# Patient Record
Sex: FEMALE | Race: WHITE | NOT HISPANIC OR LATINO | Employment: FULL TIME | URBAN - METROPOLITAN AREA
[De-identification: names, ages, dates, MRNs, and addresses within clinical notes are randomized per-mention and may not be internally consistent; named-entity substitution may affect disease eponyms.]

---

## 2017-03-08 ENCOUNTER — ALLSCRIPTS OFFICE VISIT (OUTPATIENT)
Dept: OTHER | Facility: OTHER | Age: 57
End: 2017-03-08

## 2017-07-18 ENCOUNTER — APPOINTMENT (OUTPATIENT)
Dept: LAB | Age: 57
End: 2017-07-18
Payer: COMMERCIAL

## 2017-07-18 ENCOUNTER — TRANSCRIBE ORDERS (OUTPATIENT)
Dept: ADMINISTRATIVE | Age: 57
End: 2017-07-18

## 2017-07-18 DIAGNOSIS — E03.9 UNSPECIFIED HYPOTHYROIDISM: ICD-10-CM

## 2017-07-18 DIAGNOSIS — Z13.220 SCREENING FOR LIPOID DISORDERS: ICD-10-CM

## 2017-07-18 DIAGNOSIS — E03.9 UNSPECIFIED HYPOTHYROIDISM: Primary | ICD-10-CM

## 2017-07-18 LAB
ALBUMIN SERPL BCP-MCNC: 3.6 G/DL (ref 3.5–5)
ALP SERPL-CCNC: 41 U/L (ref 46–116)
ALT SERPL W P-5'-P-CCNC: 19 U/L (ref 12–78)
ANION GAP SERPL CALCULATED.3IONS-SCNC: 4 MMOL/L (ref 4–13)
AST SERPL W P-5'-P-CCNC: 13 U/L (ref 5–45)
BASOPHILS # BLD AUTO: 0.03 THOUSANDS/ΜL (ref 0–0.1)
BASOPHILS NFR BLD AUTO: 1 % (ref 0–1)
BILIRUB SERPL-MCNC: 0.47 MG/DL (ref 0.2–1)
BUN SERPL-MCNC: 17 MG/DL (ref 5–25)
CALCIUM SERPL-MCNC: 8.6 MG/DL (ref 8.3–10.1)
CHLORIDE SERPL-SCNC: 106 MMOL/L (ref 100–108)
CHOLEST SERPL-MCNC: 205 MG/DL (ref 50–200)
CO2 SERPL-SCNC: 28 MMOL/L (ref 21–32)
CREAT SERPL-MCNC: 0.74 MG/DL (ref 0.6–1.3)
EOSINOPHIL # BLD AUTO: 0.07 THOUSAND/ΜL (ref 0–0.61)
EOSINOPHIL NFR BLD AUTO: 2 % (ref 0–6)
ERYTHROCYTE [DISTWIDTH] IN BLOOD BY AUTOMATED COUNT: 12.8 % (ref 11.6–15.1)
GFR SERPL CREATININE-BSD FRML MDRD: >60 ML/MIN/1.73SQ M
GLUCOSE P FAST SERPL-MCNC: 97 MG/DL (ref 65–99)
HCT VFR BLD AUTO: 35.3 % (ref 34.8–46.1)
HDLC SERPL-MCNC: 62 MG/DL (ref 40–60)
HGB BLD-MCNC: 11.9 G/DL (ref 11.5–15.4)
LDLC SERPL CALC-MCNC: 120 MG/DL (ref 0–100)
LYMPHOCYTES # BLD AUTO: 2.03 THOUSANDS/ΜL (ref 0.6–4.47)
LYMPHOCYTES NFR BLD AUTO: 43 % (ref 14–44)
MCH RBC QN AUTO: 31.3 PG (ref 26.8–34.3)
MCHC RBC AUTO-ENTMCNC: 33.7 G/DL (ref 31.4–37.4)
MCV RBC AUTO: 93 FL (ref 82–98)
MONOCYTES # BLD AUTO: 0.39 THOUSAND/ΜL (ref 0.17–1.22)
MONOCYTES NFR BLD AUTO: 8 % (ref 4–12)
NEUTROPHILS # BLD AUTO: 2.18 THOUSANDS/ΜL (ref 1.85–7.62)
NEUTS SEG NFR BLD AUTO: 46 % (ref 43–75)
NRBC BLD AUTO-RTO: 0 /100 WBCS
PLATELET # BLD AUTO: 218 THOUSANDS/UL (ref 149–390)
PMV BLD AUTO: 10.2 FL (ref 8.9–12.7)
POTASSIUM SERPL-SCNC: 4 MMOL/L (ref 3.5–5.3)
PROT SERPL-MCNC: 6.6 G/DL (ref 6.4–8.2)
RBC # BLD AUTO: 3.8 MILLION/UL (ref 3.81–5.12)
SODIUM SERPL-SCNC: 138 MMOL/L (ref 136–145)
TRIGL SERPL-MCNC: 116 MG/DL
TSH SERPL DL<=0.05 MIU/L-ACNC: 2.58 UIU/ML (ref 0.36–3.74)
WBC # BLD AUTO: 4.7 THOUSAND/UL (ref 4.31–10.16)

## 2017-07-18 PROCEDURE — 84443 ASSAY THYROID STIM HORMONE: CPT

## 2017-07-18 PROCEDURE — 80061 LIPID PANEL: CPT

## 2017-07-18 PROCEDURE — 85025 COMPLETE CBC W/AUTO DIFF WBC: CPT

## 2017-07-18 PROCEDURE — 36415 COLL VENOUS BLD VENIPUNCTURE: CPT

## 2017-07-18 PROCEDURE — 80053 COMPREHEN METABOLIC PANEL: CPT

## 2017-07-19 ENCOUNTER — GENERIC CONVERSION - ENCOUNTER (OUTPATIENT)
Dept: OTHER | Facility: OTHER | Age: 57
End: 2017-07-19

## 2017-08-07 ENCOUNTER — ALLSCRIPTS OFFICE VISIT (OUTPATIENT)
Dept: OTHER | Facility: OTHER | Age: 57
End: 2017-08-07

## 2017-10-05 ENCOUNTER — ALLSCRIPTS OFFICE VISIT (OUTPATIENT)
Dept: OTHER | Facility: OTHER | Age: 57
End: 2017-10-05

## 2017-10-06 NOTE — PROGRESS NOTES
Assessment  1  Acute upper respiratory infection (465 9) (J06 9)    Plan  Acute upper respiratory infection    · Azithromycin 250 MG Oral Tablet; TAKE 2 TABLETS ON DAY 1 THEN TAKE 1  TABLET A DAY FOR 4 DAYS  Insomnia    · Zolpidem Tartrate ER 6 25 MG Oral Tablet Extended Release; TAKE 1 TABLET AT  BEDTIME    Discussion/Summary    Inc oral fluidsto raise dose of Ambiencurrent meds  The patient was counseled regarding impressions  Possible side effects of new medications were reviewed with the patient/guardian today  The treatment plan was reviewed with the patient/guardian  The patient/guardian understands and agrees with the treatment plan      Chief Complaint  Patient presents to office today with a sinus headache for about a week now  History of Present Illness  HPI: 1 week of dry cough, sinus congestion, left worse than rightfever, chillsIbuprofen and Qnasl without much relief      Review of Systems    Constitutional: no fever  ENT: as noted in HPI,-no earache-and-no sore throat  Respiratory: cough, but-as noted in HPI  Gastrointestinal: no vomiting-and-no diarrhea  Other Symptoms: insomnia, takes Ambien ER as needed which is slightly better than the IR form  Active Problems  1  Allergic rhinitis (477 9) (J30 9)   2  Hypothyroidism (244 9) (E03 9)   3  Influenza vaccine refused (V64 06) (Z28 21)   4  Insomnia (780 52) (G47 00)   5  Need for diphtheria-tetanus-pertussis (Tdap) vaccine (V06 1) (Z23)   6  Premature ovarian failure (256 8) (E28 8)   7  Screening for lipid disorders (V77 91) (Z13 220)   8  Tubular adenoma of colon (211 3) (D12 6)    Past Medical History  Active Problems And Past Medical History Reviewed: The active problems and past medical history were reviewed and updated today  Surgical History  Surgical History Reviewed: The surgical history was reviewed and updated today         Social History   · Denied: History of Drug Use   · Exercises daily (V49 89) (Z78 9)   · Never a smoker   · Regular alcohol consumption (V69 8) (Z78 9)   · One beverage daily - July 2015  · Two children  The social history was reviewed and updated today  Family History  Family History Reviewed: The family history was reviewed and updated today  Current Meds   1  Estradiol 0 5 MG Oral Tablet; Taking 1 tablet 2 times weekly  August 2016; Therapy: (Recorded:05Wfi9734) to Recorded   2  Fiber Complete Oral Tablet; Takes 2 tablets daily; Therapy: (Recorded:24Pqf4894) to Recorded   3  Norethindrone Acetate 5 MG Oral Tablet; Taking 1 tablet 2 times weekly  August 2016; Therapy: 48BOM2169 to (Evaluate:08Itf1922) Recorded   4  Qnasl 80 MCG/ACT Nasal Aerosol Solution; Use 1 to 2 sprays to bilateral nostrils daily   during allergy season  Avoid nasal septum, as instructed; Therapy: 15XLG6681 to (Last Rx:78Ngu3123)  Requested for: 24Onq5912 Ordered   5  Synthroid 137 MCG Oral Tablet; TAKE 1 TABLET BY MOUTH EVERY DAY ON EMPTY   STOMACH; Therapy: 89CIB9233 to (Evaluate:67Rlk8356)  Requested for: 23LKX8205; Last   Rx:74Okc2446 Ordered   6  Zolpidem Tartrate ER 6 25 MG Oral Tablet Extended Release; TAKE 1 TABLET AT   BEDTIME; Therapy: 89Qzk8853 to (Evaluate:75Rtu9112); Last Rx:91Sxh8176 Ordered    The medication list was reviewed and updated today  Allergies  1  No Known Drug Allergies  2  Seasonal    Vitals   Recorded: 29ZHW5507 12:54PM   Temperature 98 2 F, Oral   Heart Rate 80   Systolic 851   Diastolic 62   Height 5 ft 6 in   Weight 152 lb 2 oz   BMI Calculated 24 55   BSA Calculated 1 78   O2 Saturation 95     Physical Exam    Constitutional   General appearance: No acute distress, well appearing and well nourished  Eyes   Conjunctiva and lids: No swelling, erythema or discharge  Ears, Nose, Mouth, and Throat   Otoscopic examination: Tympanic membranes translucent with normal light reflex  Canals patent without erythema      Oropharynx: Normal with no erythema, edema, exudate or lesions  Pulmonary   Respiratory effort: No increased work of breathing or signs of respiratory distress  Auscultation of lungs: Clear to auscultation  Cardiovascular   Auscultation of heart: Normal rate and rhythm, normal S1 and S2, without murmurs  Lymphatic   Palpation of lymph nodes in neck: No lymphadenopathy  Musculoskeletal   Gait and station: Normal          Future Appointments    Date/Time Provider Specialty Site   08/09/2018 08:00 AM ELAINE Arita   [facility]     Signatures   Electronically signed by : ELAINE Fischer ; Oct  5 2017  1:09PM EST                       (Author)

## 2018-01-12 VITALS
OXYGEN SATURATION: 95 % | HEIGHT: 66 IN | DIASTOLIC BLOOD PRESSURE: 62 MMHG | SYSTOLIC BLOOD PRESSURE: 106 MMHG | BODY MASS INDEX: 24.45 KG/M2 | HEART RATE: 80 BPM | TEMPERATURE: 98.2 F | WEIGHT: 152.13 LBS

## 2018-01-12 NOTE — PROGRESS NOTES
Assessment    1  Encounter for preventive health examination (V70 0) (Z00 00)   2  Hypothyroidism (244 9) (E03 9)   3  Pain in both feet (729 5) (M79 671,M79 672)   4  Pain of finger of left hand (729 5) (M79 645)   5  Allergic rhinitis (477 9) (J30 9)   6  Never a smoker   7  Regular alcohol consumption (V69 8) (Z78 9)   · One beverage daily - July 2015    8  Exercises daily (V49 89) (Z78 9)   9  Two children   10  Family history of colon cancer (V16 0) (Z80 0) : Father   6  Family history of Alzheimer's disease (V17 2) (Z82 0) : Mother   15  Family history of hyperlipidemia (V18 19) (Z83 49) : Mother   15  History of Arthroplasty With Prosthesis Trapezium   · Left thumb with LRTI - 2008 - Dr Elvie Robles  14  History of Tonsillectomy   15  Laboratory examination ordered as part of a complete physical examination (V72 62)    (Z00 00)   16  Screening for lipid disorders (V77 91) (Z13 220)   17  Need for diphtheria-tetanus-pertussis (Tdap) vaccine (V06 1) (Z23)   18  Influenza vaccine refused (V64 06) (Z28 21)    Plan   Allergic rhinitis    · Qnasl 80 MCG/ACT Nasal Aerosol Solution; Use 1 to 2 sprays to bilateral nostrils  daily during allergy season  Avoid nasal septum, as instructed  Health Maintenance    · Always use a seat belt and shoulder strap when riding or driving a motor vehicle ;  Status:Complete;   Done: 50FQY7946   · Brush your teeth freq1 and floss at least once a day ; Status:Complete;   Done:  71Aom4174   · Use a sun block product with an SPF of 15 or more ; Status:Complete;   Done:  05QHJ3317   · We recommend routine visits to a dentist ; Status:Complete;   Done: 40LKD5890   · We recommend that you follow these steps to lower your risk of osteoporosis  ;  Status:Complete;   Done: 82FLJ0212   · Call [phone] if: You find a new or different kind of lump in your breast ;  Status:Complete;   Done: 55UJH1200   · Call [phone] if:  You have any bleeding from the vagina ; Status:Complete; Done: 36SDN8159   · Call [phone] if: You have any warning signs of skin cancer ; Status:Complete;    Done: 82NOA7989   · Call [phone] if: You experience a new kind of chest pain (angina) or pressure ;  Status:Complete;   Done: 08XHK3526  Hypothyroidism, Laboratory examination ordered as part of a complete physical  examination, Screening for lipid disorders    · (1) CBC/PLT/DIFF; Status:Active; Requested YUT:38OEU9917;    · (1) COMPREHENSIVE METABOLIC PANEL; Status:Active; Requested DPV:43AWA1499;    · (1) LIPID PANEL FASTING W DIRECT LDL REFLEX; Status:Active; Requested  MSI:89XHZ1512;    · (1) TSH WITH FT4 REFLEX; Status:Active; Requested OXO:23KGA9539; Influenza vaccine refused    · Stop: Fluzone Quadrivalent 0 5 ML Intramuscular Suspension  Need for diphtheria-tetanus-pertussis (Tdap) vaccine    · Stop: Adacel 5-2-15 5 LF-MCG/0 5 Intramuscular Suspension  Pain of finger of left hand    · 1 Rahel Szymanski MD, Chrissie Moore  (Orthopedic Surgery) Physician Referral  Consult  Status: Active   Requested for: 14Vpt9591  Care Summary provided  : Yes    2 - Elfreda Heading  (Podiatry) Physician Referral  Consult  Status: Hold For - Scheduling  Requested for: 53Ned9349  Ordered; For: Pain in both feet;  Ordered By: Terese Daniels  Performed:   Due: 25JQB3594  Follow-up visit in 1 month Evaluation and Treatment  Follow-up  Status: Hold For - Scheduling  Requested for: 33Lqp0828  Ordered; For: Health Maintenance;  Ordered By: Terese Daniels  Performed:   Due: 28IBL9715     Discussion/Summary  health maintenance visit healthy adult female Currently, she eats a healthy diet and has an adequate exercise regimen  cervical cancer screening is current Breast cancer screening: mammogram is current  Colorectal cancer screening: colorectal cancer screening is current  The patient declines immunizations  Advice and education were given regarding sunscreen use and seat belt use  Patient discussion: discussed with the patient  Chief Complaint  HWN-The patient presents for a wellness visit  BK       History of Present Illness  HM, Adult Female: The patient is being seen for a health maintenance evaluation  The last health maintenance visit was 1 year(s) ago  Social History: Household members include spouse  She is   Work status: working full time and occupation: teacher  The patient has never smoked cigarettes  She reports drinking 1 drinks per day  The patient has no concerns about alcohol abuse  She has never used illicit drugs  General Health: The patient's health since the last visit is described as good  She has regular dental visits  The patient brushes 2 time(s) a day and flosses 1 time(s) a day  Dental problems: no tooth pain and no caries  She denies vision problems  Vision care includes wearing glasses  She denies hearing loss  Immunizations status: not up to date  Lifestyle:  She consumes a diverse and healthy diet  She does not have any weight concerns  She exercises regularly  She exercises for 60 minutes per session  Exercise includes walking and swimming  She consumes alcohol  She reports drinking 2 drinks per week  Alcohol concern: The patient has no concerns about alcohol abuse  She denies drug use  Reproductive health: the patient is postmenopausal   she is sexually active  Screening: Cervical cancer screening includes a pap smear performed 2015  Breast cancer screening includes a mammogram performed last year  Colorectal cancer screening includes a colonoscopy performed June 2016  Safety elements used: seat belt, bicycle helmet, safe driving habits, sunscreen, smoke detector and carbon monoxide detector  HPI: HWN-She is a pleasant 55-year-old lady who presents for a wellness visit  When asked, she reports feeling well  She had a colonoscopy performed in June 2016 and a five-year follow-up was recommended  She reports that she was bitten by her puppy on her left pinky finger   The wound healed, but there is a painful nodule that remains  Occasionally, she reports pain on the bottom of both feet in the middle of the ball of her feet  She reports that this symptom is very sporadic and rarely occurs  Review of Systems    Cardiovascular: No complaints of slow heart rate, no fast heart rate, no chest pain, no palpitations, no leg claudication, no lower extremity edema  Respiratory: No complaints of shortness of breath, no wheezing, no cough, no SOB on exertion, no orthopnea, no PND  Gastrointestinal: No complaints of abdominal pain, no constipation, no nausea or vomiting, no diarrhea, no bloody stools  Genitourinary: No complaints of dysuria, no incontinence, no pelvic pain, no dysmenorrhea, no vaginal discharge or bleeding  ROS reviewed  Active Problems    1  Allergic rhinitis (477 9) (J30 9)   2  Hypothyroidism (244 9) (E03 9)   3  Insomnia (780 52) (G47 00)   4  Laboratory examination ordered as part of a complete physical examination (V72 62)   (Z00 00)   5  Laboratory examination ordered as part of a routine general medical examination   (V72 62) (Z00 00)   6  Premature ovarian failure (256 8) (E28 8)   7  Screening for lipid disorders (V77 91) (Z13 220)   8   Tubular adenoma of colon (211 3) (D12 6)    Past Medical History    · History of Dyslipidemia (272 4) (E78 5)    Surgical History    · History of Arthroplasty With Prosthesis Trapezium   · History of Tonsillectomy    Family History  Mother    · Family history of Alzheimer's disease (V17 2) (Z82 0)   · Family history of hyperlipidemia (V18 19) (Z83 49)   · Family history of Mother  At Age 80  Father    · Family history of colon cancer (V16 0) (Z80 0)   · Family history of Father  At Age 64  Brother    · Family history of Brother's Year Of Birth   · Family history of Brother's Year Of Birth  Family History    · Family history of Family Health Status ___ Children Living    Social History    · Denied: History of Drug Use   · Exercises daily (V49 89) (Z78 9)   · Never a smoker   · Regular alcohol consumption (V69 8) (Z78 9)   · One beverage daily - July 2015  · Two children    Current Meds   1  Calcium + D TABS; Advised to get between 1200 and 2000 mg daily, combining diet and   supplements  UTD com/pts - Osteo P&T - July 2014; Therapy: (Recorded:75Oiy9811) to Recorded   2  Estradiol 0 5 MG Oral Tablet; Taking 1 tablet 2 times weekly  August 2016; Therapy: (Recorded:54Ctg1618) to Recorded   3  Fiber Complete Oral Tablet; Takes 2 tablets daily; Therapy: (Recorded:27Lqm3977) to Recorded   4  Norethindrone Acetate 5 MG Oral Tablet; Taking 1 tablet 2 times weekly  August 2016; Therapy: 88EST3675 to (Evaluate:74Bqf1608) Recorded   5  Qnasl 80 MCG/ACT Nasal Aerosol Solution; Use 1 to 2 sprays to bilateral nostrils daily   during allergy season  Avoid nasal septum, as instructed; Therapy: 10SHA6991 to (Last Rx:59Cwo7870)  Requested for: 01Apl6287 Ordered   6  Synthroid 137 MCG Oral Tablet; TAKE 1 TABLET BY MOUTH EVERY DAY ON EMPTY   STOMACH; Therapy: 23WCY9720 to (Evaluate:20Udu0845)  Requested for: 51Dyc5717; Last   Rx:80Tyl1031 Ordered   7  Zolpidem Tartrate 5 MG Oral Tablet; Take 1 tablet at bedtime as needed for insomnia  Must have 8 hours to sleep; Therapy: 71SST3474 to (Evaluate:60Kij3273)  Requested for: 03Apr2016; Last   Rx:09Pra8419; Status: ACTIVE - Renewal Denied Ordered    Allergies    1  No Known Drug Allergies    2  Seasonal    Vitals   Recorded: 91CFU0943 94:75XT   Systolic 259, RUE, Sitting   Diastolic 76, RUE, Sitting   Heart Rate 86   Respiration 16   Temperature 97 6 F, Oral   O2 Saturation 97   Height 5 ft 6 in   Weight 151 lb 0 4 oz   BMI Calculated 24 38   BSA Calculated 1 78     Physical Exam    Constitutional   General appearance: No acute distress, well appearing and well nourished    well developed, appears healthy, comfortable, well nourished, clothing appropriate, rested, not exhausted, well hydrated and appearance reflects stated age  Eyes   Conjunctiva and lids: No swelling, erythema or discharge  Ears, Nose, Mouth, and Throat   External inspection of ears and nose: Normal     Otoscopic examination: Abnormal   The right tympanic membrane was obscured  The left tympanic membrane was normal  The left external canal was normal  There is excessive cerumen but not obstructive in the right external ear canal  Exam of the left middle ear showed no middle ear effusion  Hearing: Normal     Neck   Neck: Supple, symmetric, trachea midline, no masses  The neck was normal and was supple  The neck was not swollen and was non-tender  the trachea was midline  Thyroid: Normal, no thyromegaly  The thyroid was normal  There were no thyroid nodules  Pulmonary   Respiratory effort: No increased work of breathing or signs of respiratory distress  Respiratory rate: normal  Assessment of respiratory effort revealed normal rhythm and effort  Auscultation of lungs: Clear to auscultation  no rales or crackles were heard bilaterally  no rhonchi  no friction rub  no wheezing  no diminished breath sounds  no bronchial breath sounds  Cardiovascular   Auscultation of heart: Normal rate and rhythm, normal S1 and S2, no murmurs  The heart rate was normal  The rhythm was regular  no murmurs were heard  Examination of extremities for edema and/or varicosities: Normal   no ankle edema and no pretibial edema  Abdomen   Abdomen: Non-tender, no masses  The abdomen was flat  The abdomen was soft and nontender  no masses palpated  Liver and spleen: No hepatomegaly or splenomegaly  No hepatosplenomegaly  Musculoskeletal   Gait and station: Normal     Digits and nails: Abnormal   There is a small raised nodule on her distal left pinky finger  Joints, bones, and muscles: Normal   Examination of the bottom of her feet is normal by visualization and palpation  The area of concern is the area of potential neuromas  Psychiatric   Judgment and insight: Normal     Mood and affect: Normal        Results/Data  PHQ-2 Adult Depression Screening 84Vyk0072 08:17AM User, Ahs     Test Name Result Flag Reference   PHQ-2 Adult Depression Score 0     Over the last two weeks, how often have you been bothered by any of the following problems? Little interest or pleasure in doing things: Not at all - 0  Feeling down, depressed, or hopeless: Not at all - 0   PHQ-2 Adult Depression Screening Negative         Provider Comments  #1  Her recent laboratory test results were reviewed and discussed with her during her office visit  #2  Health maintenance-a history and a physical exam were performed for her during her office visit  Her allergy history, medication history, social history, family history, and past surgical history were reviewed with her and updated, as needed  A TLC care guide from the computer program was given to her to take to review  I advised her to continue with annual wellness visits  #3  GYN/breast care-she is following with her gynecologist for preventative care  #4  Tubular adenomatous colon polyp/colon cancer screening-she is following with her GI specialist for preventative care and her next colonoscopy is due in June 2020  #5  Vaccine care-she gives informed refusal to receive the seasonal influenza vaccine and Adacel vaccine  I advised her to call for the Adacel vaccine, if she should suffer a laceration  PUI  #6  Hypothyroidism-she is chemically and clinically euthyroid  She will continue with her current medication and clinical/laboratory surveillance  #7  Allergic rhinitis-she requested a refill of her INS  She will continue with INS and clinical surveillance  #8  Painful nodule on her left pinky finger after animal bite-I advised her to see a hand specialist in consultation for further evaluation and management    #9  Complaint of pain on the bottom of both feet in the area of possible neuromas-I advised her to see a podiatrist in consultation for further evaluation and management  #10  Dermatologic care-she is following with her dermatologist a annual basis by report  #11  I advised her to return in one year or sooner, if needed  Future Appointments    Date/Time Provider Specialty Site   08/07/2017 08:00 AM ELAINE Zazueta  [facility]     Signatures   Electronically signed by :  Wilian Tobin MD; Aug  6 2016 10:33PM EST                       (Author)

## 2018-01-13 NOTE — RESULT NOTES
Message   #1  Please call the patient with the results of her thyroid function blood test       #2  Her thyroid function test is NORMAL  #3  I recommend that she continue with her current medication, until her next office visit  #4  You may leave a message, if her communication consent allows for it  Verified Results  (1) TSH WITH FT4 REFLEX [id number] 10:04AM Jessica Ulrich     Test Name Result Flag Reference   TSH 1 600 uIU/mL  0 358-3 740   Patients undergoing fluorescein dye angiography may retain small amounts of fluorescein in the body for 48-72 hours post procedure  Samples containing fluorescein can produce falsely depressed TSH values  If the patient had this procedure,a specimen should be resubmitted post fluorescein clearance            The recommended reference ranges for TSH during pregnancy are as follows:  First trimester 0 1 to 2 5 uIU/mL  Second trimester  0 2 to 3 0 uIU/mL  Third trimester 0 3 to 3 0 uIU/m       Plan  Hypothyroidism    · Synthroid 137 MCG Oral Tablet (Levothyroxine Sodium); TAKE 1 TABLET BY  MOUTH EVERY DAY ON EMPTY STOMACH

## 2018-01-15 NOTE — PROGRESS NOTES
Assessment    1  Encounter for preventive health examination (V70 0) (Z00 00)    Plan  Allergic rhinitis    · Qnasl 80 MCG/ACT Nasal Aerosol Solution; Use 1 to 2 sprays to bilateral nostrils  daily during allergy season  Avoid nasal septum, as instructed  Hypothyroidism, Insomnia, Screening for lipid disorders    · (1) CBC/PLT/DIFF; Status:Active; Requested for:01Aug2018;    · (1) COMPREHENSIVE METABOLIC PANEL; Status:Active; Requested for:01Aug2018;    · (1) LIPID PANEL FASTING W DIRECT LDL REFLEX; Status:Active; Requested  for:01Aug2018;    · (1) TSH WITH FT4 REFLEX; Status:Active; Requested OPF:[id number]; Insomnia    · Zolpidem Tartrate 5 MG Oral Tablet   · Zolpidem Tartrate ER 6 25 MG Oral Tablet Extended Release; TAKE 1 TABLET AT  BEDTIME    Discussion/Summary  health maintenance visit Currently, she eats an adequate diet and has an adequate exercise regimen  cervical cancer screening is current Breast cancer screening: mammogram is current  Colorectal cancer screening: colorectal cancer screening is current, colonoscopy is needed every five years and the next colonoscopy is due 2021  Osteoporosis screening: bone mineral density testing is not indicated  The immunizations will be given as outlined in the orders  Hepatitis C Screening:  The patient declines Hepatitis C screening  The patient was counseled regarding impressions  Possible side effects of new medications were reviewed with the patient/guardian today  The treatment plan was reviewed with the patient/guardian  The patient/guardian understands and agrees with the treatment plan      Chief Complaint  Wellness  History of Present Illness  HM, Adult Female: The patient is being seen for a health maintenance evaluation  The last health maintenance visit was 1 year(s) ago  Social History: Work status: working full time and occupation: teacher  The patient has never smoked cigarettes  She reports occasional alcohol use   She has never used illicit drugs  General Health: The patient's health since the last visit is described as good  She has regular dental visits  She complains of vision problems  Vision care includes wearing glasses and having regular eye examinations  She denies hearing loss  Hearing is normal  Immunizations status: not up to date The patient needs the following immunization(s): tetanus vaccine  Lifestyle:  She consumes a diverse and healthy diet  She is on a diet and is generally adherent  She does not have any weight concerns  She exercises regularly  She exercises 3 or more times per week for 30 or more minutes per session  Exercise includes walking and swimming  She does not use tobacco  The patient has never smoked cigarettes  She consumes alcohol  She reports occasional alcohol use  Alcohol concern: The patient has no concerns about alcohol abuse  She denies drug use  She has never used illicit drugs  Reproductive health:  she reports normal menses  she uses no contraception  she is sexually active  Screening: Cervical cancer screening includes uncertain timing of her last pap smear  Breast cancer screening includes a mammogram performed July 2017  Colorectal cancer screening includes a colonoscopy performed June 2016  Metabolic screening includes lipid profile performed July 2017, glucose screening performed July 2017 and thyroid function test performed July 2017  Cardiovascular risk factors: no hypertension, no diabetes, no high LDL cholesterol, no low HDL cholesterol, no stress, no obesity, no tobacco use, no illicit drug use, no sedentary lifestyle and no family history of cardiovascular disease  Review of Systems    Constitutional: feeling tired, but no fever, not feeling poorly, no recent weight gain, no chills and no recent weight loss  ENT: no sore throat and no nasal discharge  Cardiovascular: no chest pain and no palpitations  Respiratory: no shortness of breath and no cough  Gastrointestinal: no abdominal pain, no constipation and no diarrhea  Genitourinary: no dysuria and no incontinence  Psychiatric: sleep disturbances and insomnia for a few years, no relief from Ambien anymore which she takes a few times a week  Endocrine: hot flashes  Active Problems    1  Allergic rhinitis (477 9) (J30 9)   2  Hypothyroidism (244 9) (E03 9)   3  Influenza vaccine refused (V64 06) (Z28 21)   4  Insomnia (780 52) (G47 00)   5  Need for diphtheria-tetanus-pertussis (Tdap) vaccine (V06 1) (Z23)   6  Premature ovarian failure (256 8) (E28 8)   7  Screening for lipid disorders (V77 91) (Z13 220)   8  Tubular adenoma of colon (211 3) (D12 6)    Past Medical History    · History of Acute respiratory infection (519 8) (J22)   · History of Dyslipidemia (272 4) (E78 5)   · History of Laboratory examination ordered as part of a complete physical examination  (V72 62) (Z00 00)   · History of Laboratory examination ordered as part of a routine general medical  examination (V72 62) (Z00 00)   · History of Pain in both feet (729 5) (M79 671,M79 672)   · History of Pain of finger of left hand (729 5) (M79 645)    Surgical History    · History of Arthroplasty With Prosthesis Trapezium   · History of Tonsillectomy    Family History  Mother    · Family history of Alzheimer's disease (V17 2) (Z82 0)   · Family history of hyperlipidemia (V18 19) (Z83 49)   · Family history of Mother  At Age 80  Father    · Family history of colon cancer (V16 0) (Z80 0)   · Family history of Father  At Age 64  Brother    · Family history of Brother's Year Of Birth   · Family history of Brother's Year Of Birth  Family History    · Family history of Family Health Status ___ Children Living    Social History    · Denied: History of Drug Use   · Exercises daily (V49 89) (Z78 9)   · Never a smoker   · Regular alcohol consumption (V69 8) (Z78 9)   · One beverage daily - 2015     · Two children    Current Meds 1  Estradiol 0 5 MG Oral Tablet; Taking 1 tablet 2 times weekly  August 2016; Therapy: (Recorded:53Nxa0606) to Recorded   2  Fiber Complete Oral Tablet; Takes 2 tablets daily; Therapy: (Recorded:01Gtv7992) to Recorded   3  Norethindrone Acetate 5 MG Oral Tablet; Taking 1 tablet 2 times weekly  August 2016; Therapy: 76WRT3434 to (Evaluate:87Fyy6440) Recorded   4  Qnasl 80 MCG/ACT Nasal Aerosol Solution; Use 1 to 2 sprays to bilateral nostrils daily   during allergy season  Avoid nasal septum, as instructed; Therapy: 78JUA6136 to (Last Rx:35Kla7742)  Requested for: 16Fdj6974 Ordered   5  Synthroid 137 MCG Oral Tablet; TAKE 1 TABLET BY MOUTH EVERY DAY ON EMPTY   STOMACH; Therapy: 45LLD6628 to (Evaluate:12Zwu1534)  Requested for: 43QJY0314; Last   Rx:[id number] Ordered   6  Zolpidem Tartrate 5 MG Oral Tablet; TAKE 1 TABLET BY MOUTH AT BEDTIME AS   NEEDED FOR INSOMNIA  MUST HAVE 8HOURS TO SLEEP; Therapy: 27BKX6906 to (Evaluate:02Dlh5731)  Requested for: 01WJB4170; Last   Rx:[id number] Ordered    Allergies    1  No Known Drug Allergies    2  Seasonal    Vitals   Recorded: 07Aug2017 08:09AM   Heart Rate 76   Systolic 290   Diastolic 64   Height 5 ft 6 in   Weight 152 lb    BMI Calculated 24 53   BSA Calculated 1 78   O2 Saturation 98     Physical Exam    Constitutional   General appearance: No acute distress, well appearing and well nourished  Head and Face   Head and face: Normal     Eyes   Conjunctiva and lids: No swelling, erythema or discharge  Ears, Nose, Mouth, and Throat   Otoscopic examination: Tympanic membranes translucent with normal light reflex  Canals patent without erythema  Oropharynx: Normal with no erythema, edema, exudate or lesions  Neck   Neck: Supple, symmetric, trachea midline, no masses  Thyroid: Normal, no thyromegaly  Pulmonary   Respiratory effort: No increased work of breathing or signs of respiratory distress  Auscultation of lungs: Clear to auscultation  Cardiovascular   Auscultation of heart: Normal rate and rhythm, normal S1 and S2, no murmurs  Abdomen   Abdomen: Non-tender, no masses  Liver and spleen: No hepatomegaly or splenomegaly  Lymphatic   Palpation of lymph nodes in neck: No lymphadenopathy      Musculoskeletal   Gait and station: Normal     Psychiatric   Orientation to person, place, and time: Normal     Mood and affect: Normal        Signatures   Electronically signed by : ELAINE Prado ; Aug  7 2017  8:35AM EST                       (Author)

## 2018-01-15 NOTE — PROGRESS NOTES
Assessment    1  Acute respiratory infection (519 8) (J22)    Plan  Acute respiratory infection    · Azithromycin 250 MG Oral Tablet; TAKE 2 TABLETS ON DAY 1 THEN TAKE 1  TABLET A DAY FOR 4 DAYS   · Benzonatate 100 MG Oral Capsule; TAKE 1 CAPSULE 3 TIMES DAILY AS NEEDED    Discussion/Summary    TAKE RX AS DIRECTED  STREP IS NEG  CALL IF WORSENING  INCREASE FLUIDS AND REST  Chief Complaint  Patient here with a cold for the past week  History of Present Illness  HPI: 1 WEEK COLD SYMPTOMS,  RUNNY NOSE, COUGH THAT IS DRY, SORE THROAT,   NO FEVER BUT HAS CHILLS  OTC MEDICATION NOT EFFECTIVE       Review of Systems    Constitutional: no fever and no chills  Cardiovascular: no chest pain and no palpitations  Respiratory: cough, but no shortness of breath, no wheezing and no shortness of breath during exertion  Neurological: no headache, no numbness and no dizziness  ROS reviewed  Active Problems    1  Allergic rhinitis (477 9) (J30 9)   2  Hypothyroidism (244 9) (E03 9)   3  Influenza vaccine refused (V64 06) (Z28 21)   4  Insomnia (780 52) (G47 00)   5  Laboratory examination ordered as part of a complete physical examination (V72 62)   (Z00 00)   6  Laboratory examination ordered as part of a routine general medical examination   (V72 62) (Z00 00)   7  Need for diphtheria-tetanus-pertussis (Tdap) vaccine (V06 1) (Z23)   8  Pain in both feet (729 5) (M79 671,M79 672)   9  Pain of finger of left hand (729 5) (M79 645)   10  Premature ovarian failure (256 8) (E28 8)   11  Screening for lipid disorders (V77 91) (Z13 220)   12  Tubular adenoma of colon (211 3) (D12 6)    Past Medical History  Active Problems And Past Medical History Reviewed: The active problems and past medical history were reviewed and updated today  Surgical History  Surgical History Reviewed: The surgical history was reviewed and updated today         Social History    · Denied: History of Drug Use   · Exercises daily (V49 89) (Z78 9)   · Never a smoker   · Regular alcohol consumption (V69 8) (Z78 9)   · One beverage daily - July 2015  · Two children  The social history was reviewed and updated today  The social history was reviewed and is unchanged  Family History  Family History Reviewed: The family history was reviewed and updated today  Current Meds   1  Calcium + D TABS; Advised to get between 1200 and 2000 mg daily, combining diet and   supplements  UTD com/pts - Osteo P&T - July 2014; Therapy: (Recorded:49Ufh5175) to Recorded   2  Estradiol 0 5 MG Oral Tablet; Taking 1 tablet 2 times weekly  August 2016; Therapy: (Recorded:02Ddr4484) to Recorded   3  Fiber Complete Oral Tablet; Takes 2 tablets daily; Therapy: (Recorded:69Nao8175) to Recorded   4  Norethindrone Acetate 5 MG Oral Tablet; Taking 1 tablet 2 times weekly  August 2016; Therapy: 44ISL9592 to (Evaluate:04Tdf7063) Recorded   5  Qnasl 80 MCG/ACT Nasal Aerosol Solution; Use 1 to 2 sprays to bilateral nostrils daily   during allergy season  Avoid nasal septum, as instructed; Therapy: 50XCK4178 to (Last Rx:58Snb3587)  Requested for: 35Lxo7438 Ordered   6  Synthroid 137 MCG Oral Tablet; TAKE 1 TABLET BY MOUTH EVERY DAY ON EMPTY   STOMACH; Therapy: 01QQZ0794 to (Evaluate:80Rdh0214)  Requested for: 56Shz3789; Last   Rx:61Opt1100 Ordered   7  Zolpidem Tartrate 5 MG Oral Tablet; Take 1 tablet at bedtime as needed for insomnia  Must have 8 hours to sleep; Therapy: 82PBL5989 to [phone])  Requested for: 96Mux6444; Last   Rx:75Hxp2624; Status: ACTIVE - Renewal Denied Ordered    The medication list was reviewed and updated today  Allergies    1  No Known Drug Allergies    2   Seasonal    Vitals   Recorded: 16KYD2722 04:35PM   Temperature 97 7 F, Oral   Heart Rate 74   Systolic 982   Diastolic 74   Height 5 ft 6 in   Weight 150 lb 8 oz   BMI Calculated 24 29   BSA Calculated 1 77   O2 Saturation 96     Physical Exam    Constitutional General appearance: No acute distress, well appearing and well nourished  Eyes   Conjunctiva and lids: No swelling, erythema or discharge  Pupils and irises: Equal, round and reactive to light  Ears, Nose, Mouth, and Throat   External inspection of ears and nose: Normal     Otoscopic examination: Tympanic membranes translucent with normal light reflex  Canals patent without erythema  Nasal mucosa, septum, and turbinates: Normal without edema or erythema  Oropharynx: Normal with no erythema, edema, exudate or lesions  Pulmonary   Respiratory effort: No increased work of breathing or signs of respiratory distress  Auscultation of lungs: Clear to auscultation  Lymphatic   Palpation of lymph nodes in neck: No lymphadenopathy  Musculoskeletal   Gait and station: Normal     Psychiatric   Orientation to person, place, and time: Normal     Mood and affect: Normal          Future Appointments    Date/Time Provider Specialty Site   08/07/2017 08:00 AM ELAINE Cortez   [facility]     Signatures   Electronically signed by : Emily Hawley; Mar  9 2017  3:24PM EST                       (Author)    Electronically signed by : Clemente Shaikh DO; Mar 10 2017  1:21PM EST                       (Author)

## 2018-01-18 NOTE — RESULT NOTES
Verified Results  (1) CBC/PLT/DIFF [id number] 07:04AM [facility]     Test Name Result Flag Reference   WBC COUNT 4 70 Thousand/uL  4 31-10 16   RBC COUNT 3 80 Million/uL L 3 81-5 12   HEMOGLOBIN 11 9 g/dL  11 5-15 4   HEMATOCRIT 35 3 %  34 8-46  1   MCV 93 fL  82-98   MCH 31 3 pg  26 8-34 3   MCHC 33 7 g/dL  31 4-37 4   RDW 12 8 %  11 6-15 1   MPV 10 2 fL  8 9-12 7   PLATELET COUNT 166 Thousands/uL  149-390   nRBC AUTOMATED 0 /100 WBCs     NEUTROPHILS RELATIVE PERCENT 46 %  43-75   LYMPHOCYTES RELATIVE PERCENT 43 %  14-44   MONOCYTES RELATIVE PERCENT 8 %  4-12   EOSINOPHILS RELATIVE PERCENT 2 %  0-6   BASOPHILS RELATIVE PERCENT 1 %  0-1   NEUTROPHILS ABSOLUTE COUNT 2 18 Thousands/? ??L  1 85-7 62   LYMPHOCYTES ABSOLUTE COUNT 2 03 Thousands/? ??L  0 60-4 47   MONOCYTES ABSOLUTE COUNT 0 39 Thousand/? ??L  0 17-1 22   EOSINOPHILS ABSOLUTE COUNT 0 07 Thousand/? ??L  0 00-0 61   BASOPHILS ABSOLUTE COUNT 0 03 Thousands/? ??L  0 00-0 10   This bloodwork is non-fasting  Please drink two glasses of water morning of  bloodwork  (1) TSH WITH FT4 REFLEX [id number] 07:04AM [facility]     Test Name Result Flag Reference   TSH 2 580 uIU/mL  0 358-3 740   Patients undergoing fluorescein dye angiography may retain small amounts of fluorescein in the body for 48-72 hours post procedure  Samples containing fluorescein can produce falsely depressed TSH values  If the patient had this procedure,a specimen should be resubmitted post fluorescein clearance            The recommended reference ranges for TSH during pregnancy are as follows:  First trimester 0 1 to 2 5 uIU/mL  Second trimester  0 2 to 3 0 uIU/mL  Third trimester 0 3 to 3 0 uIU/m     (1) COMPREHENSIVE METABOLIC PANEL [id number] 21:02FI [facility]     Test Name Result Flag Reference   SODIUM 138 mmol/L  136-145   POTASSIUM 4 0 mmol/L  3 5-5 3   CHLORIDE 106 mmol/L  100-108   CARBON DIOXIDE 28 mmol/L  21-32   ANION GAP (CALC) 4 mmol/L  4-13   BLOOD UREA NITROGEN 17 mg/dL  5-25 CREATININE 0 74 mg/dL  0 60-1 30   Standardized to IDMS reference method   CALCIUM 8 6 mg/dL  8 3-10 1   BILI, TOTAL 0 47 mg/dL  0 20-1 00   ALK PHOSPHATAS 41 U/L L    ALT (SGPT) 19 U/L  12-78   AST(SGOT) 13 U/L  5-45   ALBUMIN 3 6 g/dL  3 5-5 0   TOTAL PROTEIN 6 6 g/dL  6 4-8 2   eGFR Non-African American      >60 0 ml/min/1 73sq m   [facility] Disease Education Program recommendations are as follows:  GFR calculation is accurate only with a steady state creatinine  Chronic Kidney disease less than 60 ml/min/1 73 sq  meters  Kidney failure less than 15 ml/min/1 73 sq  meters  GLUCOSE FASTING 97 mg/dL  65-99     (1) LIPID PANEL FASTING W DIRECT LDL REFLEX [id number] 07:04AM [facility]     Test Name Result Flag Reference   CHOLESTEROL 205 mg/dL H    LDL CHOLESTEROL CALCULATED 120 mg/dL H 0-100   This is a fasting blood test  Water,black tea or black  coffee only after 9:00pm the night before test  Drink 2 glasses of water the morning of test         Triglyceride:         Normal              <150 mg/dl       Borderline High    150-199 mg/dl       High               200-499 mg/dl       Very High          >499 mg/dl  Cholesterol:         Desirable        <200 mg/dl      Borderline High  200-239 mg/dl      High             >239 mg/dl  HDL Cholesterol:        High    >59 mg/dL      Low     <41 mg/dL  LDL Cholesterol:        Optimal          <100 mg/dl        Near Optimal     100-129 mg/dl        Above Optimal          Borderline High   130-159 mg/dl          High              160-189 mg/dl          Very High        >189 mg/dl  LDL CALCULATED:    This screening LDL is a calculated result  It does not have the accuracy of the Direct Measured LDL in the monitoring of patients with hyperlipidemia and/or statin therapy  Direct Measure LDL (TKC548) must be ordered separately in these patients     TRIGLYCERIDES 116 mg/dL  <=150   Specimen collection should occur prior to N-Acetylcysteine or Metamizole administration due to the potential for falsely depressed results  HDL,DIRECT 62 mg/dL H 40-60   Specimen collection should occur prior to Metamizole administration due to the potential for falsely depressed results  Discussion/Summary   Your blood tests look fine        Signatures   Electronically signed by : ELAINE Gardner ; Jul 19 2017  5:04PM EST                       (Author)

## 2018-01-22 VITALS
HEIGHT: 66 IN | HEART RATE: 74 BPM | TEMPERATURE: 97.7 F | SYSTOLIC BLOOD PRESSURE: 116 MMHG | DIASTOLIC BLOOD PRESSURE: 74 MMHG | OXYGEN SATURATION: 96 % | WEIGHT: 150.5 LBS | BODY MASS INDEX: 24.19 KG/M2

## 2018-01-22 VITALS
HEIGHT: 66 IN | OXYGEN SATURATION: 98 % | HEART RATE: 76 BPM | SYSTOLIC BLOOD PRESSURE: 104 MMHG | WEIGHT: 152 LBS | BODY MASS INDEX: 24.43 KG/M2 | DIASTOLIC BLOOD PRESSURE: 64 MMHG

## 2018-06-06 DIAGNOSIS — J30.9 ALLERGIC RHINITIS, UNSPECIFIED SEASONALITY, UNSPECIFIED TRIGGER: Primary | ICD-10-CM

## 2018-06-06 DIAGNOSIS — G47.00 INSOMNIA, UNSPECIFIED TYPE: ICD-10-CM

## 2018-06-06 RX ORDER — ZOLPIDEM TARTRATE 6.25 MG/1
1 TABLET, FILM COATED, EXTENDED RELEASE ORAL
COMMUNITY
Start: 2017-08-07 | End: 2018-06-06 | Stop reason: SDUPTHER

## 2018-06-07 RX ORDER — ZOLPIDEM TARTRATE 6.25 MG/1
6.25 TABLET, FILM COATED, EXTENDED RELEASE ORAL
Qty: 30 TABLET | Refills: 2 | Status: SHIPPED | OUTPATIENT
Start: 2018-06-07 | End: 2018-08-09 | Stop reason: SDUPTHER

## 2018-06-13 ENCOUNTER — TELEPHONE (OUTPATIENT)
Dept: INTERNAL MEDICINE CLINIC | Facility: CLINIC | Age: 58
End: 2018-06-13

## 2018-08-01 DIAGNOSIS — Z13.220 ENCOUNTER FOR SCREENING FOR LIPOID DISORDERS: ICD-10-CM

## 2018-08-01 DIAGNOSIS — G47.00 INSOMNIA: ICD-10-CM

## 2018-08-01 DIAGNOSIS — E03.9 HYPOTHYROIDISM: ICD-10-CM

## 2018-08-02 ENCOUNTER — APPOINTMENT (OUTPATIENT)
Dept: LAB | Age: 58
End: 2018-08-02
Payer: COMMERCIAL

## 2018-08-02 DIAGNOSIS — G47.00 INSOMNIA: ICD-10-CM

## 2018-08-02 DIAGNOSIS — E03.9 HYPOTHYROIDISM: ICD-10-CM

## 2018-08-02 DIAGNOSIS — Z13.220 ENCOUNTER FOR SCREENING FOR LIPOID DISORDERS: ICD-10-CM

## 2018-08-02 LAB
ALBUMIN SERPL BCP-MCNC: 4.1 G/DL (ref 3.5–5)
ALP SERPL-CCNC: 44 U/L (ref 46–116)
ALT SERPL W P-5'-P-CCNC: 23 U/L (ref 12–78)
ANION GAP SERPL CALCULATED.3IONS-SCNC: 11 MMOL/L (ref 4–13)
AST SERPL W P-5'-P-CCNC: 17 U/L (ref 5–45)
BASOPHILS # BLD AUTO: 0.03 THOUSANDS/ΜL (ref 0–0.1)
BASOPHILS NFR BLD AUTO: 1 % (ref 0–1)
BILIRUB SERPL-MCNC: 0.74 MG/DL (ref 0.2–1)
BUN SERPL-MCNC: 15 MG/DL (ref 5–25)
CALCIUM SERPL-MCNC: 8.9 MG/DL (ref 8.3–10.1)
CHLORIDE SERPL-SCNC: 103 MMOL/L (ref 100–108)
CHOLEST SERPL-MCNC: 235 MG/DL (ref 50–200)
CO2 SERPL-SCNC: 24 MMOL/L (ref 21–32)
CREAT SERPL-MCNC: 0.7 MG/DL (ref 0.6–1.3)
EOSINOPHIL # BLD AUTO: 0.09 THOUSAND/ΜL (ref 0–0.61)
EOSINOPHIL NFR BLD AUTO: 2 % (ref 0–6)
ERYTHROCYTE [DISTWIDTH] IN BLOOD BY AUTOMATED COUNT: 12.3 % (ref 11.6–15.1)
GFR SERPL CREATININE-BSD FRML MDRD: 96 ML/MIN/1.73SQ M
GLUCOSE P FAST SERPL-MCNC: 94 MG/DL (ref 65–99)
HCT VFR BLD AUTO: 39.5 % (ref 34.8–46.1)
HDLC SERPL-MCNC: 60 MG/DL (ref 40–60)
HGB BLD-MCNC: 12.6 G/DL (ref 11.5–15.4)
IMM GRANULOCYTES # BLD AUTO: 0.01 THOUSAND/UL (ref 0–0.2)
IMM GRANULOCYTES NFR BLD AUTO: 0 % (ref 0–2)
LDLC SERPL CALC-MCNC: 148 MG/DL (ref 0–100)
LYMPHOCYTES # BLD AUTO: 1.93 THOUSANDS/ΜL (ref 0.6–4.47)
LYMPHOCYTES NFR BLD AUTO: 43 % (ref 14–44)
MCH RBC QN AUTO: 30.6 PG (ref 26.8–34.3)
MCHC RBC AUTO-ENTMCNC: 31.9 G/DL (ref 31.4–37.4)
MCV RBC AUTO: 96 FL (ref 82–98)
MONOCYTES # BLD AUTO: 0.43 THOUSAND/ΜL (ref 0.17–1.22)
MONOCYTES NFR BLD AUTO: 10 % (ref 4–12)
NEUTROPHILS # BLD AUTO: 1.98 THOUSANDS/ΜL (ref 1.85–7.62)
NEUTS SEG NFR BLD AUTO: 44 % (ref 43–75)
NRBC BLD AUTO-RTO: 0 /100 WBCS
PLATELET # BLD AUTO: 228 THOUSANDS/UL (ref 149–390)
PMV BLD AUTO: 10.1 FL (ref 8.9–12.7)
POTASSIUM SERPL-SCNC: 4 MMOL/L (ref 3.5–5.3)
PROT SERPL-MCNC: 7.2 G/DL (ref 6.4–8.2)
RBC # BLD AUTO: 4.12 MILLION/UL (ref 3.81–5.12)
SODIUM SERPL-SCNC: 138 MMOL/L (ref 136–145)
TRIGL SERPL-MCNC: 137 MG/DL
TSH SERPL DL<=0.05 MIU/L-ACNC: 1.56 UIU/ML (ref 0.36–3.74)
WBC # BLD AUTO: 4.47 THOUSAND/UL (ref 4.31–10.16)

## 2018-08-02 PROCEDURE — 80061 LIPID PANEL: CPT

## 2018-08-02 PROCEDURE — 80053 COMPREHEN METABOLIC PANEL: CPT

## 2018-08-02 PROCEDURE — 84443 ASSAY THYROID STIM HORMONE: CPT

## 2018-08-02 PROCEDURE — 85025 COMPLETE CBC W/AUTO DIFF WBC: CPT

## 2018-08-02 PROCEDURE — 36415 COLL VENOUS BLD VENIPUNCTURE: CPT

## 2018-08-09 ENCOUNTER — OFFICE VISIT (OUTPATIENT)
Dept: INTERNAL MEDICINE CLINIC | Facility: CLINIC | Age: 58
End: 2018-08-09
Payer: COMMERCIAL

## 2018-08-09 VITALS
RESPIRATION RATE: 16 BRPM | DIASTOLIC BLOOD PRESSURE: 78 MMHG | WEIGHT: 151.2 LBS | OXYGEN SATURATION: 97 % | BODY MASS INDEX: 24.3 KG/M2 | SYSTOLIC BLOOD PRESSURE: 118 MMHG | HEART RATE: 72 BPM | HEIGHT: 66 IN

## 2018-08-09 DIAGNOSIS — G47.00 INSOMNIA, UNSPECIFIED TYPE: ICD-10-CM

## 2018-08-09 DIAGNOSIS — E78.5 HYPERLIPIDEMIA, UNSPECIFIED HYPERLIPIDEMIA TYPE: ICD-10-CM

## 2018-08-09 DIAGNOSIS — N95.1 SYMPTOMATIC MENOPAUSAL OR FEMALE CLIMACTERIC STATES: ICD-10-CM

## 2018-08-09 DIAGNOSIS — J30.9 ALLERGIC RHINITIS, UNSPECIFIED SEASONALITY, UNSPECIFIED TRIGGER: ICD-10-CM

## 2018-08-09 DIAGNOSIS — Z00.00 WELLNESS EXAMINATION: Primary | ICD-10-CM

## 2018-08-09 DIAGNOSIS — E89.0 POSTOPERATIVE HYPOTHYROIDISM: ICD-10-CM

## 2018-08-09 PROCEDURE — 99396 PREV VISIT EST AGE 40-64: CPT | Performed by: INTERNAL MEDICINE

## 2018-08-09 RX ORDER — LEVOTHYROXINE SODIUM 137 UG/1
TABLET ORAL
COMMUNITY
Start: 2013-12-04 | End: 2018-09-22 | Stop reason: SDUPTHER

## 2018-08-09 RX ORDER — ESTRADIOL 0.5 MG/1
TABLET ORAL
COMMUNITY
End: 2021-12-22

## 2018-08-09 RX ORDER — ZOLPIDEM TARTRATE 6.25 MG/1
6.25 TABLET, FILM COATED, EXTENDED RELEASE ORAL
Qty: 30 TABLET | Refills: 0 | Status: SHIPPED | OUTPATIENT
Start: 2018-08-09 | End: 2019-02-22 | Stop reason: SDUPTHER

## 2018-08-09 NOTE — PROGRESS NOTES
Assessment/Plan:  Consider shingles vaccine  Call if she would like to switch to Ambien 10mg from the CR     Problem List Items Addressed This Visit     Hypothyroidism    Relevant Orders    CBC and differential    TSH, 3rd generation with Free T4 reflex    Insomnia    Relevant Medications    zolpidem (AMBIEN CR) 6 25 MG CR tablet    Symptomatic menopausal or female climacteric states      Other Visit Diagnoses     Wellness examination    -  Primary    Allergic rhinitis, unspecified seasonality, unspecified trigger        Relevant Medications    Beclomethasone Dipropionate (QNASL) 80 MCG/ACT AERS    Hyperlipidemia, unspecified hyperlipidemia type        Relevant Orders    Lipid panel    Comprehensive metabolic panel    Lipid panel            Subjective:      Patient ID: Avtar Bazzi is a 63 y o  female  HPI  HM, Adult Female: The patient is being seen for a health maintenance evaluation  The last health maintenance visit was 1 year(s) ago  Social History: Work status: working full time and occupation: teacher  The patient has never smoked cigarettes  She reports occasional alcohol use  She has never used illicit drugs  General Health: The patient's health since the last visit is described as good  She has regular dental visits  She complains of vision problems  Vision care includes wearing glasses and having regular eye examinations  She denies hearing loss  Hearing is normal  Immunizations status: not up to date The patient needs the following immunization(s): shingles   Lifestyle:  She consumes a diverse and healthy diet  She is on a diet and is generally adherent  She does not have any weight concerns  She exercises regularly  She exercises 3 or more times per week for 30 or more minutes per session  Exercise includes walking and swimming  She does not use tobacco  The patient has never smoked cigarettes  She consumes alcohol  She reports occasional alcohol use  Alcohol concern:  The patient has no concerns about alcohol abuse  She denies drug use  She has never used illicit drugs  Screening: Cervical cancer screening includes  pap smear 2016  Breast cancer screening includes a mammogram performed July 2018  Colorectal cancer screening includes a colonoscopy performed June 2016  Metabolic screening includes lipid profile performed July 2018, glucose screening performed July 2018 and thyroid function test performed July 2018       Cardiovascular risk factors: no hypertension, no diabetes, no high LDL cholesterol, no low HDL cholesterol, no stress, no obesity, no tobacco use, no illicit drug use, no sedentary lifestyle and no family history of cardiovascular disease  The following portions of the patient's history were reviewed and updated as appropriate: allergies, current medications, past family history, past social history, past surgical history and problem list     Review of Systems   Constitutional: Negative for fatigue, fever and unexpected weight change  HENT: Negative for ear pain, hearing loss, sinus pain, sinus pressure and sore throat  Respiratory: Negative for cough, shortness of breath and wheezing  Cardiovascular: Negative for chest pain, palpitations and leg swelling  Gastrointestinal: Negative for abdominal pain, constipation, diarrhea, nausea and vomiting  Endocrine:        Hot flashes-stress related   Musculoskeletal: Positive for arthralgias (knees with relief from cortisone inj recently)  Negative for myalgias  Neurological: Negative for dizziness and headaches  Psychiatric/Behavioral: Positive for sleep disturbance (Not much relief from Ambien CR; she was on the IR before at 5mg but at some point, it was not effective either)           Objective:      /78 (BP Location: Left arm, Patient Position: Sitting, Cuff Size: Standard)   Pulse 72   Resp 16   Ht 5' 6" (1 676 m)   Wt 68 6 kg (151 lb 3 2 oz)   SpO2 97%   BMI 24 40 kg/m²          Physical Exam Constitutional: She is oriented to person, place, and time  She appears well-developed and well-nourished  HENT:   Head: Normocephalic and atraumatic  Right Ear: External ear normal    Left Ear: External ear normal    Mouth/Throat: Oropharynx is clear and moist    Eyes: Conjunctivae are normal    Neck: Neck supple  Cardiovascular: Normal rate, regular rhythm and normal heart sounds  No murmur heard  Pulmonary/Chest: Effort normal and breath sounds normal  No respiratory distress  She has no wheezes  She has no rales  Abdominal: Soft  Bowel sounds are normal  She exhibits no distension and no mass  There is no tenderness  There is no rebound and no guarding  Musculoskeletal: Normal range of motion  Neurological: She is alert and oriented to person, place, and time  Skin: Skin is warm and dry  Psychiatric: She has a normal mood and affect   Her behavior is normal  Judgment and thought content normal

## 2018-09-22 DIAGNOSIS — E03.9 HYPOTHYROIDISM, UNSPECIFIED TYPE: Primary | ICD-10-CM

## 2018-09-23 RX ORDER — LEVOTHYROXINE SODIUM 137 MCG
TABLET ORAL
Qty: 30 TABLET | Refills: 11 | Status: SHIPPED | OUTPATIENT
Start: 2018-09-23 | End: 2019-09-23 | Stop reason: SDUPTHER

## 2018-12-10 ENCOUNTER — APPOINTMENT (OUTPATIENT)
Dept: LAB | Age: 58
End: 2018-12-10
Payer: COMMERCIAL

## 2018-12-10 DIAGNOSIS — E78.5 HYPERLIPIDEMIA, UNSPECIFIED HYPERLIPIDEMIA TYPE: ICD-10-CM

## 2018-12-10 DIAGNOSIS — E89.0 POSTOPERATIVE HYPOTHYROIDISM: ICD-10-CM

## 2018-12-10 LAB
CHOLEST SERPL-MCNC: 181 MG/DL (ref 50–200)
HDLC SERPL-MCNC: 66 MG/DL (ref 40–60)
LDLC SERPL CALC-MCNC: 98 MG/DL (ref 0–100)
NONHDLC SERPL-MCNC: 115 MG/DL
TRIGL SERPL-MCNC: 86 MG/DL
TSH SERPL DL<=0.05 MIU/L-ACNC: 0.53 UIU/ML (ref 0.36–3.74)

## 2018-12-10 PROCEDURE — 84443 ASSAY THYROID STIM HORMONE: CPT

## 2018-12-10 PROCEDURE — 80061 LIPID PANEL: CPT

## 2018-12-10 PROCEDURE — 36415 COLL VENOUS BLD VENIPUNCTURE: CPT

## 2019-02-22 DIAGNOSIS — G47.00 INSOMNIA, UNSPECIFIED TYPE: ICD-10-CM

## 2019-02-22 RX ORDER — ZOLPIDEM TARTRATE 6.25 MG/1
6.25 TABLET, FILM COATED, EXTENDED RELEASE ORAL
Qty: 30 TABLET | Refills: 0 | Status: SHIPPED | OUTPATIENT
Start: 2019-02-22 | End: 2019-06-10 | Stop reason: SDUPTHER

## 2019-06-10 DIAGNOSIS — G47.00 INSOMNIA, UNSPECIFIED TYPE: ICD-10-CM

## 2019-06-10 DIAGNOSIS — J30.9 ALLERGIC RHINITIS, UNSPECIFIED SEASONALITY, UNSPECIFIED TRIGGER: ICD-10-CM

## 2019-06-10 RX ORDER — ZOLPIDEM TARTRATE 6.25 MG/1
6.25 TABLET, FILM COATED, EXTENDED RELEASE ORAL
Qty: 30 TABLET | Refills: 0 | Status: SHIPPED | OUTPATIENT
Start: 2019-06-10 | End: 2019-08-12 | Stop reason: HOSPADM

## 2019-08-05 ENCOUNTER — APPOINTMENT (OUTPATIENT)
Dept: LAB | Age: 59
End: 2019-08-05
Payer: COMMERCIAL

## 2019-08-05 ENCOUNTER — TRANSCRIBE ORDERS (OUTPATIENT)
Dept: ADMINISTRATIVE | Age: 59
End: 2019-08-05

## 2019-08-05 DIAGNOSIS — E89.0 POSTSURGICAL HYPOTHYROIDISM: ICD-10-CM

## 2019-08-05 DIAGNOSIS — E78.5 HYPERLIPIDEMIA, UNSPECIFIED HYPERLIPIDEMIA TYPE: ICD-10-CM

## 2019-08-05 DIAGNOSIS — E89.0 POSTSURGICAL HYPOTHYROIDISM: Primary | ICD-10-CM

## 2019-08-05 DIAGNOSIS — E89.0 POSTOPERATIVE HYPOTHYROIDISM: ICD-10-CM

## 2019-08-05 LAB
ALBUMIN SERPL BCP-MCNC: 3.9 G/DL (ref 3.5–5)
ALP SERPL-CCNC: 46 U/L (ref 46–116)
ALT SERPL W P-5'-P-CCNC: 20 U/L (ref 12–78)
ANION GAP SERPL CALCULATED.3IONS-SCNC: 7 MMOL/L (ref 4–13)
AST SERPL W P-5'-P-CCNC: 18 U/L (ref 5–45)
BASOPHILS # BLD AUTO: 0.04 THOUSANDS/ΜL (ref 0–0.1)
BASOPHILS NFR BLD AUTO: 1 % (ref 0–1)
BILIRUB SERPL-MCNC: 0.62 MG/DL (ref 0.2–1)
BUN SERPL-MCNC: 18 MG/DL (ref 5–25)
CALCIUM SERPL-MCNC: 8.5 MG/DL (ref 8.3–10.1)
CHLORIDE SERPL-SCNC: 108 MMOL/L (ref 100–108)
CHOLEST SERPL-MCNC: 201 MG/DL (ref 50–200)
CO2 SERPL-SCNC: 25 MMOL/L (ref 21–32)
CREAT SERPL-MCNC: 0.66 MG/DL (ref 0.6–1.3)
EOSINOPHIL # BLD AUTO: 0.14 THOUSAND/ΜL (ref 0–0.61)
EOSINOPHIL NFR BLD AUTO: 3 % (ref 0–6)
ERYTHROCYTE [DISTWIDTH] IN BLOOD BY AUTOMATED COUNT: 12.5 % (ref 11.6–15.1)
GFR SERPL CREATININE-BSD FRML MDRD: 98 ML/MIN/1.73SQ M
GLUCOSE P FAST SERPL-MCNC: 90 MG/DL (ref 65–99)
HCT VFR BLD AUTO: 36.8 % (ref 34.8–46.1)
HDLC SERPL-MCNC: 64 MG/DL (ref 40–60)
HGB BLD-MCNC: 12 G/DL (ref 11.5–15.4)
IMM GRANULOCYTES # BLD AUTO: 0.01 THOUSAND/UL (ref 0–0.2)
IMM GRANULOCYTES NFR BLD AUTO: 0 % (ref 0–2)
LDLC SERPL CALC-MCNC: 111 MG/DL (ref 0–100)
LYMPHOCYTES # BLD AUTO: 2.33 THOUSANDS/ΜL (ref 0.6–4.47)
LYMPHOCYTES NFR BLD AUTO: 55 % (ref 14–44)
MCH RBC QN AUTO: 31.5 PG (ref 26.8–34.3)
MCHC RBC AUTO-ENTMCNC: 32.6 G/DL (ref 31.4–37.4)
MCV RBC AUTO: 97 FL (ref 82–98)
MONOCYTES # BLD AUTO: 0.38 THOUSAND/ΜL (ref 0.17–1.22)
MONOCYTES NFR BLD AUTO: 9 % (ref 4–12)
NEUTROPHILS # BLD AUTO: 1.39 THOUSANDS/ΜL (ref 1.85–7.62)
NEUTS SEG NFR BLD AUTO: 32 % (ref 43–75)
NONHDLC SERPL-MCNC: 137 MG/DL
NRBC BLD AUTO-RTO: 0 /100 WBCS
PLATELET # BLD AUTO: 212 THOUSANDS/UL (ref 149–390)
PMV BLD AUTO: 10.5 FL (ref 8.9–12.7)
POTASSIUM SERPL-SCNC: 4.1 MMOL/L (ref 3.5–5.3)
PROT SERPL-MCNC: 6.8 G/DL (ref 6.4–8.2)
RBC # BLD AUTO: 3.81 MILLION/UL (ref 3.81–5.12)
SODIUM SERPL-SCNC: 140 MMOL/L (ref 136–145)
TRIGL SERPL-MCNC: 130 MG/DL
TSH SERPL DL<=0.05 MIU/L-ACNC: 1.08 UIU/ML (ref 0.36–3.74)
WBC # BLD AUTO: 4.29 THOUSAND/UL (ref 4.31–10.16)

## 2019-08-05 PROCEDURE — 36415 COLL VENOUS BLD VENIPUNCTURE: CPT

## 2019-08-05 PROCEDURE — 80061 LIPID PANEL: CPT

## 2019-08-05 PROCEDURE — 80053 COMPREHEN METABOLIC PANEL: CPT

## 2019-08-05 PROCEDURE — 85025 COMPLETE CBC W/AUTO DIFF WBC: CPT

## 2019-08-05 PROCEDURE — 84443 ASSAY THYROID STIM HORMONE: CPT

## 2019-08-09 DIAGNOSIS — Z12.31 ENCOUNTER FOR SCREENING MAMMOGRAM FOR MALIGNANT NEOPLASM OF BREAST: Primary | ICD-10-CM

## 2019-08-12 ENCOUNTER — OFFICE VISIT (OUTPATIENT)
Dept: INTERNAL MEDICINE CLINIC | Facility: CLINIC | Age: 59
End: 2019-08-12
Payer: COMMERCIAL

## 2019-08-12 VITALS
RESPIRATION RATE: 14 BRPM | DIASTOLIC BLOOD PRESSURE: 72 MMHG | SYSTOLIC BLOOD PRESSURE: 106 MMHG | BODY MASS INDEX: 23.82 KG/M2 | OXYGEN SATURATION: 98 % | TEMPERATURE: 98.2 F | WEIGHT: 148.2 LBS | HEART RATE: 81 BPM | HEIGHT: 66 IN

## 2019-08-12 DIAGNOSIS — Z00.00 WELLNESS EXAMINATION: Primary | ICD-10-CM

## 2019-08-12 DIAGNOSIS — E89.0 POSTOPERATIVE HYPOTHYROIDISM: ICD-10-CM

## 2019-08-12 DIAGNOSIS — J30.9 ALLERGIC RHINITIS, UNSPECIFIED SEASONALITY, UNSPECIFIED TRIGGER: ICD-10-CM

## 2019-08-12 DIAGNOSIS — F51.01 PRIMARY INSOMNIA: ICD-10-CM

## 2019-08-12 DIAGNOSIS — Z12.31 BREAST CANCER SCREENING BY MAMMOGRAM: ICD-10-CM

## 2019-08-12 DIAGNOSIS — M41.34 THORACOGENIC SCOLIOSIS OF THORACIC REGION: ICD-10-CM

## 2019-08-12 PROCEDURE — 99396 PREV VISIT EST AGE 40-64: CPT | Performed by: INTERNAL MEDICINE

## 2019-08-12 RX ORDER — ALPRAZOLAM 0.25 MG/1
0.25 TABLET ORAL
Qty: 30 TABLET | Refills: 0 | Status: SHIPPED | OUTPATIENT
Start: 2019-08-12 | End: 2019-10-21 | Stop reason: SDUPTHER

## 2019-08-12 NOTE — PROGRESS NOTES
Assessment/Plan:     Problem List Items Addressed This Visit        Endocrine    Hypothyroidism    Relevant Orders    CBC and differential    Comprehensive metabolic panel    Lipid panel    TSH, 3rd generation with Free T4 reflex       Other    Insomnia    Relevant Medications    ALPRAZolam (XANAX) 0 25 mg tablet      Other Visit Diagnoses     Wellness examination    -  Primary    Breast cancer screening by mammogram        Relevant Orders    Mammo screening bilateral w 3d & cad    Allergic rhinitis, unspecified seasonality, unspecified trigger        Relevant Medications    budesonide (RINOCORT AQUA) 32 MCG/ACT nasal spray    Thoracogenic scoliosis of thoracic region        Relevant Orders    XR spine thoracolumbar 2 vw          Add to [facility] list    Subjective:      Patient ID: Stefania Stone is a 63 y o  female  HPI  Here for wellness  Up to date with metabolic screening-reviewed today  Due for mammo and her gyn visit is coming up soon  Colonoscopy in 2016, 5 y repeat  Up to date with dental and eye exams    The following portions of the patient's history were reviewed and updated as appropriate: current medications, past family history, past medical history, past social history, past surgical history and problem list     Review of Systems   Constitutional: Negative for fatigue, fever and unexpected weight change  HENT: Negative for ear pain, hearing loss, sinus pressure, sinus pain and sore throat  Needs substitute to Qnasl   Respiratory: Negative for cough, shortness of breath and wheezing  Cardiovascular: Negative for chest pain, palpitations and leg swelling  Gastrointestinal: Negative for abdominal pain, constipation, diarrhea, nausea and vomiting  Endocrine:        Occasional hot flashes   Musculoskeletal: Positive for back pain (upper and lower back pain; lower thoracic spine appears curved in which she noticed recently)  Negative for arthralgias and myalgias     Neurological: Negative for dizziness and headaches  Psychiatric/Behavioral: Positive for sleep disturbance (no improvement on Ambien)  Objective:      /72   Pulse 81   Temp 98 2 °F (36 8 °C) (Oral)   Resp 14   Ht 5' 5 98" (1 676 m)   Wt 67 2 kg (148 lb 3 2 oz)   SpO2 98%   BMI 23 93 kg/m²          Physical Exam   Constitutional: She is oriented to person, place, and time  She appears well-developed and well-nourished  HENT:   Head: Normocephalic and atraumatic  Right Ear: External ear normal    Left Ear: External ear normal    Mouth/Throat: Oropharynx is clear and moist    Eyes: Conjunctivae are normal    Neck: Neck supple  Cardiovascular: Normal rate, regular rhythm and normal heart sounds  No murmur heard  Pulmonary/Chest: Effort normal and breath sounds normal  No respiratory distress  She has no wheezes  She has no rales  Abdominal: Soft  Bowel sounds are normal  She exhibits no distension and no mass  There is no tenderness  There is no rebound and no guarding  Musculoskeletal: Normal range of motion  She exhibits deformity  She exhibits no tenderness (thoracolumbar spine; thoracic scoliosis to the left)  Neurological: She is alert and oriented to person, place, and time  Skin: Skin is warm and dry  Psychiatric: She has a normal mood and affect   Her behavior is normal  Judgment and thought content normal

## 2019-08-30 ENCOUNTER — TELEPHONE (OUTPATIENT)
Dept: INTERNAL MEDICINE CLINIC | Facility: CLINIC | Age: 59
End: 2019-08-30

## 2019-08-30 DIAGNOSIS — L23.7 POISON IVY DERMATITIS: Primary | ICD-10-CM

## 2019-08-30 RX ORDER — PREDNISONE 10 MG/1
TABLET ORAL
Qty: 27 TABLET | Refills: 0 | Status: SHIPPED | OUTPATIENT
Start: 2019-08-30 | End: 2019-12-20

## 2019-08-30 NOTE — TELEPHONE ENCOUNTER
The patient has poison ivy on her face, hands and arms  She thought she could treat it with over the counter medications  They did not help  The poison got worse, she is asking if you could call in a prescript for her  She is visiting her daughter in [place] so the script will need to go to    [facility]  [address] [facility], [address] [place] [address]  Please notify the patient once the script has been called

## 2019-09-23 DIAGNOSIS — E03.9 HYPOTHYROIDISM, UNSPECIFIED TYPE: ICD-10-CM

## 2019-09-23 RX ORDER — LEVOTHYROXINE SODIUM 137 MCG
TABLET ORAL
Qty: 30 TABLET | Refills: 11 | Status: SHIPPED | OUTPATIENT
Start: 2019-09-23 | End: 2020-09-23 | Stop reason: SDUPTHER

## 2019-09-28 ENCOUNTER — OFFICE VISIT (OUTPATIENT)
Dept: URGENT CARE | Age: 59
End: 2019-09-28
Payer: COMMERCIAL

## 2019-09-28 VITALS
SYSTOLIC BLOOD PRESSURE: 122 MMHG | BODY MASS INDEX: 24.27 KG/M2 | WEIGHT: 151 LBS | RESPIRATION RATE: 16 BRPM | TEMPERATURE: 99.1 F | OXYGEN SATURATION: 98 % | DIASTOLIC BLOOD PRESSURE: 76 MMHG | HEIGHT: 66 IN | HEART RATE: 76 BPM

## 2019-09-28 DIAGNOSIS — J20.8 ACUTE BRONCHITIS DUE TO OTHER SPECIFIED ORGANISMS: Primary | ICD-10-CM

## 2019-09-28 DIAGNOSIS — J02.9 SORE THROAT: ICD-10-CM

## 2019-09-28 DIAGNOSIS — J01.00 ACUTE MAXILLARY SINUSITIS, RECURRENCE NOT SPECIFIED: ICD-10-CM

## 2019-09-28 LAB — S PYO AG THROAT QL: NEGATIVE

## 2019-09-28 PROCEDURE — 87070 CULTURE OTHR SPECIMN AEROBIC: CPT | Performed by: PHYSICIAN ASSISTANT

## 2019-09-28 PROCEDURE — 99213 OFFICE O/P EST LOW 20 MIN: CPT | Performed by: PHYSICIAN ASSISTANT

## 2019-09-28 PROCEDURE — 87880 STREP A ASSAY W/OPTIC: CPT | Performed by: PHYSICIAN ASSISTANT

## 2019-09-28 RX ORDER — CEFDINIR 300 MG/1
300 CAPSULE ORAL EVERY 12 HOURS SCHEDULED
Qty: 14 CAPSULE | Refills: 0 | Status: SHIPPED | OUTPATIENT
Start: 2019-09-28 | End: 2019-10-05

## 2019-09-28 NOTE — PATIENT INSTRUCTIONS
Take antibiotics as prescribed  Continue nasal saline spray for symptomatic treatment  Stay hydrated with lots of water/fluids  Follow-up with PCP in the next few days for reexamination and to ensure resolution of symptoms  Go to the ED if any fevers, unable to stay hydrated, abdominal pain, chest pain, shortness of breath, new or worsening symptoms or other concerning symptoms

## 2019-09-28 NOTE — PROGRESS NOTES
[facility]        NAME: Otis Coto is a 63 y o  female  : 1960    MRN: [id number]  DATE: 2019  TIME: 7:00 PM    Assessment and Plan   Acute bronchitis due to other specified organisms [J20 8]  1  Acute bronchitis due to other specified organisms  cefdinir (OMNICEF) 300 mg capsule   2  Sore throat  POCT rapid strepA    Throat culture   3  Acute maxillary sinusitis, recurrence not specified  cefdinir (OMNICEF) 300 mg capsule     Rapid strep negative, will treat acute sinusitis and bronchitis at this time    Patient Instructions     Take antibiotics as prescribed  Continue nasal saline spray for symptomatic treatment  Stay hydrated with lots of water/fluids  Follow-up with PCP in the next few days for reexamination and to ensure resolution of symptoms  Go to the ED if any fevers, unable to stay hydrated, abdominal pain, chest pain, shortness of breath, new or worsening symptoms or other concerning symptoms  Chief Complaint     Chief Complaint   Patient presents with    Cold Like Symptoms     Pt states has been coughing x 8 days with sore throat,pt has been feeling chills  History of Present Illness       28-year-old female presents with nasal congestion, sinus pressure, sore throat, intermittent mild dry cough x1 week  Patient states she has been taking her normal nasal spray with little relief  She has also been taking DayQuil and NyQuil as well as ibuprofen as needed for the sore throat with little relief  She denies any fevers but states she has been having some chills  She denies any chest pain, chest tightness, shortness of breath, GI/ symptoms or other complaints  Review of Systems   Review of Systems   Constitutional: Positive for chills  Negative for activity change, appetite change, fatigue and fever  HENT: Positive for congestion, rhinorrhea, sinus pressure and sore throat   Negative for ear pain, facial swelling, trouble swallowing and voice change  Eyes: Negative for discharge, itching and visual disturbance  Respiratory: Positive for cough  Negative for chest tightness, shortness of breath and wheezing  Cardiovascular: Negative for chest pain  Gastrointestinal: Negative for abdominal pain, diarrhea, nausea and vomiting  Musculoskeletal: Negative for back pain and neck pain  Skin: Negative for rash  Neurological: Negative for dizziness, syncope, weakness, numbness and headaches  All other systems reviewed and are negative          Current Medications       Current Outpatient Medications:     ALPRAZolam (XANAX) 0 25 mg tablet, Take 1 tablet (0 25 mg total) by mouth daily at bedtime as needed for anxiety, Disp: 30 tablet, Rfl: 0    budesonide (RINOCORT AQUA) 32 MCG/ACT nasal spray, 1 spray into each nostril daily, Disp: 8 6 g, Rfl: 1    estradiol (ESTRACE) 0 5 MG tablet, Take by mouth, Disp: , Rfl:     norethindrone (AYGESTIN) 5 mg tablet, Take by mouth, Disp: , Rfl:     SYNTHROID 137 MCG tablet, TAKE 1 TABLET BY MOUTH EVERY DAY ON EMPTY STOMACH, Disp: 30 tablet, Rfl: 11    cefdinir (OMNICEF) 300 mg capsule, Take 1 capsule (300 mg total) by mouth every 12 (twelve) hours for 7 days, Disp: 14 capsule, Rfl: 0    predniSONE 10 mg tablet, 40mg daily for 3 days then 30mg daily for 3 days then 20mg daily for 2 days then 10mg daily for 2 days (Patient not taking: Reported on 9/28/2019), Disp: 27 tablet, Rfl: 0    Current Allergies     Allergies as of 09/28/2019 - Reviewed 09/28/2019   Allergen Reaction Noted    Pollen extract  07/16/2013            The following portions of the patient's history were reviewed and updated as appropriate: allergies, current medications, past family history, past medical history, past social history, past surgical history and problem list      Past Medical History:   Diagnosis Date    Dyslipidemia     Last assessed: 7/30/15       Past Surgical History:   Procedure Laterality Date    ARTHROPLASTY Left 2008    with Prosthesis Trapezium  Thumb w/LRTI  Dr Jeremy Macias  Last assessed: 8/1/16    TONSILLECTOMY      Last assessed: 8/1/16       Family History   Problem Relation Age of Onset    Alzheimer's disease Mother     Hyperlipidemia Mother     Colon cancer Father          Medications have been verified  Objective   /76   Pulse 76   Temp 99 1 °F (37 3 °C) (Temporal)   Resp 16   Ht 5' 6" (1 676 m)   Wt 68 5 kg (151 lb)   SpO2 98%   BMI 24 37 kg/m²        Physical Exam     Physical Exam   Constitutional: She is oriented to person, place, and time  She appears well-developed and well-nourished  No distress  Smiling, nontoxic-appearing   HENT:   Head: Normocephalic and atraumatic  Right Ear: Tympanic membrane normal    Left Ear: Tympanic membrane normal    Mouth/Throat: Uvula is midline and mucous membranes are normal  No uvula swelling  Mild PND present with mildly erythematous posterior pharynx  Mild bilateral maxillary sinus pressure/discomfort to palpation  Airway patent, handling secretions  Eyes: Pupils are equal, round, and reactive to light  Neck: Normal range of motion  Neck supple  Cardiovascular: Normal rate, regular rhythm and normal heart sounds  Pulmonary/Chest: Effort normal and breath sounds normal  No respiratory distress  She has no wheezes  Neurological: She is alert and oriented to person, place, and time  Psychiatric: She has a normal mood and affect  Her behavior is normal    Nursing note and vitals reviewed

## 2019-09-30 LAB — BACTERIA THROAT CULT: NORMAL

## 2019-10-21 ENCOUNTER — TELEPHONE (OUTPATIENT)
Dept: INTERNAL MEDICINE CLINIC | Facility: CLINIC | Age: 59
End: 2019-10-21

## 2019-10-21 DIAGNOSIS — F51.01 PRIMARY INSOMNIA: ICD-10-CM

## 2019-10-21 RX ORDER — ALPRAZOLAM 0.5 MG/1
TABLET ORAL
Qty: 30 TABLET | Refills: 0 | Status: SHIPPED | OUTPATIENT
Start: 2019-10-21 | End: 2019-12-23 | Stop reason: SDUPTHER

## 2019-10-21 RX ORDER — ALPRAZOLAM 0.25 MG/1
TABLET ORAL
Qty: 30 TABLET | Refills: 0 | Status: SHIPPED | OUTPATIENT
Start: 2019-10-21 | End: 2019-10-21 | Stop reason: SDUPTHER

## 2019-10-21 NOTE — TELEPHONE ENCOUNTER
Patient is calling in regards to her script for the alprazolam   She was taking 2 tablets and said it only had a minimal effect  Patient is asking if you can increase the dose      Please advise

## 2019-11-04 DIAGNOSIS — Z12.31 BREAST CANCER SCREENING BY MAMMOGRAM: ICD-10-CM

## 2019-12-04 ENCOUNTER — TELEPHONE (OUTPATIENT)
Dept: INTERNAL MEDICINE CLINIC | Facility: CLINIC | Age: 59
End: 2019-12-04

## 2019-12-04 ENCOUNTER — APPOINTMENT (OUTPATIENT)
Dept: RADIOLOGY | Age: 59
End: 2019-12-04
Payer: COMMERCIAL

## 2019-12-04 DIAGNOSIS — M25.561 ACUTE PAIN OF RIGHT KNEE: Primary | ICD-10-CM

## 2019-12-04 DIAGNOSIS — M25.561 ACUTE PAIN OF RIGHT KNEE: ICD-10-CM

## 2019-12-04 PROCEDURE — 73564 X-RAY EXAM KNEE 4 OR MORE: CPT

## 2019-12-04 NOTE — TELEPHONE ENCOUNTER
Patient is not sure if she needs to see you or an ortho  She said she injured her right knee about 4 weeks ago she has been trying to nurse it back to health but as soon as she thinks it is better she try to do low impact exercises and this aggravates her knee causing it to be painful       Please advise

## 2019-12-05 DIAGNOSIS — M25.561 ACUTE PAIN OF RIGHT KNEE: Primary | ICD-10-CM

## 2019-12-05 NOTE — TELEPHONE ENCOUNTER
Patient called in she got the xray, but she asked if it was necessary for her to come in for a visit? Her appointment is today at 4:30  She has had the pain for over a month and feels that she needs to see a specialist, she did not want to come in if all that was going to be done is her getting a referral   She also stated that she know the xray is not going to show anything because her knee is not fracture or broke, she understands that she wouldn't be able to get and MRI or further test without the xray but she did not want to pay a co-pay if the visit is just to get referred       Not sure how to address, please advise

## 2019-12-05 NOTE — TELEPHONE ENCOUNTER
LMOM with details, and gave her Ortho information also canceled appointment for this afternoon  Done

## 2019-12-13 ENCOUNTER — OFFICE VISIT (OUTPATIENT)
Dept: OBGYN CLINIC | Facility: CLINIC | Age: 59
End: 2019-12-13
Payer: COMMERCIAL

## 2019-12-13 VITALS
DIASTOLIC BLOOD PRESSURE: 71 MMHG | HEIGHT: 66 IN | BODY MASS INDEX: 24.11 KG/M2 | WEIGHT: 150 LBS | HEART RATE: 82 BPM | SYSTOLIC BLOOD PRESSURE: 122 MMHG

## 2019-12-13 DIAGNOSIS — M25.561 ACUTE PAIN OF RIGHT KNEE: Primary | ICD-10-CM

## 2019-12-13 PROCEDURE — 99243 OFF/OP CNSLTJ NEW/EST LOW 30: CPT | Performed by: ORTHOPAEDIC SURGERY

## 2019-12-13 NOTE — PROGRESS NOTES
Patient Name:  Nick Shell  MRN:  [id number]    Assessment & Plan     Right knee pain that began while running  Possible lateral meniscus tear  1  MRI right knee for further evaluation  2  Continue rest, activity modification, and anti-inflammatories as needed  3  Due to significant pain with motion patient is not likely to tolerate physical therapy  4  Follow-up after MRI  Chief Complaint     Right knee pain    History of the Present Illness     Nick Shell is a 63 y o  female seen in consultation by orthopedics requested by Dr Brittani James for evaluation of patient's right knee  Patient has been experiencing right knee pain for approximately five weeks  The pain began after running on a treadmill  She denies any specific injury or trauma  She states the pain is localized primarily to the lateral aspect of the knee  Pain is worse with walking up and down steps as well as pivoting  She notes weakness secondary to pain  She denies instability  She also notes painful weight-bearing  She states the pain is currently limiting her ability to perform daily activities as well as exercise  She denies any significant swelling or stiffness  She has taken ibuprofen without relief  No numbness or tingling  No fevers or chills  Physical Exam     /71   Pulse 82   Ht 5' 6" (1 676 m)   Wt 68 kg (150 lb)   BMI 24 21 kg/m²     Right knee:  No gross deformity  Skin intact  No erythema ecchymosis or swelling  No effusion  No joint line tenderness  Range of motion includes full extension and flexion to 120°  Discomfort noted with flexion  Stable to varus and valgus stress  Stable Lachman test   Significant discomfort and clicking noted with Tracy's test   Negative patellar apprehension test   Sensation intact right lower extremity  Skin warm and well perfused  Eyes: No scleral icterus  Neck: Supple  Lungs: Normal respiratory effort    Cardiovascular: Capillary refill is less than 2 seconds  Skin: Intact without erythema  Neurologic: Sensation intact to light touch  Psychiatric: Mood and affect are appropriate  Data Review     I have personally reviewed pertinent films in PACS, and my interpretation follows:    X-rays right knee 12/4/19 reveals no acute osseous abnormalities  No fractures noted  No significant degenerative changes noted  Past Medical History:   Diagnosis Date    Dyslipidemia     Last assessed: 7/30/15       Past Surgical History:   Procedure Laterality Date    ARTHROPLASTY Left 2008    with Prosthesis Trapezium  Thumb w/LRTI  Dr Som Cai  Last assessed: 8/1/16    TONSILLECTOMY      Last assessed: 8/1/16       Allergies   Allergen Reactions    Pollen Extract        Current Outpatient Medications on File Prior to Visit   Medication Sig Dispense Refill    ALPRAZolam (XANAX) 0 5 mg tablet 1-2 tabs at night prn insomnia 30 tablet 0    budesonide (RINOCORT AQUA) 32 MCG/ACT nasal spray 1 spray into each nostril daily 8 6 g 1    estradiol (ESTRACE) 0 5 MG tablet Take by mouth      norethindrone (AYGESTIN) 5 mg tablet Take by mouth      predniSONE 10 mg tablet 40mg daily for 3 days then 30mg daily for 3 days then 20mg daily for 2 days then 10mg daily for 2 days (Patient not taking: Reported on 9/28/2019) 27 tablet 0    SYNTHROID 137 MCG tablet TAKE 1 TABLET BY MOUTH EVERY DAY ON EMPTY STOMACH 30 tablet 11     No current facility-administered medications on file prior to visit  Social History     Tobacco Use    Smoking status: Never Smoker    Smokeless tobacco: Never Used   Substance Use Topics    Alcohol use: Yes     Comment: One beverage daily 7/2015    Drug use: No       Family History   Problem Relation Age of Onset    Alzheimer's disease Mother     Hyperlipidemia Mother     Colon cancer Father        Review of Systems     As stated in the HPI  All other systems were reviewed and are negative        Scribe Attestation    I,:   Jailyn Pantoja PA-C am acting as a scribe while in the presence of the attending physician :        I,:   Jay Gastelum MD personally performed the services described in this documentation    as scribed in my presence :

## 2019-12-17 ENCOUNTER — HOSPITAL ENCOUNTER (OUTPATIENT)
Dept: MRI IMAGING | Facility: HOSPITAL | Age: 59
Discharge: HOME | End: 2019-12-17
Payer: COMMERCIAL

## 2019-12-17 DIAGNOSIS — M25.561 ACUTE PAIN OF RIGHT KNEE: ICD-10-CM

## 2019-12-17 PROCEDURE — 73721 MRI JNT OF LWR EXTRE W/O DYE: CPT

## 2019-12-20 ENCOUNTER — TELEPHONE (OUTPATIENT)
Dept: INTERNAL MEDICINE CLINIC | Facility: CLINIC | Age: 59
End: 2019-12-20

## 2019-12-20 ENCOUNTER — OFFICE VISIT (OUTPATIENT)
Dept: OBGYN CLINIC | Facility: CLINIC | Age: 59
End: 2019-12-20
Payer: COMMERCIAL

## 2019-12-20 VITALS
HEART RATE: 84 BPM | WEIGHT: 150 LBS | DIASTOLIC BLOOD PRESSURE: 73 MMHG | BODY MASS INDEX: 24.11 KG/M2 | SYSTOLIC BLOOD PRESSURE: 116 MMHG | HEIGHT: 66 IN

## 2019-12-20 DIAGNOSIS — M25.561 RIGHT KNEE PAIN, UNSPECIFIED CHRONICITY: Primary | ICD-10-CM

## 2019-12-20 DIAGNOSIS — M25.561 ACUTE PAIN OF RIGHT KNEE: Primary | ICD-10-CM

## 2019-12-20 PROCEDURE — 99212 OFFICE O/P EST SF 10 MIN: CPT | Performed by: ORTHOPAEDIC SURGERY

## 2019-12-20 NOTE — TELEPHONE ENCOUNTER
Patient is asking if you can put an order in for Physical Therapy for her knee pain  She wanted to know if you could review her recent MRI and base it off of that  Patient saw ortho today and wasn't pleased with the physician she saw      Please advise

## 2019-12-20 NOTE — TELEPHONE ENCOUNTER
The MRI shows swelling in the bone marrow where the medial meniscus is attached  This could be a sign that it could tear but is not torn at this time  I ordered the PT so she can schedule anytime

## 2019-12-20 NOTE — PROGRESS NOTES
Patient Name:  Garry Bejarano  MRN:  [id number]    Assessment & Plan     Right knee marrow edema posterior tibia  1  No surgical indications noted on MRI  2  Offered referral to physical therapy or prescription for NSAIDs but patient declined  3  Avoid high-impact or painful activities until symptoms resolve  Gradual resolution is expected based on objective findings  4  Follow-up as needed  Subjective     60-year-old female returns to the office today for follow-up regarding her right knee  She recently underwent an MRI and is here to review the results  She still notes persistent discomfort in the knee worse with activities  She has been resting the knee  General ROS:  Negative for fever or chills  Neurological ROS:  Negative for numbness or tingling  Objective     /73   Pulse 84   Ht 5' 6" (1 676 m)   Wt 68 kg (150 lb)   BMI 24 21 kg/m²       Counseling     The patient was counseled regarding diagnostic results, impressions, patient/family education, instructions for management, risks and benefits of treatment options, and prognosis  The total time of the encounter was 10 minutes, and more than 50% of that time was spent in counseling and coordination of care  Data Review     I have personally reviewed pertinent films in PACS, and my interpretation follows  MRI right knee 12/17/19 reveals evidence of bone marrow edema at the medial meniscus posterior root attachment  No tears of the meniscus are noted  No degenerative changes      Social History     Tobacco Use    Smoking status: Never Smoker    Smokeless tobacco: Never Used   Substance Use Topics    Alcohol use: Yes     Comment: One beverage daily 7/2015    Drug use: No       Scribe Attestation    I,:   Armani Shen PA-C am acting as a scribe while in the presence of the attending physician :        I,:   Romero Barkley MD personally performed the services described in this documentation    as scribed in my presence :

## 2019-12-23 DIAGNOSIS — F51.01 PRIMARY INSOMNIA: ICD-10-CM

## 2019-12-23 RX ORDER — ALPRAZOLAM 0.5 MG/1
TABLET ORAL
Qty: 30 TABLET | Refills: 0 | Status: SHIPPED | OUTPATIENT
Start: 2019-12-23 | End: 2020-02-12 | Stop reason: SDUPTHER

## 2019-12-23 NOTE — TELEPHONE ENCOUNTER
NEXT OV: 8/13  LAST OV: 8/12    PDMP CHECKED  LAST FILL: 10/24  QUANT: 30    Prescriptions   Filled  ID  Written  Drug  QTY  Days  Prescriber  Rx #  Pharmacy *  Refills  Daily Dose  Pymt Type      10/24/2019  1  10/21/2019  ALPRAZOLAM 0 5 MG TABLET  30 0  15  [facility]  [id number]  [facility] (0181)  0   [place]  [place]

## 2019-12-26 ENCOUNTER — EVALUATION (OUTPATIENT)
Dept: PHYSICAL THERAPY | Age: 59
End: 2019-12-26
Payer: COMMERCIAL

## 2019-12-26 DIAGNOSIS — M25.561 ACUTE PAIN OF RIGHT KNEE: Primary | ICD-10-CM

## 2019-12-26 PROCEDURE — 97035 APP MDLTY 1+ULTRASOUND EA 15: CPT

## 2019-12-26 PROCEDURE — G8990 OTHER PT/OT CURRENT STATUS: HCPCS

## 2019-12-26 PROCEDURE — 97162 PT EVAL MOD COMPLEX 30 MIN: CPT

## 2019-12-26 PROCEDURE — G8991 OTHER PT/OT GOAL STATUS: HCPCS

## 2019-12-27 NOTE — PROGRESS NOTES
PT Evaluation     Today's date: 2019  Patient name: Jitendra Canales  : 1960  MRN: [id number]  Referring provider: Alaina Barber MD  Dx:   Encounter Diagnosis     ICD-10-CM    1  Acute pain of right knee M25 561 Ambulatory referral to Physical Therapy                  Assessment  Assessment details: The pt is a 63year old female referred to outpt PT with a diagnosis of right knee pain with onset noted in early  upon change in fitness from swimming to running on a treadmill per pt report  PT is warranted to address the above stated deficits in efforts to reduce pain and return to all prior activity including a non antalgic gait  Impairments: abnormal gait, abnormal or restricted ROM, abnormal movement, activity intolerance, impaired physical strength, lacks appropriate home exercise program and pain with function  Other impairment: decreased donning of sneakers and socks , pt can only wear sneakers due to pain with shoes  Functional limitations: decreased right knee bending and weight bearing tolerance, + antalgic gait, decreased sit to stand and stand to sit  Symptom irritability: highUnderstanding of Dx/Px/POC: good   Prognosis: good    Plan  Patient would benefit from: PT eval and skilled physical therapy  Referral necessary: No  Planned modality interventions: cryotherapy and ultrasound  Other planned modality interventions: modalities as needed  Planned therapy interventions: manual therapy, neuromuscular re-education, strengthening, stretching, therapeutic activities, therapeutic exercise and home exercise program  Other planned therapy interventions: continue aquatic exer at [facility],  kinesiotaping, james taping prn  Frequency: 2x week  Duration in weeks: 8  Treatment plan discussed with: patient        Subjective Evaluation    History of Present Illness  Onset date: early NOv of 2019    Mechanism of injury: The pt is a 63year old female referred to outpt PT with a dx of right knee pain and recent mri revealing right knee bone marrow edema  At the medial meniscus posterior root attachment  She reports onset in early 2019 when the pt stopped swimming in the [facility] wellness pool believing the pool to be too cold for her and subsequently beginning to run appropx 5 days a week and then utilizing the eliptical machine when the treadmill became painful  She is observed with an antalgic gait with decreased right knee flexion observed  Pain is located in the right lateral knee joint  with right flexion and certain twisting movements and weight bearing with knee flexed on mat table with knee extension movement   She reports a sharp pain with sit to stand and stand to sit from the commode, bed  And with donning shoes and socks which is very difficult to perform currently          Not a recurrent problem   Quality of life: good    Pain  Current pain ratin  At best pain ratin  At worst pain ratin  Location: right lateral knee pain   Quality: sharp, radiating, tight, pulling and pressure  Relieving factors: change in position, ice, rest and heat  Aggravating factors: stair climbing, walking, standing, sitting and running  Progression: no change    Exercise history: avid exerciser 5 days a week  pt has access to therapy pool and wellness center fitness equipment    Treatments  Current treatment: physical therapy  Patient Goals  Patient goals for therapy: decreased pain, increased motion, increased strength, independence with ADLs/IADLs and return to sport/leisure activities  Patient goal: reduction of pain by 50 percent in 4 weeks         Objective     Active Range of Motion   Left Hip   Flexion: WFL  Extension: WFL  Abduction: WFL  Adduction: WFL    Right Hip   Flexion: WFL  Extension: WFL  Abduction: WFL  Adduction: WFL  Left Knee   Extension: WFL    Right Knee   Flexion: 95 degrees with pain  Extension: -5 degrees   Left Ankle/Foot   Dorsiflexion (ke): WFL  Plantar flexion: WFL    Right Ankle/Foot   Dorsiflexion (ke): 15 degrees with pain  Plantar flexion: [facility]    Additional Active Range of Motion Details  slr 0-90 degrees  Right lateral knee joint pain and slight post pain  Pain in right lateral knee with dorsiflexion  Goal 20 degrees right ankle df    Strength/Myotome Testing     Left Hip   Planes of Motion   Flexion: 5  Extension: 5  Abduction: 5  Adduction: 5    Right Hip   Planes of Motion   Flexion: 4  Extension: 4  Abduction: 4+  Adduction: 4    Left Knee   Flexion: 5  Extension: 5    Right Knee   Flexion: 3+  Extension: 3+    Left Ankle/Foot   Dorsiflexion: 5  Plantar flexion: 5    Right Ankle/Foot   Dorsiflexion: 3+  Plantar flexion: 2+  Inversion: 3+  Eversion: 3+    Additional Strength Details  Pain with ankle right df/pf and inversion     Ambulation     Ambulation: Level Surfaces   Ambulation without assistive device: independent    Additional Level Surfaces Ambulation Details  100 ft independent + antalgic gait with decreased right knee flexion and decreased weight bearing right le       Flowsheet Rows      Most Recent Value   PT/OT G-Codes   Current Score  34   Projected Score  60   FOTO information reviewed  Yes   Assessment Type  Evaluation   G code set  Other PT/OT Primary   Other PT Primary Current Status ()  CL   Other PT Primary Goal Status ()  CK             Precautions: allergies :Pollen extract, PMH: hypothyroidism, insomnia, left thumb arthroplasty 2008, tonsillectomy,, hx of right sciatic pain in the past ,  right knee pain since early Nov of 2019, pt belongs to [facility],MRI  Reveals bone marrow edema at the medial meniscus posterior root attachment, can be seen with impending root tear        Manual  12/26/2019             MAJO james taping for relief of patellar fat pad perf  man                                                       Exercise Diary  12/26/            Right le slr hip flexion add toe out slr 3 x 10            Quad set right le  30 reps 5 sec hold             Hip abd, add, prone hip ext              laq             Long sit hamstring stretch to tolerance only             heelcord stretch to tolerance only bilateral              Right le heelslides                                                                                                                                                                                           Modalities  12/26             right post lateral knee 1 5 w/cm2  cont 10 min

## 2019-12-30 ENCOUNTER — OFFICE VISIT (OUTPATIENT)
Dept: PHYSICAL THERAPY | Age: 59
End: 2019-12-30
Payer: COMMERCIAL

## 2019-12-30 DIAGNOSIS — M25.561 ACUTE PAIN OF RIGHT KNEE: Primary | ICD-10-CM

## 2019-12-30 PROCEDURE — 97110 THERAPEUTIC EXERCISES: CPT

## 2019-12-30 NOTE — PROGRESS NOTES
Daily Note     Today's date: 2019  Patient name: Rajni Still  : 1960  MRN: [id number]  Referring provider: Layne Barakat MD  Dx:   Encounter Diagnosis     ICD-10-CM    1  Acute pain of right knee M25 561                   Subjective: Pt  Did not like James taping stating she thought it caused increased night pain  Trial Kinesio taping today  Objective: See treatment diary below      Assessment: Tolerated treatment well  Patient would benefit from continued PT      Plan: Continue per plan of care  Precautions: allergies :Pollen extract, PMH: hypothyroidism, insomnia, left thumb arthroplasty , tonsillectomy,, hx of right sciatic pain in the past ,  right knee pain since early , pt belongs to [facility],MRI  Reveals bone marrow edema at the medial meniscus posterior root attachment, can be seen with impending root tear        Manual  2019            MAJO james taping for relief of patellar fat pad perf  man kinesio                                                      Exercise Diary             Right le slr hip flexion add toe out slr 3 x 10 30x           Quad set right le  30 reps 5 sec hold  5sec x 30           Hip abd, add, prone hip ext   X 30           laq  X 30           Long sit hamstring stretch to tolerance only  20SEC X 5           heelcord stretch to tolerance only bilateral   20SEC X 5           Right le heelslides   20x                                                                                                                                                                                        Modalities              Us right post lateral knee 1 5 w/cm2  cont 10 min

## 2019-12-31 ENCOUNTER — OFFICE VISIT (OUTPATIENT)
Dept: PHYSICAL THERAPY | Age: 59
End: 2019-12-31
Payer: COMMERCIAL

## 2019-12-31 DIAGNOSIS — M25.561 ACUTE PAIN OF RIGHT KNEE: Primary | ICD-10-CM

## 2019-12-31 PROCEDURE — 97110 THERAPEUTIC EXERCISES: CPT

## 2019-12-31 PROCEDURE — 97035 APP MDLTY 1+ULTRASOUND EA 15: CPT

## 2019-12-31 NOTE — PROGRESS NOTES
Daily Note     Today's date: 2019  Patient name: Yamileth Sultana  : 1960  MRN: [id number]  Referring provider: Denis Lazo MD  Dx:   Encounter Diagnosis     ICD-10-CM    1  Acute pain of right knee M25 561                   Subjective: "my right knee pain is about a 2 it is less "      Objective: See treatment diary below      Assessment: Tolerated treatment well  Patient would benefit from continued PT      Plan: Continue per plan of care  Precautions: allergies :Pollen extract, PMH: hypothyroidism, insomnia, left thumb arthroplasty , tonsillectomy,, hx of right sciatic pain in the past ,  right knee pain since early , pt belongs to [facility],MRI  Reveals bone marrow edema at the medial meniscus posterior root attachment, can be seen with impending root tear        Manual  2019           I ward james taping for relief of patellar fat pad perf  man kinesio No taping today                                                     Exercise Diary            Right le slr hip flexion add toe out slr 3 x 10 30x           Quad set right le  30 reps 5 sec hold  5sec x 30           Hip abd, add, prone hip ext   X 30           laq  X 30           Long sit hamstring stretch to tolerance only  20SEC X 5 5 reps 10 sec hold at least          heelcord stretch to tolerance only bilateral   20SEC X 5 runners stretch in standing 20 sec hold x 1          Right le heelslides   20x           cybex wts             Hamstring curls   35lb 2 x 10          Hp abd    40 lb 2 x 10          Leg ext    15 lb 1 set of 10  , 10 lb 1 set of 10          Step ups 2 rails right le   1 set of 10                                                                                                                      Modalities            Us right post lateral knee 1 5 w/cm2  cont 10 min   10 min

## 2020-01-07 ENCOUNTER — APPOINTMENT (OUTPATIENT)
Dept: PHYSICAL THERAPY | Age: 60
End: 2020-01-07
Payer: COMMERCIAL

## 2020-01-09 ENCOUNTER — OFFICE VISIT (OUTPATIENT)
Dept: PHYSICAL THERAPY | Age: 60
End: 2020-01-09
Payer: COMMERCIAL

## 2020-01-09 DIAGNOSIS — M25.561 ACUTE PAIN OF RIGHT KNEE: Primary | ICD-10-CM

## 2020-01-09 PROCEDURE — 97750 PHYSICAL PERFORMANCE TEST: CPT

## 2020-01-09 PROCEDURE — 97035 APP MDLTY 1+ULTRASOUND EA 15: CPT

## 2020-01-10 NOTE — PROGRESS NOTES
PT Re-Evaluation     Today's date: 2020  Patient name: Spencer Peraza  : 1960  MRN: [id number]  Referring provider: Jazmyn Benavidez MD  Dx:   Encounter Diagnosis     ICD-10-CM    1  Acute pain of right knee M25 561                   Assessment  Assessment details: The pt has made substantial progress with right knee pain reduction now at a level 1 to 0  She has returned to aquatic exer without pain  The pt is refraining for treadmill running at this time for further healing  She was able to tolerate cybex weight training without increased edema or pain of the right knee  PT to review today her home exer programming  Request one final PT session in addition to today as pt now to increase activity level in her fitness center at this time  Impairments: activity intolerance  Other impairment: decreased tolerance to running and plyometric exer out of the water     Functional limitations: decreased tolerance to jumping, impact loading quick repetitive bending right kneeUnderstanding of Dx/Px/POC: good   Prognosis: good    Goals  1 independent with home exer program in 2 weeks met  2achieve non antalgic gait in two weeks  Now met    ltg right le 5/5 mmt in 4 weeks met  Right knee flex /ext wnl's   Without increased pain in 4 weeks now met today  Reduction of pain by 80 percent in 4 weeks met    Plan  Patient would benefit from: skilled physical therapy and PT eval  Referral necessary: No  Planned therapy interventions: therapeutic activities, therapeutic exercise, stretching, strengthening, neuromuscular re-education, home exercise program and graded activity  Other planned therapy interventions: cybex weight training , closed kinetic chain exer  , plyometric exer  Frequency: 2x week  Duration in weeks: 2  Treatment plan discussed with: patient        Subjective Evaluation    History of Present Illness  Mechanism of injury: The pt reports a substantial improvement with right knee pain reduction today in addition to improved knee rom and mmt strength  Recommend review of current home exer program this session today  And one additional session of PT then discharge to home exer program   The pt has returned to aquatic exer for one session with no increase in pain  Not a recurrent problem   Quality of life: good    Pain  Current pain ratin  At best pain ratin  At worst pain ratin  Location: right knee  Quality: dull ache and pulling  Relieving factors: change in position, ice, heat and rest  Aggravating factors: running  Progression: improved    Treatments  Current treatment: physical therapy  Patient Goals  Patient goals for therapy: decreased pain, increased strength, increased motion, independence with ADLs/IADLs and return to sport/leisure activities  Patient goal: pt with right knee 5/5 mmt   and full right knee flex/ext achieved          Objective     Active Range of Motion   Left Hip   Flexion: WFL  Extension: WFL  Abduction: WFL  Adduction: WFL    Right Hip   Flexion: WFL  Extension: WFL  Abduction: WFL  Adduction: WFL    Right Knee   Flexion: WFL  Extension: WFL    Right Ankle/Foot   Normal active range of motion    Strength/Myotome Testing     Left Hip   Normal muscle strength    Right Hip   Normal muscle strength    Right Knee   Flexion: 5  Extension: 5    Right Ankle/Foot   Dorsiflexion: 5  Plantar flexion: 5    Ambulation     Ambulation: Level Surfaces   Ambulation without assistive device: independent    Additional Level Surfaces Ambulation Details  +1000ft independent non antalgic gait achieved             Precautions: pollen extract allergy      Manual  20             reassessment of status                                                                    Exercise Diary  2020            Prom knee flexion and extension  Prone and supine  90-90 position Modalities  1/9            US 1 5 w/cm2 post right knee joint 15 min continuous

## 2020-02-12 DIAGNOSIS — F51.01 PRIMARY INSOMNIA: ICD-10-CM

## 2020-02-12 RX ORDER — ALPRAZOLAM 0.5 MG/1
TABLET ORAL
Qty: 30 TABLET | Refills: 0 | Status: SHIPPED | OUTPATIENT
Start: 2020-02-12 | End: 2020-03-23 | Stop reason: SDUPTHER

## 2020-03-23 DIAGNOSIS — F51.01 PRIMARY INSOMNIA: ICD-10-CM

## 2020-03-25 RX ORDER — ALPRAZOLAM 0.5 MG/1
TABLET ORAL
Qty: 30 TABLET | Refills: 0 | Status: SHIPPED | OUTPATIENT
Start: 2020-03-25 | End: 2020-05-26 | Stop reason: SDUPTHER

## 2020-04-20 ENCOUNTER — TELEPHONE (OUTPATIENT)
Dept: INTERNAL MEDICINE CLINIC | Facility: CLINIC | Age: 60
End: 2020-04-20

## 2020-04-20 DIAGNOSIS — J30.89 SEASONAL ALLERGIC RHINITIS DUE TO OTHER ALLERGIC TRIGGER: Primary | ICD-10-CM

## 2020-04-23 ENCOUNTER — TELEPHONE (OUTPATIENT)
Dept: INTERNAL MEDICINE CLINIC | Facility: CLINIC | Age: 60
End: 2020-04-23

## 2020-05-04 ENCOUNTER — TELEPHONE (OUTPATIENT)
Dept: INTERNAL MEDICINE CLINIC | Facility: CLINIC | Age: 60
End: 2020-05-04

## 2020-05-26 DIAGNOSIS — F51.01 PRIMARY INSOMNIA: ICD-10-CM

## 2020-05-27 RX ORDER — ALPRAZOLAM 0.5 MG/1
TABLET ORAL
Qty: 90 TABLET | Refills: 0 | Status: SHIPPED | OUTPATIENT
Start: 2020-05-27 | End: 2020-08-12

## 2020-06-05 ENCOUNTER — TELEMEDICINE (OUTPATIENT)
Dept: INTERNAL MEDICINE CLINIC | Facility: CLINIC | Age: 60
End: 2020-06-05
Payer: COMMERCIAL

## 2020-06-05 VITALS — BODY MASS INDEX: 23.78 KG/M2 | HEIGHT: 66 IN | WEIGHT: 148 LBS

## 2020-06-05 DIAGNOSIS — J02.9 SORE THROAT: ICD-10-CM

## 2020-06-05 DIAGNOSIS — Z20.828 EXPOSURE TO SARS-ASSOCIATED CORONAVIRUS: ICD-10-CM

## 2020-06-05 DIAGNOSIS — Z20.828 EXPOSURE TO SARS-ASSOCIATED CORONAVIRUS: Primary | ICD-10-CM

## 2020-06-05 PROCEDURE — 99213 OFFICE O/P EST LOW 20 MIN: CPT | Performed by: NURSE PRACTITIONER

## 2020-06-05 PROCEDURE — U0003 INFECTIOUS AGENT DETECTION BY NUCLEIC ACID (DNA OR RNA); SEVERE ACUTE RESPIRATORY SYNDROME CORONAVIRUS 2 (SARS-COV-2) (CORONAVIRUS DISEASE [COVID-19]), AMPLIFIED PROBE TECHNIQUE, MAKING USE OF HIGH THROUGHPUT TECHNOLOGIES AS DESCRIBED BY CMS-2020-01-R: HCPCS | Performed by: NURSE PRACTITIONER

## 2020-06-05 RX ORDER — CLOTRIMAZOLE AND BETAMETHASONE DIPROPIONATE 10; .64 MG/G; MG/G
CREAM TOPICAL 2 TIMES DAILY
COMMUNITY
Start: 2020-05-04 | End: 2021-08-16 | Stop reason: SDUPTHER

## 2020-06-07 LAB — SARS-COV-2 RNA SPEC QL NAA+PROBE: NOT DETECTED

## 2020-06-08 ENCOUNTER — TELEPHONE (OUTPATIENT)
Dept: INTERNAL MEDICINE CLINIC | Facility: CLINIC | Age: 60
End: 2020-06-08

## 2020-06-22 ENCOUNTER — EVALUATION (OUTPATIENT)
Dept: PHYSICAL THERAPY | Age: 60
End: 2020-06-22
Payer: COMMERCIAL

## 2020-06-22 DIAGNOSIS — M25.561 ACUTE BILATERAL KNEE PAIN: Primary | ICD-10-CM

## 2020-06-22 DIAGNOSIS — M25.562 ACUTE BILATERAL KNEE PAIN: Primary | ICD-10-CM

## 2020-06-22 PROCEDURE — 97140 MANUAL THERAPY 1/> REGIONS: CPT

## 2020-06-22 PROCEDURE — 97162 PT EVAL MOD COMPLEX 30 MIN: CPT

## 2020-06-24 ENCOUNTER — OFFICE VISIT (OUTPATIENT)
Dept: PHYSICAL THERAPY | Age: 60
End: 2020-06-24
Payer: COMMERCIAL

## 2020-06-24 DIAGNOSIS — M25.561 ACUTE PAIN OF BOTH KNEES: Primary | ICD-10-CM

## 2020-06-24 DIAGNOSIS — M25.562 ACUTE PAIN OF BOTH KNEES: Primary | ICD-10-CM

## 2020-06-24 PROCEDURE — 97110 THERAPEUTIC EXERCISES: CPT

## 2020-06-24 PROCEDURE — 97035 APP MDLTY 1+ULTRASOUND EA 15: CPT

## 2020-06-24 PROCEDURE — 97140 MANUAL THERAPY 1/> REGIONS: CPT

## 2020-06-30 ENCOUNTER — OFFICE VISIT (OUTPATIENT)
Dept: PHYSICAL THERAPY | Age: 60
End: 2020-06-30
Payer: COMMERCIAL

## 2020-06-30 DIAGNOSIS — M25.562 ACUTE PAIN OF BOTH KNEES: Primary | ICD-10-CM

## 2020-06-30 DIAGNOSIS — M25.561 ACUTE PAIN OF BOTH KNEES: Primary | ICD-10-CM

## 2020-06-30 PROCEDURE — 97110 THERAPEUTIC EXERCISES: CPT

## 2020-06-30 PROCEDURE — 97035 APP MDLTY 1+ULTRASOUND EA 15: CPT

## 2020-07-03 ENCOUNTER — OFFICE VISIT (OUTPATIENT)
Dept: PHYSICAL THERAPY | Age: 60
End: 2020-07-03
Payer: COMMERCIAL

## 2020-07-03 DIAGNOSIS — M25.562 CHRONIC PAIN OF BOTH KNEES: Primary | ICD-10-CM

## 2020-07-03 DIAGNOSIS — M25.561 CHRONIC PAIN OF BOTH KNEES: Primary | ICD-10-CM

## 2020-07-03 DIAGNOSIS — G89.29 CHRONIC PAIN OF BOTH KNEES: Primary | ICD-10-CM

## 2020-07-03 PROCEDURE — 97035 APP MDLTY 1+ULTRASOUND EA 15: CPT

## 2020-07-03 PROCEDURE — 97110 THERAPEUTIC EXERCISES: CPT

## 2020-07-03 PROCEDURE — 97140 MANUAL THERAPY 1/> REGIONS: CPT

## 2020-07-03 NOTE — PROGRESS NOTES
Daily Note     Today's date: 7/3/2020  Patient name: Anabell Haas  : 1960  MRN: [id number]  Referring provider: Misa Ha MD  Dx:   Encounter Diagnosis     ICD-10-CM    1  Chronic pain of both knees M25 561     M25 562     G89 29                   Subjective: "i'm going to try things on my own after today I think "  PT will hold charge open for 2 weeks potential discharge      Objective: See treatment diary below      Assessment: Tolerated treatment well  Patient would benefit from continued PT      Plan: Pt to hold for 2 weeks with potential d/c  Precautions: allergies to pollen extract, PMH: hypothyroidism, prior hx right knee pain,  Insomnia,       Manuals 6/22/20 6/24 6/30 7/3         graston to bilateral knee joints I wilsonal   lolita Mckeon taping for right knee lateral glide and ap moment man man man                                    Neuro Re-Ed             slr , and toe out slr bilateral  2 sets of 15 2 sets 15           Hip abd, add, prone hip ext with and without knee flexion  2 sets of 15 2 sets of 15          ITB stretch in sidelying and in standing bilateral  30 sec x 1            Step ups  instructed heelarch point  2 x 10         90-90 hamstring stretch, heelcord stretch with strap  5 hold 10 sec 5 hold 10 sec          Backwards walking              squats   10 2 x 10         Ther Ex             Walking with cable resistance 20 lbs fwd, bwd, sidestepping   5 reps each 10 feet            Quad, glut adductor sets   30 reps            cybex weights             Leg press   75# 3 x 10          Leg ext    20#1 set 10 full range, 1 set of 10 nonpainful range          Hip abd   40# 2 sets 10                                    Ther Activity                                       Gait Training                                       Modalities             Us infrapatellar border prn 1 5 w/cm2 cont  10 min right medialknee joint    15 min total medial and lateral knee joint 15 in bilateral knee joints

## 2020-07-07 ENCOUNTER — APPOINTMENT (OUTPATIENT)
Dept: PHYSICAL THERAPY | Age: 60
End: 2020-07-07
Payer: COMMERCIAL

## 2020-07-10 ENCOUNTER — APPOINTMENT (OUTPATIENT)
Dept: PHYSICAL THERAPY | Age: 60
End: 2020-07-10
Payer: COMMERCIAL

## 2020-08-04 ENCOUNTER — APPOINTMENT (OUTPATIENT)
Dept: LAB | Age: 60
End: 2020-08-04
Payer: COMMERCIAL

## 2020-08-04 DIAGNOSIS — E89.0 POSTOPERATIVE HYPOTHYROIDISM: ICD-10-CM

## 2020-08-04 LAB
ALBUMIN SERPL BCP-MCNC: 3.8 G/DL (ref 3.5–5)
ALP SERPL-CCNC: 58 U/L (ref 46–116)
ALT SERPL W P-5'-P-CCNC: 22 U/L (ref 12–78)
ANION GAP SERPL CALCULATED.3IONS-SCNC: 4 MMOL/L (ref 4–13)
AST SERPL W P-5'-P-CCNC: 13 U/L (ref 5–45)
BASOPHILS # BLD AUTO: 0.02 THOUSANDS/ΜL (ref 0–0.1)
BASOPHILS NFR BLD AUTO: 1 % (ref 0–1)
BILIRUB SERPL-MCNC: 0.46 MG/DL (ref 0.2–1)
BUN SERPL-MCNC: 19 MG/DL (ref 5–25)
CALCIUM SERPL-MCNC: 9.1 MG/DL (ref 8.3–10.1)
CHLORIDE SERPL-SCNC: 106 MMOL/L (ref 100–108)
CHOLEST SERPL-MCNC: 233 MG/DL (ref 50–200)
CO2 SERPL-SCNC: 27 MMOL/L (ref 21–32)
CREAT SERPL-MCNC: 0.7 MG/DL (ref 0.6–1.3)
EOSINOPHIL # BLD AUTO: 0.12 THOUSAND/ΜL (ref 0–0.61)
EOSINOPHIL NFR BLD AUTO: 3 % (ref 0–6)
ERYTHROCYTE [DISTWIDTH] IN BLOOD BY AUTOMATED COUNT: 12.2 % (ref 11.6–15.1)
GFR SERPL CREATININE-BSD FRML MDRD: 95 ML/MIN/1.73SQ M
GLUCOSE P FAST SERPL-MCNC: 95 MG/DL (ref 65–99)
HCT VFR BLD AUTO: 39.7 % (ref 34.8–46.1)
HDLC SERPL-MCNC: 64 MG/DL
HGB BLD-MCNC: 12.8 G/DL (ref 11.5–15.4)
IMM GRANULOCYTES # BLD AUTO: 0 THOUSAND/UL (ref 0–0.2)
IMM GRANULOCYTES NFR BLD AUTO: 0 % (ref 0–2)
LDLC SERPL CALC-MCNC: 145 MG/DL (ref 0–100)
LYMPHOCYTES # BLD AUTO: 2.24 THOUSANDS/ΜL (ref 0.6–4.47)
LYMPHOCYTES NFR BLD AUTO: 53 % (ref 14–44)
MCH RBC QN AUTO: 31.2 PG (ref 26.8–34.3)
MCHC RBC AUTO-ENTMCNC: 32.2 G/DL (ref 31.4–37.4)
MCV RBC AUTO: 97 FL (ref 82–98)
MONOCYTES # BLD AUTO: 0.31 THOUSAND/ΜL (ref 0.17–1.22)
MONOCYTES NFR BLD AUTO: 7 % (ref 4–12)
NEUTROPHILS # BLD AUTO: 1.52 THOUSANDS/ΜL (ref 1.85–7.62)
NEUTS SEG NFR BLD AUTO: 36 % (ref 43–75)
NONHDLC SERPL-MCNC: 169 MG/DL
NRBC BLD AUTO-RTO: 0 /100 WBCS
PLATELET # BLD AUTO: 202 THOUSANDS/UL (ref 149–390)
PMV BLD AUTO: 10.1 FL (ref 8.9–12.7)
POTASSIUM SERPL-SCNC: 4.6 MMOL/L (ref 3.5–5.3)
PROT SERPL-MCNC: 6.8 G/DL (ref 6.4–8.2)
RBC # BLD AUTO: 4.1 MILLION/UL (ref 3.81–5.12)
SODIUM SERPL-SCNC: 137 MMOL/L (ref 136–145)
TRIGL SERPL-MCNC: 119 MG/DL
TSH SERPL DL<=0.05 MIU/L-ACNC: 0.43 UIU/ML (ref 0.36–3.74)
WBC # BLD AUTO: 4.21 THOUSAND/UL (ref 4.31–10.16)

## 2020-08-04 PROCEDURE — 80053 COMPREHEN METABOLIC PANEL: CPT

## 2020-08-04 PROCEDURE — 36415 COLL VENOUS BLD VENIPUNCTURE: CPT

## 2020-08-04 PROCEDURE — 80061 LIPID PANEL: CPT

## 2020-08-04 PROCEDURE — 85025 COMPLETE CBC W/AUTO DIFF WBC: CPT

## 2020-08-04 PROCEDURE — 84443 ASSAY THYROID STIM HORMONE: CPT

## 2020-08-12 DIAGNOSIS — F51.01 PRIMARY INSOMNIA: ICD-10-CM

## 2020-08-12 RX ORDER — ALPRAZOLAM 0.5 MG/1
TABLET ORAL
Qty: 90 TABLET | Refills: 0 | Status: SHIPPED | OUTPATIENT
Start: 2020-08-12 | End: 2021-10-06

## 2020-08-13 ENCOUNTER — OFFICE VISIT (OUTPATIENT)
Dept: INTERNAL MEDICINE CLINIC | Facility: CLINIC | Age: 60
End: 2020-08-13
Payer: COMMERCIAL

## 2020-08-13 ENCOUNTER — TELEPHONE (OUTPATIENT)
Dept: ADMINISTRATIVE | Facility: OTHER | Age: 60
End: 2020-08-13

## 2020-08-13 VITALS
TEMPERATURE: 98.1 F | WEIGHT: 158.2 LBS | HEIGHT: 66 IN | BODY MASS INDEX: 25.43 KG/M2 | RESPIRATION RATE: 16 BRPM | OXYGEN SATURATION: 99 % | SYSTOLIC BLOOD PRESSURE: 114 MMHG | HEART RATE: 72 BPM | DIASTOLIC BLOOD PRESSURE: 74 MMHG

## 2020-08-13 DIAGNOSIS — E78.2 MIXED HYPERLIPIDEMIA: ICD-10-CM

## 2020-08-13 DIAGNOSIS — Z11.59 NEED FOR HEPATITIS C SCREENING TEST: ICD-10-CM

## 2020-08-13 DIAGNOSIS — Z00.00 WELLNESS EXAMINATION: Primary | ICD-10-CM

## 2020-08-13 DIAGNOSIS — M25.561 CHRONIC PAIN OF RIGHT KNEE: ICD-10-CM

## 2020-08-13 DIAGNOSIS — E89.0 POSTOPERATIVE HYPOTHYROIDISM: ICD-10-CM

## 2020-08-13 DIAGNOSIS — F51.01 PRIMARY INSOMNIA: ICD-10-CM

## 2020-08-13 DIAGNOSIS — G89.29 CHRONIC PAIN OF RIGHT KNEE: ICD-10-CM

## 2020-08-13 DIAGNOSIS — Z12.31 BREAST CANCER SCREENING BY MAMMOGRAM: ICD-10-CM

## 2020-08-13 PROBLEM — Z20.828 EXPOSURE TO SARS-ASSOCIATED CORONAVIRUS: Status: RESOLVED | Noted: 2020-06-05 | Resolved: 2020-08-13

## 2020-08-13 PROCEDURE — 1036F TOBACCO NON-USER: CPT | Performed by: INTERNAL MEDICINE

## 2020-08-13 PROCEDURE — 3008F BODY MASS INDEX DOCD: CPT | Performed by: ORTHOPAEDIC SURGERY

## 2020-08-13 PROCEDURE — 3725F SCREEN DEPRESSION PERFORMED: CPT | Performed by: INTERNAL MEDICINE

## 2020-08-13 PROCEDURE — 99396 PREV VISIT EST AGE 40-64: CPT | Performed by: INTERNAL MEDICINE

## 2020-08-13 RX ORDER — TRAZODONE HYDROCHLORIDE 50 MG/1
50 TABLET ORAL
Qty: 30 TABLET | Refills: 2 | Status: SHIPPED | OUTPATIENT
Start: 2020-08-13 | End: 2020-08-31 | Stop reason: SDUPTHER

## 2020-08-13 NOTE — PROGRESS NOTES
Assessment/Plan:    Hypothyroidism  Low normal TSH  Continue Synthroid 137mcg    Insomnia  Ambien and Xanax do not help  Start trazodone 50mg at bedtime  I asked that she call if she would like to try a higher dose in the next week    Right knee pain  Bone marrow edema around medial meniscus  This has been going on for >6months and limits her exercise  No relief after PT  F/U with ortho    Mixed hyperlipidemia  BMI Counseling: Body mass index is 25 53 kg/m²  The BMI is above normal  Nutrition recommendations include decreasing overall calorie intake, consuming healthier snacks and reducing intake of cholesterol  Problem List Items Addressed This Visit        Endocrine    Hypothyroidism     Low normal TSH  Continue Synthroid 137mcg         Relevant Orders    CBC and differential    Comprehensive metabolic panel    Lipid Panel with Direct LDL reflex    TSH, 3rd generation with Free T4 reflex       Other    Insomnia     Ambien and Xanax do not help  Start trazodone 50mg at bedtime  I asked that she call if she would like to try a higher dose in the next week         Relevant Medications    traZODone (DESYREL) 50 mg tablet    Right knee pain     Bone marrow edema around medial meniscus  This has been going on for >6months and limits her exercise  No relief after PT  F/U with ortho         Relevant Orders    Ambulatory referral to Orthopedic Surgery    Mixed hyperlipidemia     BMI Counseling: Body mass index is 25 53 kg/m²  The BMI is above normal  Nutrition recommendations include decreasing overall calorie intake, consuming healthier snacks and reducing intake of cholesterol             Relevant Orders    Lipid Panel with Direct LDL reflex      Other Visit Diagnoses     Wellness examination    -  Primary    Need for hepatitis C screening test        Relevant Orders    Hepatitis C antibody    Breast cancer screening by mammogram        Relevant Orders    Mammo screening bilateral w 3d & cad Subjective:      Patient ID: Azalea Alvarado is a 62 y o  female  HPI  Wellness  Up to date with metabolic screening  Up to date with mammogram and pap smear  Colonoscopy in 2016-5 y repeat recommended  Non smoker  Occasional alcohol use      The following portions of the patient's history were reviewed and updated as appropriate: allergies, current medications, past family history, past medical history, past social history, past surgical history and problem list     Review of Systems   Constitutional: Positive for unexpected weight change (weight gain)  Negative for chills and fever  HENT: Negative for congestion, rhinorrhea and sore throat  Respiratory: Negative for cough and shortness of breath  Cardiovascular: Negative for chest pain and palpitations  Gastrointestinal: Negative for abdominal pain, blood in stool, constipation and diarrhea  Genitourinary: Negative for difficulty urinating, hematuria and vaginal bleeding  Musculoskeletal: Positive for arthralgias (right knee)  Neurological: Negative for dizziness and headaches  Psychiatric/Behavioral: Positive for sleep disturbance (sleep initiation and maintenance in spite of Xanax)  Objective:      /74   Pulse 72   Temp 98 1 °F (36 7 °C) (Temporal)   Resp 16   Ht 5' 6" (1 676 m)   Wt 71 8 kg (158 lb 3 2 oz)   SpO2 99%   BMI 25 53 kg/m²          Physical Exam  Constitutional:       Appearance: She is well-developed  HENT:      Head: Normocephalic and atraumatic  Right Ear: External ear normal       Left Ear: External ear normal    Eyes:      Conjunctiva/sclera: Conjunctivae normal    Neck:      Musculoskeletal: Neck supple  Cardiovascular:      Rate and Rhythm: Normal rate and regular rhythm  Heart sounds: Normal heart sounds  No murmur  Pulmonary:      Effort: Pulmonary effort is normal  No respiratory distress  Breath sounds: Normal breath sounds  No wheezing or rales  Abdominal:      General: Bowel sounds are normal  There is no distension  Palpations: Abdomen is soft  There is no mass  Tenderness: There is no abdominal tenderness  There is no guarding or rebound  Musculoskeletal:      Right lower leg: No edema  Left lower leg: No edema  Skin:     General: Skin is warm and dry  Neurological:      Mental Status: She is alert and oriented to person, place, and time  Psychiatric:         Behavior: Behavior normal          Thought Content:  Thought content normal          Judgment: Judgment normal

## 2020-08-13 NOTE — TELEPHONE ENCOUNTER
----- Message from Madyson Santos MD sent at 8/13/2020  9:40 AM EDT -----  Please update colonoscopy in 2016 scanned in  Pap smear last year in [facility]

## 2020-08-13 NOTE — ASSESSMENT & PLAN NOTE
Bone marrow edema around medial meniscus  This has been going on for >6months and limits her exercise  No relief after PT  F/U with ortho

## 2020-08-13 NOTE — TELEPHONE ENCOUNTER
Upon review of the In Basket request we were able to locate, review, and update the patient chart as requested for CRC: Colonoscopy and Pap Smear (HPV) aka Cervical Cancer Screening  Any additional questions or concerns should be emailed to the Practice Liaisons via [email or web address] email, please do not reply via In Basket      Thank you  Usha Avelar MA

## 2020-08-13 NOTE — ASSESSMENT & PLAN NOTE
Ambien and Xanax do not help  Start trazodone 50mg at bedtime  I asked that she call if she would like to try a higher dose in the next week

## 2020-08-13 NOTE — ASSESSMENT & PLAN NOTE
BMI Counseling: Body mass index is 25 53 kg/m²  The BMI is above normal  Nutrition recommendations include decreasing overall calorie intake, consuming healthier snacks and reducing intake of cholesterol

## 2020-08-14 ENCOUNTER — CLINICAL SUPPORT (OUTPATIENT)
Dept: INTERNAL MEDICINE CLINIC | Facility: CLINIC | Age: 60
End: 2020-08-14
Payer: COMMERCIAL

## 2020-08-14 DIAGNOSIS — Z23 NEED FOR VACCINATION: Primary | ICD-10-CM

## 2020-08-14 PROCEDURE — 90471 IMMUNIZATION ADMIN: CPT

## 2020-08-14 PROCEDURE — 90750 HZV VACC RECOMBINANT IM: CPT

## 2020-08-14 NOTE — PROGRESS NOTES
The patient arrived for her first Shingrix injection  The patient does not think that sh had Chicken pox  I rvwd with Dr Clemente Hernandez and she stated the patient can have the vaccine as it is not a live virus  The patient was given the Shingrix injection into right upper arm      The patients temperature was 97 3

## 2020-08-17 ENCOUNTER — TELEPHONE (OUTPATIENT)
Dept: INTERNAL MEDICINE CLINIC | Facility: CLINIC | Age: 60
End: 2020-08-17

## 2020-08-17 NOTE — TELEPHONE ENCOUNTER
It looks like she has a 50 mg tablets, so it would be fine to take two of the tablets to equal 100 mg

## 2020-08-17 NOTE — TELEPHONE ENCOUNTER
Advised pt, wants to know if she should wait 2 weeks before increasing the dose?, Does this drug take a while to have the full effect as per the literature she read that came with the medication it says it can take 1-2 weeks and she does not want to increase if the 50mg will work it just takes time    please advise, ty

## 2020-08-17 NOTE — TELEPHONE ENCOUNTER
Usually people start getting benefit at 1-2 weeks  Normal dose is  mg  Since she stated the 50 mg is "not working" after being on it 5 days, she could go up to the 100 mg, see if it helps, then she could always try titrating back to the 50 mg in the future to see if she still gets benefit  Since she has only been on the 50 mg for 5 days, she could give it a little more time to work  On the other hand, If she is feeling inpatient, she can increase to 100 mg now since that is not too big of a dose, and always try decreasing back to 50 mg in the future

## 2020-08-17 NOTE — TELEPHONE ENCOUNTER
Pt calling in regarding trazodone    says the medication is not working and Dr Jose Hill discussed with her if that happens she can take 2, it's only been since 8/13 so she is asking if it is okay to start taking 2 tablets already?  Please advise, ty

## 2020-08-28 ENCOUNTER — OFFICE VISIT (OUTPATIENT)
Dept: OBGYN CLINIC | Facility: MEDICAL CENTER | Age: 60
End: 2020-08-28
Payer: COMMERCIAL

## 2020-08-28 VITALS — HEART RATE: 80 BPM | SYSTOLIC BLOOD PRESSURE: 112 MMHG | DIASTOLIC BLOOD PRESSURE: 71 MMHG

## 2020-08-28 DIAGNOSIS — M25.561 CHRONIC PAIN OF RIGHT KNEE: ICD-10-CM

## 2020-08-28 DIAGNOSIS — M17.11 PRIMARY OSTEOARTHRITIS OF RIGHT KNEE: Primary | ICD-10-CM

## 2020-08-28 DIAGNOSIS — G89.29 CHRONIC PAIN OF RIGHT KNEE: ICD-10-CM

## 2020-08-28 PROCEDURE — 20610 DRAIN/INJ JOINT/BURSA W/O US: CPT | Performed by: ORTHOPAEDIC SURGERY

## 2020-08-28 PROCEDURE — 99213 OFFICE O/P EST LOW 20 MIN: CPT | Performed by: ORTHOPAEDIC SURGERY

## 2020-08-28 PROCEDURE — 1036F TOBACCO NON-USER: CPT | Performed by: ORTHOPAEDIC SURGERY

## 2020-08-28 RX ORDER — BUPIVACAINE HYDROCHLORIDE 2.5 MG/ML
2 INJECTION, SOLUTION INFILTRATION; PERINEURAL
Status: COMPLETED | OUTPATIENT
Start: 2020-08-28 | End: 2020-08-28

## 2020-08-28 RX ORDER — BETAMETHASONE SODIUM PHOSPHATE AND BETAMETHASONE ACETATE 3; 3 MG/ML; MG/ML
12 INJECTION, SUSPENSION INTRA-ARTICULAR; INTRALESIONAL; INTRAMUSCULAR; SOFT TISSUE
Status: COMPLETED | OUTPATIENT
Start: 2020-08-28 | End: 2020-08-28

## 2020-08-28 RX ORDER — LIDOCAINE HYDROCHLORIDE 10 MG/ML
2 INJECTION, SOLUTION INFILTRATION; PERINEURAL
Status: COMPLETED | OUTPATIENT
Start: 2020-08-28 | End: 2020-08-28

## 2020-08-28 RX ADMIN — BUPIVACAINE HYDROCHLORIDE 2 ML: 2.5 INJECTION, SOLUTION INFILTRATION; PERINEURAL at 10:11

## 2020-08-28 RX ADMIN — BETAMETHASONE SODIUM PHOSPHATE AND BETAMETHASONE ACETATE 12 MG: 3; 3 INJECTION, SUSPENSION INTRA-ARTICULAR; INTRALESIONAL; INTRAMUSCULAR; SOFT TISSUE at 10:11

## 2020-08-28 RX ADMIN — LIDOCAINE HYDROCHLORIDE 2 ML: 10 INJECTION, SOLUTION INFILTRATION; PERINEURAL at 10:11

## 2020-08-28 NOTE — PROGRESS NOTES
Assessment:  1  Primary osteoarthritis of right knee     2  Chronic pain of right knee  Ambulatory referral to Orthopedic Surgery       Plan:  The patient is explained the under surface of the patella show decrease cartilage  She is encouraged to continue right quadriceps strengthening exercises  The patient was provided with right knee steroid injection  The patient tolerated the procedure well  The patient can follow up as needed and is welcome to return at any point with any new or old issue  To do next visit:  Return if symptoms worsen or fail to improve  The above stated was discussed in layman's terms and the patient expressed understanding  All questions were answered to the patient's satisfaction  Scribe Attestation    I,:   Wili Montemayor am acting as a scribe while in the presence of the attending physician :        I,:   Robbie Blanco MD personally performed the services described in this documentation    as scribed in my presence :              Subjective:   Warner Snellen is a 62 y o  female who presents for initial evaluation of bilateral knees  She has had symptoms for about one year with no injury  She attributes her pain to increased walking  Today she complains of right lateral and generalized knee pain and let generalized knee pain  She rates her right knee pain at 0-7/10 and left knee at 5/10  The knee can feel unstable at time and click on occasion  Prolonged walking and walking downhill aggravates  She will use IBU 800mg with some benefit  She did physical therapy 2x for over 6 weeks  She has had left knee steroid injections about 5 years ago with benefit  She denies past knee surgery          Review of systems negative unless otherwise specified in HPI    Past Medical History:   Diagnosis Date    Dyslipidemia     Last assessed: 7/30/15    Exposure to SARS-associated coronavirus 6/5/2020       Past Surgical History:   Procedure Laterality Date    ARTHROPLASTY Left 2008    with Prosthesis Trapezium  Thumb w/LRTI  Dr Jf Mandel   Last assessed: 8/1/16    TONSILLECTOMY      Last assessed: 8/1/16       Family History   Problem Relation Age of Onset    Alzheimer's disease Mother     Hyperlipidemia Mother     Colon cancer Father        Social History     Occupational History    Not on file   Tobacco Use    Smoking status: Never Smoker    Smokeless tobacco: Never Used   Substance and Sexual Activity    Alcohol use: Yes     Comment: One beverage daily 7/2015    Drug use: No    Sexual activity: Not on file         Current Outpatient Medications:     ALPRAZolam (XANAX) 0 5 mg tablet, TAKE 1 TO 2 TABLETS BY MOUTH AT NIGHT AS NEEDED FOR INSOMNIA, Disp: 90 tablet, Rfl: 0    Beclomethasone Dipropionate 80 MCG/ACT AERS, 2 Act (160 mcg total) into each nostril daily, Disp: 10 6 g, Rfl: 1    clotrimazole-betamethasone (LOTRISONE) 1-0 05 % cream, Apply topically 2 (two) times a day, Disp: , Rfl:     estradiol (ESTRACE) 0 5 MG tablet, Take by mouth, Disp: , Rfl:     norethindrone (AYGESTIN) 5 mg tablet, Take 5 mg by mouth daily, Disp: , Rfl:     SYNTHROID 137 MCG tablet, TAKE 1 TABLET BY MOUTH EVERY DAY ON EMPTY STOMACH, Disp: 30 tablet, Rfl: 11    traZODone (DESYREL) 50 mg tablet, Take 1 tablet (50 mg total) by mouth daily at bedtime, Disp: 30 tablet, Rfl: 2    Allergies   Allergen Reactions    Pollen Extract             Vitals:    08/28/20 0934   BP: 112/71   Pulse: 80       Objective:  Physical exam  · General: Awake, Alert, Oriented  · Eyes: Pupils equal, round and reactive to light  · Heart: regular rate and rhythm  · Lungs: No audible wheezing  · Abdomen: soft                    Ortho Exam   Bilateral knees:  Mild varus alignment  Lateral > medial joint line tenderness of right knee  TTP lateral joint line of left knee  Mild right quadriceps atrophy  Extensor mechanism intact  Negative anterior/posterior drawer  Stable to varus and valgus stress at 0 and 30 degrees  No erythema or ecchymosis  No effusion or swelling  Normal strength  Good ROM   Calf compartments soft and supple  Sensation intact  Toes are warm sensate and mobile        Diagnostics, reviewed and taken today if performed as documented: The attending physician has personally reviewed the pertinent films in PACS and interpretation is as follows:  Right knee MRI:  No evidence of meniscal tear  Mild/moderate patellofemoral degenerative changes  Procedures, if performed today:    Large joint arthrocentesis: R knee  Date/Time: 8/28/2020 10:11 AM  Consent given by: patient  Site marked: site marked  Supporting Documentation  Indications: pain   Procedure Details  Location: knee - R knee  Preparation: Patient was prepped and draped in the usual sterile fashion  Needle size: 22 G  Ultrasound guidance: no  Approach: anterolateral  Medications administered: 12 mg betamethasone acetate-betamethasone sodium phosphate 6 (3-3) mg/mL; 2 mL bupivacaine 0 25 %; 2 mL lidocaine 1 %    Patient tolerance: patient tolerated the procedure well with no immediate complications  Dressing:  Sterile dressing applied              Portions of the record may have been created with voice recognition software  Occasional wrong word or "sound a like" substitutions may have occurred due to the inherent limitations of voice recognition software  Read the chart carefully and recognize, using context, where substitutions have occurred

## 2020-08-31 DIAGNOSIS — F51.01 PRIMARY INSOMNIA: ICD-10-CM

## 2020-09-01 RX ORDER — TRAZODONE HYDROCHLORIDE 50 MG/1
50 TABLET ORAL
Qty: 30 TABLET | Refills: 11 | Status: SHIPPED | OUTPATIENT
Start: 2020-09-01 | End: 2021-08-16 | Stop reason: SDUPTHER

## 2020-09-08 ENCOUNTER — TELEPHONE (OUTPATIENT)
Dept: INTERNAL MEDICINE CLINIC | Facility: CLINIC | Age: 60
End: 2020-09-08

## 2020-09-08 DIAGNOSIS — L23.7 POISON IVY DERMATITIS: Primary | ICD-10-CM

## 2020-09-08 RX ORDER — PREDNISONE 10 MG/1
TABLET ORAL
Qty: 21 TABLET | Refills: 0 | Status: SHIPPED | OUTPATIENT
Start: 2020-09-08 | End: 2020-10-09 | Stop reason: ALTCHOICE

## 2020-09-08 NOTE — TELEPHONE ENCOUNTER
Pt calling in stating she is breaking out with poison ivy since Saturday, it is on her face, neck, and a spot on left arm  Fatou Veliz asking for something to be called in to [facility] please

## 2020-09-23 DIAGNOSIS — E03.9 HYPOTHYROIDISM, UNSPECIFIED TYPE: ICD-10-CM

## 2020-09-24 RX ORDER — LEVOTHYROXINE SODIUM 137 MCG
137 TABLET ORAL DAILY
Qty: 90 TABLET | Refills: 3 | Status: SHIPPED | OUTPATIENT
Start: 2020-09-24 | End: 2021-09-20

## 2020-10-09 ENCOUNTER — TELEMEDICINE (OUTPATIENT)
Dept: INTERNAL MEDICINE CLINIC | Facility: CLINIC | Age: 60
End: 2020-10-09
Payer: COMMERCIAL

## 2020-10-09 DIAGNOSIS — J02.9 PHARYNGITIS, UNSPECIFIED ETIOLOGY: Primary | ICD-10-CM

## 2020-10-09 PROCEDURE — 99213 OFFICE O/P EST LOW 20 MIN: CPT | Performed by: INTERNAL MEDICINE

## 2020-10-09 RX ORDER — AMOXICILLIN 500 MG/1
500 CAPSULE ORAL EVERY 8 HOURS SCHEDULED
Qty: 21 CAPSULE | Refills: 0 | Status: SHIPPED | OUTPATIENT
Start: 2020-10-09 | End: 2020-10-16

## 2020-10-21 ENCOUNTER — CLINICAL SUPPORT (OUTPATIENT)
Dept: INTERNAL MEDICINE CLINIC | Facility: CLINIC | Age: 60
End: 2020-10-21
Payer: COMMERCIAL

## 2020-10-21 DIAGNOSIS — Z23 NEED FOR VACCINATION: Primary | ICD-10-CM

## 2020-10-21 PROCEDURE — 90471 IMMUNIZATION ADMIN: CPT

## 2020-10-21 PROCEDURE — 90750 HZV VACC RECOMBINANT IM: CPT

## 2020-12-04 DIAGNOSIS — Z12.31 BREAST CANCER SCREENING BY MAMMOGRAM: ICD-10-CM

## 2021-03-11 DIAGNOSIS — J30.89 SEASONAL ALLERGIC RHINITIS DUE TO OTHER ALLERGIC TRIGGER: ICD-10-CM

## 2021-04-09 DIAGNOSIS — Z23 ENCOUNTER FOR IMMUNIZATION: ICD-10-CM

## 2021-04-15 DIAGNOSIS — Z91.89 AT INCREASED RISK OF EXPOSURE TO COVID-19 VIRUS: ICD-10-CM

## 2021-04-15 DIAGNOSIS — R51.9 ACUTE INTRACTABLE HEADACHE, UNSPECIFIED HEADACHE TYPE: Primary | ICD-10-CM

## 2021-04-15 PROCEDURE — U0003 INFECTIOUS AGENT DETECTION BY NUCLEIC ACID (DNA OR RNA); SEVERE ACUTE RESPIRATORY SYNDROME CORONAVIRUS 2 (SARS-COV-2) (CORONAVIRUS DISEASE [COVID-19]), AMPLIFIED PROBE TECHNIQUE, MAKING USE OF HIGH THROUGHPUT TECHNOLOGIES AS DESCRIBED BY CMS-2020-01-R: HCPCS | Performed by: INTERNAL MEDICINE

## 2021-04-15 PROCEDURE — U0005 INFEC AGEN DETEC AMPLI PROBE: HCPCS | Performed by: INTERNAL MEDICINE

## 2021-04-16 ENCOUNTER — TELEMEDICINE (OUTPATIENT)
Dept: INTERNAL MEDICINE CLINIC | Facility: CLINIC | Age: 61
End: 2021-04-16
Payer: COMMERCIAL

## 2021-04-16 DIAGNOSIS — Z20.822 EXPOSURE TO COVID-19 VIRUS: Primary | ICD-10-CM

## 2021-04-16 LAB — SARS-COV-2 RNA RESP QL NAA+PROBE: NEGATIVE

## 2021-04-16 PROCEDURE — 1036F TOBACCO NON-USER: CPT | Performed by: INTERNAL MEDICINE

## 2021-04-16 PROCEDURE — 99214 OFFICE O/P EST MOD 30 MIN: CPT | Performed by: INTERNAL MEDICINE

## 2021-04-16 NOTE — PROGRESS NOTES
COVID-19 Outpatient Progress Note    Assessment/Plan:    Problem List Items Addressed This Visit     None      Visit Diagnoses     Exposure to COVID-19 virus    -  Primary         Disposition:     I recommended continued isolation until at least 24 hours have passed since recovery defined as resolution of fever without the use of fever-reducing medications AND improvement in COVID symptoms AND 10 days have passed since onset of symptoms (or 10 days have passed since date of first positive viral diagnostic test for asymptomatic patients)  I have spent 5 minutes directly with the patient  Greater than 50% of this time was spent in counseling/coordination of care regarding: impressions  Encounter provider Oral Villareal MD    Provider located at [address] [place] [id number]    Recent Visits  No visits were found meeting these conditions  Showing recent visits within past 7 days and meeting all other requirements     Today's Visits  Date Type Provider Dept   04/16/21 Telemedicine Oral Villareal MD [address] [facility] today's visits and meeting all other requirements     Future Appointments  No visits were found meeting these conditions  Showing future appointments within next 150 days and meeting all other requirements      This virtual check-in was done via [employer] and patient was informed that this is a secure, HIPAA-compliant platform  She agrees to proceed  Patient agrees to participate in a virtual check in via telephone or video visit instead of presenting to the office to address urgent/immediate medical needs  Patient is aware this is a billable service  After connecting through [facility], the patient was identified by name and date of birth  Anabell Gilbertu was informed that this was a telemedicine visit and that the exam was being conducted confidentially over secure lines  My office door was closed   No one else was in the room  Tanya Ceja acknowledged consent and understanding of privacy and security of the telemedicine visit  I informed the patient that I have reviewed her record in Epic and presented the opportunity for her to ask any questions regarding the visit today  The patient agreed to participate  Subjective:   Tanya Ceja is a 62 y o  female who has been screened for COVID-19  Symptom change since last report: unchanged  Patient's symptoms include fatigue, nasal congestion, rhinorrhea and headache  Patient denies fever, chills, sore throat, anosmia, loss of taste, cough, shortness of breath, chest tightness, abdominal pain, nausea, vomiting, diarrhea and myalgias  [facility] has been staying home and has isolated themselves in her home  She is taking care to not share personal items and is cleaning all surfaces that are touched often, like counters, tabletops, and doorknobs using household cleaning sprays or wipes  She is wearing a mask when she leaves her room  Date of symptom onset: 4/14/2021    Fully vaccinated  Exposed at work and now with nonspecific symptoms  Sent for test yesterday and result still in process    Lab Results   Component Value Date    SARSCOV2 Not Detected 06/05/2020     Past Medical History:   Diagnosis Date    Dyslipidemia     Last assessed: 7/30/15    Exposure to SARS-associated coronavirus 6/5/2020     Past Surgical History:   Procedure Laterality Date    ARTHROPLASTY Left 2008    with Prosthesis Trapezium  Thumb w/LRTI  Dr Garlan Pallas   Last assessed: 8/1/16    TONSILLECTOMY      Last assessed: 8/1/16     Current Outpatient Medications   Medication Sig Dispense Refill    ALPRAZolam (XANAX) 0 5 mg tablet TAKE 1 TO 2 TABLETS BY MOUTH AT NIGHT AS NEEDED FOR INSOMNIA 90 tablet 0    Beclomethasone Dipropionate 80 MCG/ACT AERS 2 Act (160 mcg total) into each nostril daily 10 6 g 1    clotrimazole-betamethasone (LOTRISONE) 1-0 05 % cream Apply topically 2 (two) times a day  estradiol (ESTRACE) 0 5 MG tablet Take by mouth      norethindrone (AYGESTIN) 5 mg tablet Take 5 mg by mouth daily      Synthroid 137 MCG tablet Take 1 tablet (137 mcg total) by mouth daily On empty stomach 90 tablet 3    traZODone (DESYREL) 50 mg tablet Take 1 tablet (50 mg total) by mouth daily at bedtime 30 tablet 11     No current facility-administered medications for this visit  Allergies   Allergen Reactions    Pollen Extract        Review of Systems   Constitutional: Positive for fatigue  Negative for chills and fever  HENT: Positive for congestion and rhinorrhea  Negative for sore throat  Respiratory: Negative for cough, chest tightness and shortness of breath  Gastrointestinal: Negative for abdominal pain, diarrhea, nausea and vomiting  Musculoskeletal: Negative for myalgias  Neurological: Positive for headaches  Objective: There were no vitals filed for this visit  Physical Exam  Constitutional:       General: She is not in acute distress  Appearance: She is not ill-appearing, toxic-appearing or diaphoretic  Pulmonary:      Effort: No respiratory distress  Neurological:      Mental Status: She is oriented to person, place, and time  Psychiatric:         Mood and Affect: Mood normal          Behavior: Behavior normal          Thought Content: Thought content normal        VIRTUAL VISIT DISCLAIMER    Francisca Schultzmagdiel acknowledges that she has consented to an online visit or consultation  She understands that the online visit is based solely on information provided by her, and that, in the absence of a face-to-face physical evaluation by the physician, the diagnosis she receives is both limited and provisional in terms of accuracy and completeness  This is not intended to replace a full medical face-to-face evaluation by the physician  Francisca Meeks understands and accepts these terms

## 2021-05-27 DIAGNOSIS — J30.89 SEASONAL ALLERGIC RHINITIS DUE TO OTHER ALLERGIC TRIGGER: ICD-10-CM

## 2021-06-01 ENCOUNTER — LAB REQUISITION (OUTPATIENT)
Dept: LAB | Facility: HOSPITAL | Age: 61
End: 2021-06-01
Payer: COMMERCIAL

## 2021-06-01 DIAGNOSIS — Z80.0 FAMILY HISTORY OF MALIGNANT NEOPLASM OF DIGESTIVE ORGANS: ICD-10-CM

## 2021-06-01 DIAGNOSIS — K63.5 POLYP OF COLON: ICD-10-CM

## 2021-06-01 DIAGNOSIS — K57.30 DIVERTICULOSIS OF LARGE INTESTINE WITHOUT PERFORATION OR ABSCESS WITHOUT BLEEDING: ICD-10-CM

## 2021-06-01 PROCEDURE — 88305 TISSUE EXAM BY PATHOLOGIST: CPT | Performed by: PATHOLOGY

## 2021-07-21 ENCOUNTER — TELEPHONE (OUTPATIENT)
Dept: INTERNAL MEDICINE CLINIC | Facility: CLINIC | Age: 61
End: 2021-07-21

## 2021-07-21 NOTE — TELEPHONE ENCOUNTER
Patient has come in contact with poison ivy  You typically send a prescription for her  Please [place] [facility] [place]

## 2021-08-13 ENCOUNTER — APPOINTMENT (OUTPATIENT)
Dept: LAB | Age: 61
End: 2021-08-13
Payer: COMMERCIAL

## 2021-08-13 DIAGNOSIS — Z11.59 NEED FOR HEPATITIS C SCREENING TEST: ICD-10-CM

## 2021-08-13 DIAGNOSIS — E78.2 MIXED HYPERLIPIDEMIA: ICD-10-CM

## 2021-08-13 DIAGNOSIS — E89.0 POSTOPERATIVE HYPOTHYROIDISM: ICD-10-CM

## 2021-08-13 LAB
ALBUMIN SERPL BCP-MCNC: 3.6 G/DL (ref 3.5–5)
ALP SERPL-CCNC: 57 U/L (ref 46–116)
ALT SERPL W P-5'-P-CCNC: 22 U/L (ref 12–78)
ANION GAP SERPL CALCULATED.3IONS-SCNC: 2 MMOL/L (ref 4–13)
AST SERPL W P-5'-P-CCNC: 15 U/L (ref 5–45)
BASOPHILS # BLD AUTO: 0.02 THOUSANDS/ΜL (ref 0–0.1)
BASOPHILS NFR BLD AUTO: 1 % (ref 0–1)
BILIRUB SERPL-MCNC: 0.48 MG/DL (ref 0.2–1)
BUN SERPL-MCNC: 19 MG/DL (ref 5–25)
CALCIUM SERPL-MCNC: 8.5 MG/DL (ref 8.3–10.1)
CHLORIDE SERPL-SCNC: 108 MMOL/L (ref 100–108)
CHOLEST SERPL-MCNC: 198 MG/DL (ref 50–200)
CO2 SERPL-SCNC: 28 MMOL/L (ref 21–32)
CREAT SERPL-MCNC: 0.64 MG/DL (ref 0.6–1.3)
EOSINOPHIL # BLD AUTO: 0.12 THOUSAND/ΜL (ref 0–0.61)
EOSINOPHIL NFR BLD AUTO: 4 % (ref 0–6)
ERYTHROCYTE [DISTWIDTH] IN BLOOD BY AUTOMATED COUNT: 12.2 % (ref 11.6–15.1)
GFR SERPL CREATININE-BSD FRML MDRD: 97 ML/MIN/1.73SQ M
GLUCOSE P FAST SERPL-MCNC: 89 MG/DL (ref 65–99)
HCT VFR BLD AUTO: 38.2 % (ref 34.8–46.1)
HCV AB SER QL: NORMAL
HDLC SERPL-MCNC: 69 MG/DL
HGB BLD-MCNC: 12.6 G/DL (ref 11.5–15.4)
IMM GRANULOCYTES # BLD AUTO: 0.01 THOUSAND/UL (ref 0–0.2)
IMM GRANULOCYTES NFR BLD AUTO: 0 % (ref 0–2)
LDLC SERPL CALC-MCNC: 116 MG/DL (ref 0–100)
LYMPHOCYTES # BLD AUTO: 1.49 THOUSANDS/ΜL (ref 0.6–4.47)
LYMPHOCYTES NFR BLD AUTO: 48 % (ref 14–44)
MCH RBC QN AUTO: 31.8 PG (ref 26.8–34.3)
MCHC RBC AUTO-ENTMCNC: 33 G/DL (ref 31.4–37.4)
MCV RBC AUTO: 97 FL (ref 82–98)
MONOCYTES # BLD AUTO: 0.36 THOUSAND/ΜL (ref 0.17–1.22)
MONOCYTES NFR BLD AUTO: 12 % (ref 4–12)
NEUTROPHILS # BLD AUTO: 1.06 THOUSANDS/ΜL (ref 1.85–7.62)
NEUTS SEG NFR BLD AUTO: 35 % (ref 43–75)
NRBC BLD AUTO-RTO: 0 /100 WBCS
PLATELET # BLD AUTO: 198 THOUSANDS/UL (ref 149–390)
PMV BLD AUTO: 10 FL (ref 8.9–12.7)
POTASSIUM SERPL-SCNC: 4.3 MMOL/L (ref 3.5–5.3)
PROT SERPL-MCNC: 6.7 G/DL (ref 6.4–8.2)
RBC # BLD AUTO: 3.96 MILLION/UL (ref 3.81–5.12)
SODIUM SERPL-SCNC: 138 MMOL/L (ref 136–145)
TRIGL SERPL-MCNC: 66 MG/DL
TSH SERPL DL<=0.05 MIU/L-ACNC: 0.59 UIU/ML (ref 0.36–3.74)
WBC # BLD AUTO: 3.06 THOUSAND/UL (ref 4.31–10.16)

## 2021-08-13 PROCEDURE — 36415 COLL VENOUS BLD VENIPUNCTURE: CPT

## 2021-08-13 PROCEDURE — 85025 COMPLETE CBC W/AUTO DIFF WBC: CPT

## 2021-08-13 PROCEDURE — 86803 HEPATITIS C AB TEST: CPT

## 2021-08-13 PROCEDURE — 84443 ASSAY THYROID STIM HORMONE: CPT

## 2021-08-13 PROCEDURE — 80053 COMPREHEN METABOLIC PANEL: CPT

## 2021-08-13 PROCEDURE — 80061 LIPID PANEL: CPT

## 2021-08-16 ENCOUNTER — OFFICE VISIT (OUTPATIENT)
Dept: INTERNAL MEDICINE CLINIC | Facility: CLINIC | Age: 61
End: 2021-08-16
Payer: COMMERCIAL

## 2021-08-16 VITALS
OXYGEN SATURATION: 98 % | WEIGHT: 155.4 LBS | BODY MASS INDEX: 24.98 KG/M2 | DIASTOLIC BLOOD PRESSURE: 64 MMHG | SYSTOLIC BLOOD PRESSURE: 104 MMHG | HEART RATE: 82 BPM | HEIGHT: 66 IN

## 2021-08-16 DIAGNOSIS — R21 RASH: ICD-10-CM

## 2021-08-16 DIAGNOSIS — Z12.4 SCREENING FOR CERVICAL CANCER: ICD-10-CM

## 2021-08-16 DIAGNOSIS — Z00.00 ANNUAL PHYSICAL EXAM: Primary | ICD-10-CM

## 2021-08-16 DIAGNOSIS — D72.9 ABNORMAL WBC COUNT: ICD-10-CM

## 2021-08-16 DIAGNOSIS — F51.01 PRIMARY INSOMNIA: ICD-10-CM

## 2021-08-16 PROCEDURE — 3725F SCREEN DEPRESSION PERFORMED: CPT | Performed by: NURSE PRACTITIONER

## 2021-08-16 PROCEDURE — 1036F TOBACCO NON-USER: CPT | Performed by: NURSE PRACTITIONER

## 2021-08-16 PROCEDURE — 99396 PREV VISIT EST AGE 40-64: CPT | Performed by: NURSE PRACTITIONER

## 2021-08-16 PROCEDURE — 3008F BODY MASS INDEX DOCD: CPT | Performed by: NURSE PRACTITIONER

## 2021-08-16 RX ORDER — CLOTRIMAZOLE AND BETAMETHASONE DIPROPIONATE 10; .64 MG/G; MG/G
CREAM TOPICAL 2 TIMES DAILY
Qty: 90 G | Refills: 1 | Status: SHIPPED | OUTPATIENT
Start: 2021-08-16

## 2021-08-16 RX ORDER — TRAZODONE HYDROCHLORIDE 50 MG/1
TABLET ORAL
Qty: 60 TABLET | Refills: 2 | Status: SHIPPED | OUTPATIENT
Start: 2021-08-16 | End: 2022-02-06

## 2021-08-16 NOTE — PATIENT INSTRUCTIONS

## 2021-08-16 NOTE — PROGRESS NOTES
[facility]    NAME: Akosua Arrieta  AGE: 62 y o  SEX: female  : 1960     DATE: 2021     Assessment and Plan:     Problem List Items Addressed This Visit        Other    Insomnia    Relevant Medications    traZODone (DESYREL) 50 mg tablet    Abnormal WBC count     Repeat cbc and cmp in 3 months         Relevant Orders    CBC and differential    Comprehensive metabolic panel      Other Visit Diagnoses     Annual physical exam    -  Primary    Screening for cervical cancer        Relevant Orders    Ambulatory referral to Obstetrics / Gynecology    Rash        Relevant Medications    clotrimazole-betamethasone (LOTRISONE) 1-0 05 % cream          Immunizations and preventive care screenings were discussed with patient today  Appropriate education was printed on patient's after visit summary  Counseling:  · Exercise: the importance of regular exercise/physical activity was discussed  Recommend exercise 3-5 times per week for at least 30 minutes  Return in about 1 year (around 2022) for Annual physical      Chief Complaint:     Chief Complaint   Patient presents with    Physical Exam      History of Present Illness:     Adult Annual Physical   Patient here for a comprehensive physical exam  The patient reports no problems  Needs refill of trazodone  Co rash  Review blood work- low wbc and low anion gap    Diet and Physical Activity  · Diet/Nutrition: well balanced diet  · Exercise: walking  Depression Screening  PHQ-9 Depression Screening    PHQ-9:   Frequency of the following problems over the past two weeks:      Little interest or pleasure in doing things: 0 - not at all  Feeling down, depressed, or hopeless: 0 - not at all  PHQ-2 Score: 0       General Health  · Sleep: sleeps well  · Hearing: normal - bilateral   · Vision: no vision problems  · Dental: regular dental visits         /GYN Health  · Patient is: postmenopausal  ·      Review of Systems:     Review of Systems   Constitutional: Negative  HENT: Negative  Eyes: Negative  Respiratory: Negative  Cardiovascular: Negative  Gastrointestinal: Negative  Musculoskeletal: Negative  Neurological: Negative  Past Medical History:     Past Medical History:   Diagnosis Date    Dyslipidemia     Last assessed: 7/30/15    Exposure to SARS-associated coronavirus 6/5/2020      Past Surgical History:     Past Surgical History:   Procedure Laterality Date    ARTHROPLASTY Left 2008    with Prosthesis Trapezium  Thumb w/LRTI  Dr Dav Turcios  Last assessed: 8/1/16    TONSILLECTOMY      Last assessed: 8/1/16      Social History:     Social History     Socioeconomic History    Marital status: /Civil Union     Spouse name: None    Number of children: 2    Years of education: None    Highest education level: None   Occupational History    None   Tobacco Use    Smoking status: Never Smoker    Smokeless tobacco: Never Used   Vaping Use    Vaping Use: Never used   Substance and Sexual Activity    Alcohol use: Yes     Comment: One beverage daily 7/2015    Drug use: No    Sexual activity: Yes     Partners: Male   Other Topics Concern    None   Social History Narrative    Exercises daily     Social Determinants of Health     Financial Resource Strain:     Difficulty of Paying Living Expenses:    Food Insecurity:     Worried About Running Out of Food in the Last Year:     Ran Out of Food in the Last Year:    Transportation Needs:     Lack of Transportation (Medical):      Lack of Transportation (Non-Medical):    Physical Activity:     Days of Exercise per Week:     Minutes of Exercise per Session:    Stress:     Feeling of Stress :    Social Connections:     Frequency of Communication with Friends and Family:     Frequency of Social Gatherings with Friends and Family:     Attends Christian Services:     Active Member of Clubs or Organizations:     Attends Club or Organization Meetings:     Marital Status:    Intimate Partner Violence:     Fear of Current or Ex-Partner:     Emotionally Abused:     Physically Abused:     Sexually Abused:       Family History:     Family History   Problem Relation Age of Onset    Alzheimer's disease Mother     Hyperlipidemia Mother     Colon cancer Father       Current Medications:     Current Outpatient Medications   Medication Sig Dispense Refill    ALPRAZolam (XANAX) 0 5 mg tablet TAKE 1 TO 2 TABLETS BY MOUTH AT NIGHT AS NEEDED FOR INSOMNIA 90 tablet 0    Beclomethasone Dipropionate 80 MCG/ACT AERS 2 Act (160 mcg total) into each nostril daily 10 6 g 1    clotrimazole-betamethasone (LOTRISONE) 1-0 05 % cream Apply topically 2 (two) times a day 90 g 1    estradiol (ESTRACE) 0 5 MG tablet Take by mouth      norethindrone (AYGESTIN) 5 mg tablet Take 5 mg by mouth daily      Synthroid 137 MCG tablet Take 1 tablet (137 mcg total) by mouth daily On empty stomach 90 tablet 3    traZODone (DESYREL) 50 mg tablet Take 1-2 tablets at night for insomnia 60 tablet 2    predniSONE 10 mg tablet 4 tabs PO daily for 3 days then 3 daily for 2 days then 2 daily for 1 day then 1 daily for 1 day (Patient not taking: Reported on 8/16/2021) 21 tablet 0     No current facility-administered medications for this visit  Allergies: Allergies   Allergen Reactions    Pollen Extract       Physical Exam:     /64   Pulse 82   Ht 5' 6 2" (1 681 m)   Wt 70 5 kg (155 lb 6 4 oz)   SpO2 98%   BMI 24 93 kg/m²     Physical Exam  Vitals reviewed  Constitutional:       Appearance: Normal appearance  HENT:      Head: Normocephalic        Right Ear: Tympanic membrane, ear canal and external ear normal       Left Ear: Tympanic membrane, ear canal and external ear normal       Nose: Nose normal       Mouth/Throat:      Mouth: Mucous membranes are moist    Eyes:      Conjunctiva/sclera: Conjunctivae normal       Pupils: Pupils are equal, round, and reactive to light  Cardiovascular:      Rate and Rhythm: Normal rate and regular rhythm  Pulses: Normal pulses  Heart sounds: Normal heart sounds  Pulmonary:      Effort: Pulmonary effort is normal       Breath sounds: Normal breath sounds  Abdominal:      General: Abdomen is flat  Bowel sounds are normal       Palpations: Abdomen is soft  Musculoskeletal:         General: Normal range of motion  Cervical back: Normal range of motion and neck supple  Skin:     General: Skin is warm and dry  Neurological:      General: No focal deficit present  Mental Status: She is alert and oriented to person, place, and time     Psychiatric:         Mood and Affect: Mood normal          Behavior: Behavior normal           Alissa Riddles, [facility]

## 2021-08-18 PROBLEM — D72.9 ABNORMAL WBC COUNT: Status: ACTIVE | Noted: 2021-08-18

## 2021-09-19 DIAGNOSIS — E03.9 HYPOTHYROIDISM, UNSPECIFIED TYPE: ICD-10-CM

## 2021-09-20 RX ORDER — LEVOTHYROXINE SODIUM 137 MCG
TABLET ORAL
Qty: 90 TABLET | Refills: 3 | Status: SHIPPED | OUTPATIENT
Start: 2021-09-20

## 2021-10-06 DIAGNOSIS — F51.01 PRIMARY INSOMNIA: ICD-10-CM

## 2021-10-06 RX ORDER — ALPRAZOLAM 0.5 MG/1
TABLET ORAL
Qty: 90 TABLET | Refills: 0 | Status: SHIPPED | OUTPATIENT
Start: 2021-10-06 | End: 2022-05-28

## 2021-10-11 NOTE — TELEPHONE ENCOUNTER
Worker's Compensation Follow-Up Office Visit      Employer: [employer]  Date of Injury:  10/4/2021  Date of Service:  10/11/2021    HISTORY     Chief Complaint   Patient presents with   • Worker's Compensation     WRF LEFT ANKLE 1DOI 10/04/2021 [employer] EMAIL RTW TO [place] AT        HPI:  Hay Desir is a 39 year old male who is employed at the above company as a manager, with the company x 14-15 years, full time.   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  The injuries reportedly occurred during work activities.  States he did report it to the owner.       ALVARO:  Left foot slid off the skid steer and twisted/rolled inward.  Current Injury Diagnoses:  Left ankle sprain  Current Restrictions:  Air walker, Should be allowed to sit 50% of the shift in his short ankle boot.  Elevate left foot as much as possible when sitting. Avoid walking on uneven ground. No lifting greater than 20 pounds.   Functional Status:  working limited duties with restrictions    He is here for the follow up of the above injuries.  It has been 1 week since the injury date.  He is treating his injuries with ibuprofen, tylenol.      He is noting modest improvement in his symptoms.  He has been trying to walk without the boot when at home with pain still, but improving.  At work, he is tolerating the restrictions well.  He did try to go up and down the stairs at home and he feels fairly good with this with mild pain and holding the rails.  The swelling is improving.  He feels he can do more walking.        OBJECTIVE     Vitals:    10/11/21 1538   BP: 114/66   Pulse: 72   Weight: 77.3 kg (170 lb 6.7 oz)     Physical Exam  Constitutional:       General: He is not in acute distress.     Appearance: He is normal weight.      Comments: Gait is near normalized.   Musculoskeletal:      Left ankle: Swelling present. Tenderness present over the ATF ligament. Normal pulse.      Left  Next OV-8/17  Last OV-4/16 Achilles Tendon: Normal.        Feet:       Comments: Pain with attempted heel and toe walking.   Neurological:      Mental Status: He is alert.         ASSESSMENT     Encounter Diagnoses   Name Primary?   • Sprain of anterior talofibular ligament of left ankle, subsequent encounter Yes     Anticipated Maximum Medical Improvement: 2 weeks    PLAN   STATUS: This injury is determined to be WORK RELATED.    RETURN TO WORK:Employee may return to work with restrictions.   Return Date: 10/11/2021            RESTRICTIONS: Restrictions are to be followed at work and at home.  Restrictions are in effect until next follow-up visit.  Lift maximum to 40 pounds.  Continue avoid walking on uneven ground.  Continue with the boot at work.   Must be allowed to sit and rest as needed for 5 minutes every 2 hours or so.     TREATMENT PLAN:   Medications for this injury/condition: Tylenol as needed.  Referral/Consult: None  Diagnostic Testing: None   Drug test not required.  Breath alcohol test not required.      Instructions: None     NEXT RETURN VISIT:  IN PERSON ON 10/18/2021 AT 3:45 PM

## 2021-11-20 ENCOUNTER — APPOINTMENT (OUTPATIENT)
Dept: LAB | Age: 61
End: 2021-11-20
Payer: COMMERCIAL

## 2021-11-20 DIAGNOSIS — D72.9 ABNORMAL WBC COUNT: ICD-10-CM

## 2021-11-20 LAB
ALBUMIN SERPL BCP-MCNC: 3.9 G/DL (ref 3.5–5)
ALP SERPL-CCNC: 52 U/L (ref 46–116)
ALT SERPL W P-5'-P-CCNC: 19 U/L (ref 12–78)
ANION GAP SERPL CALCULATED.3IONS-SCNC: 3 MMOL/L (ref 4–13)
AST SERPL W P-5'-P-CCNC: 11 U/L (ref 5–45)
BASOPHILS # BLD AUTO: 0.02 THOUSANDS/ΜL (ref 0–0.1)
BASOPHILS NFR BLD AUTO: 1 % (ref 0–1)
BILIRUB SERPL-MCNC: 0.45 MG/DL (ref 0.2–1)
BUN SERPL-MCNC: 23 MG/DL (ref 5–25)
CALCIUM SERPL-MCNC: 8.8 MG/DL (ref 8.3–10.1)
CHLORIDE SERPL-SCNC: 109 MMOL/L (ref 100–108)
CO2 SERPL-SCNC: 26 MMOL/L (ref 21–32)
CREAT SERPL-MCNC: 0.78 MG/DL (ref 0.6–1.3)
EOSINOPHIL # BLD AUTO: 0.09 THOUSAND/ΜL (ref 0–0.61)
EOSINOPHIL NFR BLD AUTO: 2 % (ref 0–6)
ERYTHROCYTE [DISTWIDTH] IN BLOOD BY AUTOMATED COUNT: 11.9 % (ref 11.6–15.1)
GFR SERPL CREATININE-BSD FRML MDRD: 83 ML/MIN/1.73SQ M
GLUCOSE P FAST SERPL-MCNC: 100 MG/DL (ref 65–99)
HCT VFR BLD AUTO: 37.8 % (ref 34.8–46.1)
HGB BLD-MCNC: 12.3 G/DL (ref 11.5–15.4)
IMM GRANULOCYTES # BLD AUTO: 0.01 THOUSAND/UL (ref 0–0.2)
IMM GRANULOCYTES NFR BLD AUTO: 0 % (ref 0–2)
LYMPHOCYTES # BLD AUTO: 1.71 THOUSANDS/ΜL (ref 0.6–4.47)
LYMPHOCYTES NFR BLD AUTO: 45 % (ref 14–44)
MCH RBC QN AUTO: 31 PG (ref 26.8–34.3)
MCHC RBC AUTO-ENTMCNC: 32.5 G/DL (ref 31.4–37.4)
MCV RBC AUTO: 95 FL (ref 82–98)
MONOCYTES # BLD AUTO: 0.44 THOUSAND/ΜL (ref 0.17–1.22)
MONOCYTES NFR BLD AUTO: 12 % (ref 4–12)
NEUTROPHILS # BLD AUTO: 1.51 THOUSANDS/ΜL (ref 1.85–7.62)
NEUTS SEG NFR BLD AUTO: 40 % (ref 43–75)
NRBC BLD AUTO-RTO: 0 /100 WBCS
PLATELET # BLD AUTO: 223 THOUSANDS/UL (ref 149–390)
PMV BLD AUTO: 10.5 FL (ref 8.9–12.7)
POTASSIUM SERPL-SCNC: 4.3 MMOL/L (ref 3.5–5.3)
PROT SERPL-MCNC: 6.8 G/DL (ref 6.4–8.2)
RBC # BLD AUTO: 3.97 MILLION/UL (ref 3.81–5.12)
SODIUM SERPL-SCNC: 138 MMOL/L (ref 136–145)
WBC # BLD AUTO: 3.78 THOUSAND/UL (ref 4.31–10.16)

## 2021-11-20 PROCEDURE — 36415 COLL VENOUS BLD VENIPUNCTURE: CPT

## 2021-11-20 PROCEDURE — 85025 COMPLETE CBC W/AUTO DIFF WBC: CPT

## 2021-11-20 PROCEDURE — 80053 COMPREHEN METABOLIC PANEL: CPT

## 2021-11-24 ENCOUNTER — TELEMEDICINE (OUTPATIENT)
Dept: INTERNAL MEDICINE CLINIC | Facility: CLINIC | Age: 61
End: 2021-11-24
Payer: COMMERCIAL

## 2021-11-24 DIAGNOSIS — D70.9 NEUTROPENIA, UNSPECIFIED TYPE (HCC): ICD-10-CM

## 2021-11-24 DIAGNOSIS — R79.9 ABNORMAL BLOOD CHEMISTRY: Primary | ICD-10-CM

## 2021-11-24 PROCEDURE — 1036F TOBACCO NON-USER: CPT | Performed by: INTERNAL MEDICINE

## 2021-11-24 PROCEDURE — 99214 OFFICE O/P EST MOD 30 MIN: CPT | Performed by: INTERNAL MEDICINE

## 2021-11-26 ENCOUNTER — APPOINTMENT (OUTPATIENT)
Dept: LAB | Age: 61
End: 2021-11-26
Payer: COMMERCIAL

## 2021-11-26 ENCOUNTER — TELEPHONE (OUTPATIENT)
Dept: HEMATOLOGY ONCOLOGY | Facility: CLINIC | Age: 61
End: 2021-11-26

## 2021-11-26 DIAGNOSIS — D70.9 CHRONIC IDIOPATHIC NEUTROPENIA (HCC): Primary | ICD-10-CM

## 2021-11-26 PROCEDURE — 84165 PROTEIN E-PHORESIS SERUM: CPT | Performed by: PATHOLOGY

## 2021-11-26 PROCEDURE — 84165 PROTEIN E-PHORESIS SERUM: CPT | Performed by: INTERNAL MEDICINE

## 2021-11-26 PROCEDURE — 36415 COLL VENOUS BLD VENIPUNCTURE: CPT | Performed by: INTERNAL MEDICINE

## 2021-11-26 PROCEDURE — 83883 ASSAY NEPHELOMETRY NOT SPEC: CPT

## 2021-11-26 PROCEDURE — 82784 ASSAY IGA/IGD/IGG/IGM EACH: CPT

## 2021-11-27 LAB
IGA SERPL-MCNC: 138 MG/DL (ref 70–400)
IGG SERPL-MCNC: 714 MG/DL (ref 700–1600)
IGM SERPL-MCNC: 84 MG/DL (ref 40–230)

## 2021-11-29 LAB
ALBUMIN SERPL ELPH-MCNC: 4.35 G/DL (ref 3.5–5)
ALBUMIN SERPL ELPH-MCNC: 66.9 % (ref 52–65)
ALPHA1 GLOB SERPL ELPH-MCNC: 0.21 G/DL (ref 0.1–0.4)
ALPHA1 GLOB SERPL ELPH-MCNC: 3.3 % (ref 2.5–5)
ALPHA2 GLOB SERPL ELPH-MCNC: 0.61 G/DL (ref 0.4–1.2)
ALPHA2 GLOB SERPL ELPH-MCNC: 9.4 % (ref 7–13)
BETA GLOB ABNORMAL SERPL ELPH-MCNC: 0.4 G/DL (ref 0.4–0.8)
BETA1 GLOB SERPL ELPH-MCNC: 6.2 % (ref 5–13)
BETA2 GLOB SERPL ELPH-MCNC: 3.9 % (ref 2–8)
BETA2+GAMMA GLOB SERPL ELPH-MCNC: 0.25 G/DL (ref 0.2–0.5)
GAMMA GLOB ABNORMAL SERPL ELPH-MCNC: 0.67 G/DL (ref 0.5–1.6)
GAMMA GLOB SERPL ELPH-MCNC: 10.3 % (ref 12–22)
IGG/ALB SER: 2.02 {RATIO} (ref 1.1–1.8)
PROT PATTERN SERPL ELPH-IMP: ABNORMAL
PROT SERPL-MCNC: 6.5 G/DL (ref 6.4–8.2)

## 2021-11-30 LAB
KAPPA LC FREE SER-MCNC: 11.1 MG/L (ref 3.3–19.4)
KAPPA LC FREE/LAMBDA FREE SER: 0.87 {RATIO} (ref 0.26–1.65)
LAMBDA LC FREE SERPL-MCNC: 12.8 MG/L (ref 5.7–26.3)

## 2021-12-13 ENCOUNTER — OFFICE VISIT (OUTPATIENT)
Dept: OBGYN CLINIC | Facility: CLINIC | Age: 61
End: 2021-12-13
Payer: COMMERCIAL

## 2021-12-13 VITALS
BODY MASS INDEX: 24.65 KG/M2 | SYSTOLIC BLOOD PRESSURE: 108 MMHG | WEIGHT: 153.4 LBS | DIASTOLIC BLOOD PRESSURE: 76 MMHG | HEIGHT: 66 IN

## 2021-12-13 DIAGNOSIS — Z01.419 WELL FEMALE EXAM WITH ROUTINE GYNECOLOGICAL EXAM: Primary | ICD-10-CM

## 2021-12-13 DIAGNOSIS — Z13.820 SCREENING FOR OSTEOPOROSIS: ICD-10-CM

## 2021-12-13 DIAGNOSIS — Z12.4 SCREENING FOR CERVICAL CANCER: ICD-10-CM

## 2021-12-13 DIAGNOSIS — E28.39 PREMATURE OVARIAN FAILURE: ICD-10-CM

## 2021-12-13 DIAGNOSIS — Z78.0 MENOPAUSE: ICD-10-CM

## 2021-12-13 PROCEDURE — S0610 ANNUAL GYNECOLOGICAL EXAMINA: HCPCS | Performed by: OBSTETRICS & GYNECOLOGY

## 2021-12-22 ENCOUNTER — CONSULT (OUTPATIENT)
Dept: HEMATOLOGY ONCOLOGY | Facility: CLINIC | Age: 61
End: 2021-12-22
Payer: COMMERCIAL

## 2021-12-22 VITALS
DIASTOLIC BLOOD PRESSURE: 72 MMHG | BODY MASS INDEX: 24.65 KG/M2 | WEIGHT: 153.4 LBS | SYSTOLIC BLOOD PRESSURE: 124 MMHG | TEMPERATURE: 98.1 F | RESPIRATION RATE: 16 BRPM | HEART RATE: 93 BPM | OXYGEN SATURATION: 98 % | HEIGHT: 66 IN

## 2021-12-22 DIAGNOSIS — D72.9 ABNORMAL WBC COUNT: ICD-10-CM

## 2021-12-22 DIAGNOSIS — D70.9 NEUTROPENIA, UNSPECIFIED TYPE (HCC): Primary | ICD-10-CM

## 2021-12-22 DIAGNOSIS — R79.9 ABNORMAL BLOOD CHEMISTRY: ICD-10-CM

## 2021-12-22 PROCEDURE — 3008F BODY MASS INDEX DOCD: CPT | Performed by: INTERNAL MEDICINE

## 2021-12-22 PROCEDURE — 99204 OFFICE O/P NEW MOD 45 MIN: CPT | Performed by: INTERNAL MEDICINE

## 2021-12-22 PROCEDURE — 1036F TOBACCO NON-USER: CPT | Performed by: INTERNAL MEDICINE

## 2022-01-17 ENCOUNTER — TELEPHONE (OUTPATIENT)
Dept: OBGYN CLINIC | Facility: CLINIC | Age: 62
End: 2022-01-17

## 2022-01-21 ENCOUNTER — APPOINTMENT (OUTPATIENT)
Dept: LAB | Age: 62
End: 2022-01-21
Payer: COMMERCIAL

## 2022-01-21 DIAGNOSIS — D70.9 NEUTROPENIA, UNSPECIFIED TYPE (HCC): ICD-10-CM

## 2022-01-21 DIAGNOSIS — D72.9 ABNORMAL WBC COUNT: ICD-10-CM

## 2022-01-21 LAB
BASOPHILS # BLD AUTO: 0.04 THOUSANDS/ΜL (ref 0–0.1)
BASOPHILS NFR BLD AUTO: 1 % (ref 0–1)
EOSINOPHIL # BLD AUTO: 0.11 THOUSAND/ΜL (ref 0–0.61)
EOSINOPHIL NFR BLD AUTO: 2 % (ref 0–6)
ERYTHROCYTE [DISTWIDTH] IN BLOOD BY AUTOMATED COUNT: 11.9 % (ref 11.6–15.1)
HCT VFR BLD AUTO: 37.5 % (ref 34.8–46.1)
HGB BLD-MCNC: 12.2 G/DL (ref 11.5–15.4)
IMM GRANULOCYTES # BLD AUTO: 0.01 THOUSAND/UL (ref 0–0.2)
IMM GRANULOCYTES NFR BLD AUTO: 0 % (ref 0–2)
LYMPHOCYTES # BLD AUTO: 2.48 THOUSANDS/ΜL (ref 0.6–4.47)
LYMPHOCYTES NFR BLD AUTO: 44 % (ref 14–44)
MCH RBC QN AUTO: 30.7 PG (ref 26.8–34.3)
MCHC RBC AUTO-ENTMCNC: 32.5 G/DL (ref 31.4–37.4)
MCV RBC AUTO: 95 FL (ref 82–98)
MONOCYTES # BLD AUTO: 0.44 THOUSAND/ΜL (ref 0.17–1.22)
MONOCYTES NFR BLD AUTO: 8 % (ref 4–12)
NEUTROPHILS # BLD AUTO: 2.57 THOUSANDS/ΜL (ref 1.85–7.62)
NEUTS SEG NFR BLD AUTO: 45 % (ref 43–75)
NRBC BLD AUTO-RTO: 0 /100 WBCS
PLATELET # BLD AUTO: 216 THOUSANDS/UL (ref 149–390)
PMV BLD AUTO: 10.3 FL (ref 8.9–12.7)
RBC # BLD AUTO: 3.97 MILLION/UL (ref 3.81–5.12)
WBC # BLD AUTO: 5.65 THOUSAND/UL (ref 4.31–10.16)

## 2022-01-21 PROCEDURE — 36415 COLL VENOUS BLD VENIPUNCTURE: CPT

## 2022-01-21 PROCEDURE — 86200 CCP ANTIBODY: CPT

## 2022-01-21 PROCEDURE — 85025 COMPLETE CBC W/AUTO DIFF WBC: CPT

## 2022-01-21 PROCEDURE — 86038 ANTINUCLEAR ANTIBODIES: CPT

## 2022-01-21 PROCEDURE — 88184 FLOWCYTOMETRY/ TC 1 MARKER: CPT

## 2022-01-21 PROCEDURE — 88185 FLOWCYTOMETRY/TC ADD-ON: CPT

## 2022-01-24 DIAGNOSIS — E28.39 PREMATURE OVARIAN FAILURE: Primary | ICD-10-CM

## 2022-01-24 LAB
RYE IGE QN: NEGATIVE
SCAN RESULT: NORMAL

## 2022-01-24 RX ORDER — ESTRADIOL 0.5 MG/1
0.5 TABLET ORAL 2 TIMES WEEKLY
Qty: 24 TABLET | Refills: 3 | Status: SHIPPED | OUTPATIENT
Start: 2022-01-24 | End: 2022-03-30

## 2022-01-25 LAB — CCP AB SER IA-ACNC: 2.1

## 2022-02-06 DIAGNOSIS — F51.01 PRIMARY INSOMNIA: ICD-10-CM

## 2022-02-06 RX ORDER — TRAZODONE HYDROCHLORIDE 50 MG/1
TABLET ORAL
Qty: 60 TABLET | Refills: 2 | Status: SHIPPED | OUTPATIENT
Start: 2022-02-06 | End: 2022-08-05 | Stop reason: SDUPTHER

## 2022-02-09 ENCOUNTER — TELEPHONE (OUTPATIENT)
Dept: OBGYN CLINIC | Facility: MEDICAL CENTER | Age: 62
End: 2022-02-09

## 2022-02-09 NOTE — TELEPHONE ENCOUNTER
Patient calling for appointment, but she does not know what is wrong with her elbow, experiencing pain, she wants to have an xray thru her primary dr then call us back

## 2022-02-10 ENCOUNTER — HOSPITAL ENCOUNTER (OUTPATIENT)
Dept: RADIOLOGY | Age: 62
Discharge: HOME | End: 2022-02-10
Payer: COMMERCIAL

## 2022-02-10 VITALS — BODY MASS INDEX: 24.33 KG/M2 | HEIGHT: 67 IN | WEIGHT: 155 LBS

## 2022-02-10 DIAGNOSIS — Z12.31 SCREENING MAMMOGRAM, ENCOUNTER FOR: ICD-10-CM

## 2022-02-10 PROCEDURE — 77067 SCR MAMMO BI INCL CAD: CPT

## 2022-02-10 PROCEDURE — 77063 BREAST TOMOSYNTHESIS BI: CPT

## 2022-02-18 ENCOUNTER — TELEMEDICINE (OUTPATIENT)
Dept: INTERNAL MEDICINE CLINIC | Facility: CLINIC | Age: 62
End: 2022-02-18
Payer: COMMERCIAL

## 2022-02-18 DIAGNOSIS — M77.8 TENDINITIS OF BOTH ELBOWS: Primary | ICD-10-CM

## 2022-02-18 PROCEDURE — 99213 OFFICE O/P EST LOW 20 MIN: CPT | Performed by: INTERNAL MEDICINE

## 2022-02-18 PROCEDURE — 1036F TOBACCO NON-USER: CPT | Performed by: INTERNAL MEDICINE

## 2022-02-18 RX ORDER — METHYLPREDNISOLONE 4 MG/1
TABLET ORAL
Qty: 21 EACH | Refills: 0 | Status: SHIPPED | OUTPATIENT
Start: 2022-02-18 | End: 2022-03-30

## 2022-02-18 NOTE — PROGRESS NOTES
Virtual Regular Visit    Verification of patient location:    Patient is located in the following state in which I hold an active license [place]      Assessment/Plan:  Try steroid taper  # of ortho provided  Problem List Items Addressed This Visit     None      Visit Diagnoses     Tendinitis of both elbows    -  Primary    Relevant Medications    methylPREDNISolone 4 MG tablet therapy pack    Other Relevant Orders    Ambulatory Referral to Orthopedic Surgery               Reason for visit is   Chief Complaint   Patient presents with    Virtual Regular Visit        Encounter provider Merlinda Abraham, MD    Provider located at [address] [place] [id number]      Recent Visits  No visits were found meeting these conditions  Showing recent visits within past 7 days and meeting all other requirements  Today's Visits  Date Type Provider Dept   02/18/22 Telemedicine Merlinda Abraham, MD [facility] today's visits and meeting all other requirements  Future Appointments  No visits were found meeting these conditions  Showing future appointments within next 150 days and meeting all other requirements       The patient was identified by name and date of birth  Dayton Fong was informed that this is a telemedicine visit and that the visit is being conducted through [facility] and patient was informed this is a secure, HIPAA-complaint platform  She agrees to proceed     My office door was closed  No one else was in the room  She acknowledged consent and understanding of privacy and security of the video platform  The patient has agreed to participate and understands they can discontinue the visit at any time  Patient is aware this is a billable service  Subjective  Dayton Fong is a 62 y o  female with pain in the elbows         HPI   Pain in her elbows first started 2 years ago but then improved with rest and using braces  This recurred 6-8 months ago without improvement from exercise modification, NSAIDs and braces  There is no obvious swelling  The pain is worse on the left  There is numbness in the right forearm and hand in the morning  She cannot tell if the pain in the elbow is on the lateral or medial aspect    Past Medical History:   Diagnosis Date    Dyslipidemia     Last assessed: 7/30/15    Exposure to SARS-associated coronavirus 6/5/2020       Past Surgical History:   Procedure Laterality Date    ARTHROPLASTY Left 2008    with Prosthesis Trapezium  Thumb w/LRTI  Dr Jf Mandel  Last assessed: 8/1/16    TONSILLECTOMY      Last assessed: 8/1/16       Current Outpatient Medications   Medication Sig Dispense Refill    ALPRAZolam (XANAX) 0 5 mg tablet TAKE 1 TO 2 TABLETS BY MOUTH AT NIGHT AS NEEDED FOR INSOMNIA 90 tablet 0    Beclomethasone Dipropionate 80 MCG/ACT AERS 2 Act (160 mcg total) into each nostril daily 10 6 g 1    clotrimazole-betamethasone (LOTRISONE) 1-0 05 % cream Apply topically 2 (two) times a day 90 g 1    estradiol (Estrace) 0 5 MG tablet Take 1 tablet (0 5 mg total) by mouth 2 (two) times a week 24 tablet 3    methylPREDNISolone 4 MG tablet therapy pack Use as directed on package 21 each 0    norethindrone (Aygestin) 5 mg tablet Take 1 tablet (5 mg total) by mouth 2 (two) times a week 24 tablet 3    Synthroid 137 MCG tablet TAKE ONE TABLET BY MOUTH DAILY ON AN EMPTY STOMACH 90 tablet 3    traZODone (DESYREL) 50 mg tablet TAKE 1-2 TABLET TAKE TABLET BY MOUTH NIGHTLY FOR INSOMNIA 60 tablet 2     No current facility-administered medications for this visit  Allergies   Allergen Reactions    Pollen Extract        Review of Systems   Musculoskeletal: Positive for arthralgias  Negative for joint swelling  Neurological: Positive for numbness  Video Exam    There were no vitals filed for this visit  Physical Exam  Constitutional:       General: She is not in acute distress       Appearance: She is not ill-appearing, toxic-appearing or diaphoretic  Psychiatric:         Mood and Affect: Mood normal          Behavior: Behavior normal           I spent 15 minutes directly with the patient during this visit    VIRTUAL VISIT 1638 Darinel Barkley verbally agrees to participate in [employer]  Pt is aware that [employer] could be limited without vital signs or the ability to perform a full hands-on physical Tana Deepthi understands she or the provider may request at any time to terminate the video visit and request the patient to seek care or treatment in person

## 2022-03-22 ENCOUNTER — TELEPHONE (OUTPATIENT)
Dept: OBGYN CLINIC | Facility: CLINIC | Age: 62
End: 2022-03-22

## 2022-03-22 NOTE — TELEPHONE ENCOUNTER
Hello,  Please advise if the following patient can be forced onto the schedule:    Patient: Tee Winston    :1960    OFU:[id number]    Call back #: [phone]    Insurance: [facility]     Reason for appointment: b/l elbow pain  Offered sports med and Ortho immediate care and refused       Requested doctor/location: Any       Thank you

## 2022-03-22 NOTE — TELEPHONE ENCOUNTER
I LEFT  for patient to call us back to schedule with DR [facility] [employer] (elbow specialist)    Awaiting call back

## 2022-03-30 ENCOUNTER — OFFICE VISIT (OUTPATIENT)
Dept: OBGYN CLINIC | Facility: CLINIC | Age: 62
End: 2022-03-30
Payer: COMMERCIAL

## 2022-03-30 ENCOUNTER — HOSPITAL ENCOUNTER (OUTPATIENT)
Dept: RADIOLOGY | Facility: HOSPITAL | Age: 62
Discharge: HOME | End: 2022-03-30
Attending: ORTHOPAEDIC SURGERY
Payer: COMMERCIAL

## 2022-03-30 ENCOUNTER — TELEPHONE (OUTPATIENT)
Dept: HEMATOLOGY ONCOLOGY | Facility: CLINIC | Age: 62
End: 2022-03-30

## 2022-03-30 ENCOUNTER — DOCUMENTATION (OUTPATIENT)
Dept: HEMATOLOGY ONCOLOGY | Facility: CLINIC | Age: 62
End: 2022-03-30

## 2022-03-30 ENCOUNTER — TELEMEDICINE (OUTPATIENT)
Dept: HEMATOLOGY ONCOLOGY | Facility: CLINIC | Age: 62
End: 2022-03-30
Payer: COMMERCIAL

## 2022-03-30 VITALS — BODY MASS INDEX: 23.23 KG/M2 | WEIGHT: 148 LBS | HEIGHT: 67 IN

## 2022-03-30 DIAGNOSIS — M77.8 TENDINITIS OF BOTH ELBOWS: ICD-10-CM

## 2022-03-30 DIAGNOSIS — D72.9 ABNORMAL WBC COUNT: Primary | ICD-10-CM

## 2022-03-30 PROCEDURE — 1036F TOBACCO NON-USER: CPT | Performed by: ORTHOPAEDIC SURGERY

## 2022-03-30 PROCEDURE — 99213 OFFICE O/P EST LOW 20 MIN: CPT | Performed by: PHYSICIAN ASSISTANT

## 2022-03-30 PROCEDURE — 99214 OFFICE O/P EST MOD 30 MIN: CPT | Performed by: ORTHOPAEDIC SURGERY

## 2022-03-30 PROCEDURE — 73080 X-RAY EXAM OF ELBOW: CPT

## 2022-03-30 PROCEDURE — 3008F BODY MASS INDEX DOCD: CPT | Performed by: ORTHOPAEDIC SURGERY

## 2022-03-30 NOTE — TELEPHONE ENCOUNTER
Patient called in requesting a virtual appointment for 3/30/22 at [address] [place] [address]

## 2022-03-30 NOTE — PROGRESS NOTES
[address] [facility] [address]  [address]  [id number]  1960    ORTHOPAEDIC SURGERY OUTPATIENT NOTE  3/30/2022      HISTORY:  62 y o  female  Elbow Pain: Patient complains of bilaterally elbow pain  Onset of the symptoms was 2 years ago  Inciting event: swimming and lifting weights for exercise    Current symptoms include point tenderness at the lateral epicondyle that can radiate down the proximal forearm  Also complains of numbness and tingling of the hand at night that gets better throughout the day    Pain is aggravated by: lifting heavy objects, lifting coffee mug on left side, swimming    Patient's overall course: stable  Patient has had no prior elbow problems  Evaluation to date: medrol dose pack, OTC antiinflammatories    Treatment to date: avoidance of offending activity  Past Medical History:   Diagnosis Date    Dyslipidemia     Last assessed: 7/30/15    Exposure to SARS-associated coronavirus 6/5/2020       Past Surgical History:   Procedure Laterality Date    ARTHROPLASTY Left 2008    with Prosthesis Trapezium  Thumb w/LRTI  Dr Shruthi Ferreira   Last assessed: 8/1/16    TONSILLECTOMY      Last assessed: 8/1/16       Social History     Socioeconomic History    Marital status: /Civil Union     Spouse name: Not on file    Number of children: 2    Years of education: Not on file    Highest education level: Not on file   Occupational History    Not on file   Tobacco Use    Smoking status: Never Smoker    Smokeless tobacco: Never Used   Vaping Use    Vaping Use: Never used   Substance and Sexual Activity    Alcohol use: Yes     Comment: One beverage daily 7/2015    Drug use: No    Sexual activity: Yes     Partners: Male     Birth control/protection: Post-menopausal   Other Topics Concern    Not on file   Social History Narrative    Exercises daily     Social Determinants of Health     Financial Resource Strain: Not on file   Food Insecurity: Not on file   Transportation Needs: Not on file   Physical Activity: Not on file   Stress: Not on file   Social Connections: Not on file   Intimate Partner Violence: Not on file   Housing Stability: Not on file       Family History   Problem Relation Age of Onset    Alzheimer's disease Mother     Hyperlipidemia Mother     Colon cancer Father     No Known Problems Daughter     No Known Problems Maternal Grandmother     No Known Problems Maternal Grandfather     No Known Problems Paternal Grandmother     No Known Problems Paternal Grandfather     No Known Problems Daughter     Pancreatic cancer Maternal Aunt     No Known Problems Maternal Aunt         Patient's Medications   New Prescriptions    DICLOFENAC SODIUM (VOLTAREN) 1 %    Apply 2 g topically 4 (four) times a day   Previous Medications    ALPRAZOLAM (XANAX) 0 5 MG TABLET    TAKE 1 TO 2 TABLETS BY MOUTH AT NIGHT AS NEEDED FOR INSOMNIA    BECLOMETHASONE DIPROPIONATE 80 MCG/ACT AERS    2 Act (160 mcg total) into each nostril daily    CLOTRIMAZOLE-BETAMETHASONE (LOTRISONE) 1-0 05 % CREAM    Apply topically 2 (two) times a day    ESTRADIOL (ESTRACE) 0 5 MG TABLET    Take 1 tablet (0 5 mg total) by mouth 2 (two) times a week    METHYLPREDNISOLONE 4 MG TABLET THERAPY PACK    Use as directed on package    MULTIPLE VITAMIN (MULTIVITAMIN ADULT PO)    Take by mouth    NORETHINDRONE (AYGESTIN) 5 MG TABLET    Take 1 tablet (5 mg total) by mouth 2 (two) times a week    SYNTHROID 137 MCG TABLET    TAKE ONE TABLET BY MOUTH DAILY ON AN EMPTY STOMACH    TRAZODONE (DESYREL) 50 MG TABLET    TAKE 1-2 TABLET TAKE TABLET BY MOUTH NIGHTLY FOR INSOMNIA   Modified Medications    No medications on file   Discontinued Medications    No medications on file       Allergies   Allergen Reactions    Pollen Extract         Ht 5' 6 5" (1 689 m)   Wt 67 1 kg (148 lb)   BMI 23 53 kg/m²      REVIEW OF SYSTEMS:  Constitutional: Negative  HEENT: Negative  Respiratory: Negative  Skin: Negative  Neurological: Negative  Psychiatric/Behavioral: Negative  Musculoskeletal: Negative except for that mentioned in the HPI  PHYSICAL EXAM:      R elbow:  Flexion: 140 degrees  Extension: 0 degrees  Pronation: 80 degrees  Supination: 80 degrees    TTP Lateral Epicondyle: negative  TTP Medial Epicondyle: negative  TTP Olecranon: negative  TTP Radial Head: negative  TTP Biceps Tendon: negative    Strength:  Flexion: 5/5  Extension: 5/5  Pronation: 5/5  Supination: 5/5    Pain with resisted wrist extension: negative  Pain with resisted 3rd finger extension: negative  Pain with resisted wrist flexion: negative    Varus laxity: negative  Valgus laxity: negative  Milking maneuver: negative  Moving valgus stress test: negative    Cubital tunnel Tinel's: negative    Radial/median/ulnar nerve intact    <2 sec cap refill      L elbow:  Flexion: 140 degrees  Extension: 0 degrees  Pronation: 80 degrees  Supination: 80 degrees    TTP Lateral Epicondyle: positive  TTP Medial Epicondyle: negative  TTP Olecranon: negative  TTP Radial Head: negative  TTP Biceps Tendon: negative    Strength:  Flexion: 5/5  Extension: 5/5  Pronation: 5/5  Supination: 5/5    Pain with resisted wrist extension:  Positive  Pain with resisted 3rd finger extension:  Positive the  Pain with resisted wrist flexion: negative    Varus laxity: negative  Valgus laxity: negative  Milking maneuver: negative  Moving valgus stress test: negative    Cubital tunnel Tinel's: negative    Radial/median/ulnar nerve intact    <2 sec cap refill    Neck:   Spurling's Maneuver: negative  FROM flexion, extension, rotation, sidebending    Reflexes:   Triceps: symmetric bilaterally  Biceps: symmetric bilaterally  Brachioradialis: symmetric bilaterally      Integumentary: -  Bruising: -  Abrasion: -   Rash -  Laceration: -      IMAGING:  X-ray bilateral elbows:   There is no fracture subluxation dislocation  No significant degenerative changes seen  ASSESSMENT AND PLAN:  62 y o  female  with bilateral lateral epicondylitis  At this time will include conservative management with bilateral elbow straps  Voltaren gel was also ordered  She was also given home exercises she wishes not to undergo formal physical therapy at this time  After Three months of treatment If she does not see any improvement we will explore steroid injection and possible formal physical therapy

## 2022-03-30 NOTE — PROGRESS NOTES
Virtual Regular Visit    Verification of patient location:    Patient is located in the following state in which I hold an active license [place]      Assessment/Plan:    Progressive mild leukopenia since 2019       11/20/2021,  WBC of 3 7 40% neutrophils 45% lymphocytes, 12% monocytes  No evidence of anemia, thrombocytopenia  She drinks wine on average 10-15 glasses every week, this might be related to alcohol consumption,    She was advised to cut down significantly on her alcohol consumption and to take a Multivitamin 1 tablet p o  daily  She reports she did start MVI and did cut back on alcohol  Rule out autoimmune leukopenia because of previous history of premature ovarian failure, possibly Graves disease status post iodine ablation      1/21/22:  CCP normal, FLORINDA negative and normal flow cytometry on the peripheral blood  Hemoglobin 12 2, white blood cell count 5 65, 45% neutrophils, 44% lymphocytes, 8% monocytes, platelets 971    Reviewed labs  Normal WBC  F/U prn  History of Presenting Illness:  Carol Ward is a 81-year-old  female seen initially 12/22/21 regarding Leukopenia  She has a 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 28, hyperthyroidism status post surgical resection and iodine ablation with subsequent medical hypothyroidism, insomnia, dyslipidemia  Since 2019 she was found to have mild progressive leukopenia  No evidence of frequent upper respiratory tract infections, skin infections or sore throat      She had COVID-19 vaccine [employer]     She drinks alcohol at least 10-15 glasses of wine every week on average she does not smoke cigarette, she works as a   Interval History:  Patient did start MVI    Cut back some on alcohol         Encounter provider Sravanthi Sr PA-C    Provider located at [address] [place] [address]  [phone]      Recent Visits  No visits were found meeting these conditions  Showing recent visits within past 7 days and meeting all other requirements  Today's Visits  Date Type Provider Dept   03/30/22 Telephone Makayla Eliasefraín, [address] today's visits and meeting all other requirements  Future Appointments  No visits were found meeting these conditions  Showing future appointments within next 150 days and meeting all other requirements       The patient was identified by name and date of birth  Morgan Millan was informed that this is a telemedicine visit and that the visit is being conducted through [facility] and patient was informed this is a secure, HIPAA-complaint platform  She agrees to proceed     My office door was closed  No one else was in the room  She acknowledged consent and understanding of privacy and security of the video platform  The patient has agreed to participate and understands they can discontinue the visit at any time  Patient is aware this is a billable service  Past Medical History:   Diagnosis Date    Dyslipidemia     Last assessed: 7/30/15    Exposure to SARS-associated coronavirus 6/5/2020       Past Surgical History:   Procedure Laterality Date    ARTHROPLASTY Left 2008    with Prosthesis Trapezium  Thumb w/LRTI  Dr Linh Long   Last assessed: 8/1/16    TONSILLECTOMY      Last assessed: 8/1/16       Current Outpatient Medications   Medication Sig Dispense Refill    ALPRAZolam (XANAX) 0 5 mg tablet TAKE 1 TO 2 TABLETS BY MOUTH AT NIGHT AS NEEDED FOR INSOMNIA 90 tablet 0    Beclomethasone Dipropionate 80 MCG/ACT AERS 2 Act (160 mcg total) into each nostril daily 10 6 g 1    clotrimazole-betamethasone (LOTRISONE) 1-0 05 % cream Apply topically 2 (two) times a day 90 g 1    Diclofenac Sodium (VOLTAREN) 1 % Apply 2 g topically 4 (four) times a day 200 g 3    estradiol (Estrace) 0 5 MG tablet Take 1 tablet (0 5 mg total) by mouth 2 (two) times a week (Patient not taking: Reported on 3/30/2022 ) 24 tablet 3    methylPREDNISolone 4 MG tablet therapy pack Use as directed on package (Patient not taking: Reported on 3/30/2022 ) 21 each 0    Multiple Vitamin (MULTIVITAMIN ADULT PO) Take by mouth      norethindrone (Aygestin) 5 mg tablet Take 1 tablet (5 mg total) by mouth 2 (two) times a week (Patient not taking: Reported on 3/30/2022 ) 24 tablet 3    Synthroid 137 MCG tablet TAKE ONE TABLET BY MOUTH DAILY ON AN EMPTY STOMACH 90 tablet 3    traZODone (DESYREL) 50 mg tablet TAKE 1-2 TABLET TAKE TABLET BY MOUTH NIGHTLY FOR INSOMNIA 60 tablet 2     No current facility-administered medications for this visit  Allergies   Allergen Reactions    Pollen Extract        Review of Systems    Video Exam    There were no vitals filed for this visit  Physical Exam       VIRTUAL VISIT DISCLAIMER      Chelo Carrasquillo verbally agrees to participate in [employer]  Pt is aware that [employer] could be limited without vital signs or the ability to perform a full hands-on physical Peng Edwards understands she or the provider may request at any time to terminate the video visit and request the patient to seek care or treatment in person

## 2022-03-30 NOTE — PROGRESS NOTES
recvd email from [facility] that pt recvd a $6k bill for the [facility] 01/21/22 for labs  cked the acct & as of today pt has a $20 bal  For the [facility] 01/21/22 pt did pay the $500 deduct  Called the pt to go over this info & got pts voicemail  Left a message for pt to call me back  Meena Silveira

## 2022-04-14 ENCOUNTER — TELEPHONE (OUTPATIENT)
Dept: ADMINISTRATIVE | Facility: OTHER | Age: 62
End: 2022-04-14

## 2022-04-14 ENCOUNTER — OFFICE VISIT (OUTPATIENT)
Dept: INTERNAL MEDICINE CLINIC | Facility: CLINIC | Age: 62
End: 2022-04-14
Payer: COMMERCIAL

## 2022-04-14 VITALS — SYSTOLIC BLOOD PRESSURE: 112 MMHG | HEART RATE: 83 BPM | DIASTOLIC BLOOD PRESSURE: 64 MMHG | OXYGEN SATURATION: 97 %

## 2022-04-14 DIAGNOSIS — Z48.02 VISIT FOR SUTURE REMOVAL: Primary | ICD-10-CM

## 2022-04-14 PROCEDURE — 1036F TOBACCO NON-USER: CPT | Performed by: NURSE PRACTITIONER

## 2022-04-14 PROCEDURE — 99213 OFFICE O/P EST LOW 20 MIN: CPT | Performed by: NURSE PRACTITIONER

## 2022-04-14 NOTE — PROGRESS NOTES
--------------------------------------------------------------------------------------------------------

## 2022-04-14 NOTE — TELEPHONE ENCOUNTER
----- Message from Franklin Stephenson sent at 4/13/2022  2:37 PM EDT -----  Regarding: cervical cancer screening    Hello, our patient attached above has had Pap Smear (HPV) aka Cervical Cancer Screening completed/performed  Please assist in updating the patient chart by pulling the Care Everywhere (CE) document  The date of service is 8/19/19       Thank you,

## 2022-04-14 NOTE — TELEPHONE ENCOUNTER
Upon review of the In Basket request we were able to locate, review, and update the patient chart as requested for Pap Smear (HPV) aka Cervical Cancer Screening  Any additional questions or concerns should be emailed to the Practice Liaisons via [email or web address] email, please do not reply via In Basket      Thank you  Davi Olea

## 2022-04-16 PROBLEM — Z48.02 VISIT FOR SUTURE REMOVAL: Status: ACTIVE | Noted: 2022-04-16

## 2022-04-17 NOTE — PROGRESS NOTES
Assessment/Plan:    Visit for suture removal  Two sutures removed   No sign of infection or bleeding       Diagnoses and all orders for this visit:    Visit for suture removal    Other orders  -     Suture removal          Subjective:      Patient ID: Andreas Mueller is a 62 y o  female  Patient is here for suture removal of two sutures left thumb      The following portions of the patient's history were reviewed and updated as appropriate: allergies, current medications, past family history, past medical history, past social history, past surgical history and problem list     Review of Systems      Objective:      /64   Pulse 83   SpO2 97%       Suture removal    Date/Time: 4/16/2022 8:41 PM  Performed by: ASIA Segundo  Authorized by: ASIA Segundo   Universal Protocol:  Consent: Verbal consent obtained  Consent given by: patient  Patient identity confirmed: verbally with patient        Patient location:  Clinic  Location:     Laterality:  Left    Location:  Upper extremity    Upper extremity location:  Hand    Hand location:  L thumb  Procedure details: Tools used:  Suture removal kit    Wound appearance:  No sign(s) of infection, good wound healing and clean  Post-procedure details:     Patient tolerance of procedure:   Tolerated well, no immediate complications

## 2022-05-17 ENCOUNTER — TELEPHONE (OUTPATIENT)
Dept: OTHER | Facility: OTHER | Age: 62
End: 2022-05-17

## 2022-05-17 NOTE — TELEPHONE ENCOUNTER
Patient called in to advise that she fell and broke her thumb a few weeks ago, she has been seen through [facility]  it is not healing, her ortho at [facility] is not much of help also  She would like a call back with advice from Dr Afshan Avery to help find a better, more qualified, doctor to help her with her unhealed thumb  Patient does not mind if it is [facility]   Also, if you are unable to reach her she is ok with receiving a message via [employer]

## 2022-05-18 NOTE — TELEPHONE ENCOUNTER
She can do a virtual on Friday with dr Alexandria Britton so we can see the wound and decide which specialist will best assist her  She can also come in tomorrow with me

## 2022-05-20 DIAGNOSIS — S62.524A CLOSED NONDISPLACED FRACTURE OF DISTAL PHALANX OF RIGHT THUMB, INITIAL ENCOUNTER: Primary | ICD-10-CM

## 2022-05-20 NOTE — TELEPHONE ENCOUNTER
I recommend she see Dr Priest Her or [facility] in [address]  I will enter the order  I'll also send her a [employer] message

## 2022-05-20 NOTE — TELEPHONE ENCOUNTER
Spoke with pt, she does not wish to schedule a virtual bc of copay, she just wants to know who you recommend she sees for a second opinion - she is willing to go down to [place]    **PT IS A TEACHER PLS LMOM ADVISING**

## 2022-05-26 ENCOUNTER — HOSPITAL ENCOUNTER (OUTPATIENT)
Dept: RADIOLOGY | Age: 62
Discharge: HOME | End: 2022-05-26
Payer: COMMERCIAL

## 2022-05-26 DIAGNOSIS — Z78.0 MENOPAUSE: ICD-10-CM

## 2022-05-26 DIAGNOSIS — Z13.820 SCREENING FOR OSTEOPOROSIS: ICD-10-CM

## 2022-05-26 DIAGNOSIS — E28.39 PREMATURE OVARIAN FAILURE: ICD-10-CM

## 2022-05-26 PROCEDURE — 77080 DXA BONE DENSITY AXIAL: CPT

## 2022-05-27 DIAGNOSIS — F51.01 PRIMARY INSOMNIA: ICD-10-CM

## 2022-05-28 RX ORDER — ALPRAZOLAM 0.5 MG/1
TABLET ORAL
Qty: 90 TABLET | Refills: 0 | Status: SHIPPED | OUTPATIENT
Start: 2022-05-28

## 2022-07-06 ENCOUNTER — OFFICE VISIT (OUTPATIENT)
Dept: OBGYN CLINIC | Facility: CLINIC | Age: 62
End: 2022-07-06
Payer: COMMERCIAL

## 2022-07-06 VITALS
SYSTOLIC BLOOD PRESSURE: 118 MMHG | HEART RATE: 79 BPM | HEIGHT: 67 IN | BODY MASS INDEX: 23.07 KG/M2 | DIASTOLIC BLOOD PRESSURE: 70 MMHG | WEIGHT: 147 LBS | OXYGEN SATURATION: 98 %

## 2022-07-06 DIAGNOSIS — M77.11 LATERAL EPICONDYLITIS OF BOTH ELBOWS: Primary | ICD-10-CM

## 2022-07-06 DIAGNOSIS — M77.12 LATERAL EPICONDYLITIS OF BOTH ELBOWS: Primary | ICD-10-CM

## 2022-07-06 PROCEDURE — 99214 OFFICE O/P EST MOD 30 MIN: CPT | Performed by: ORTHOPAEDIC SURGERY

## 2022-07-06 PROCEDURE — 20551 NJX 1 TENDON ORIGIN/INSJ: CPT | Performed by: ORTHOPAEDIC SURGERY

## 2022-07-06 RX ORDER — LIDOCAINE HYDROCHLORIDE 10 MG/ML
2 INJECTION, SOLUTION INFILTRATION; PERINEURAL
Status: COMPLETED | OUTPATIENT
Start: 2022-07-06 | End: 2022-07-06

## 2022-07-06 RX ORDER — DEXAMETHASONE SODIUM PHOSPHATE 100 MG/10ML
40 INJECTION INTRAMUSCULAR; INTRAVENOUS
Status: COMPLETED | OUTPATIENT
Start: 2022-07-06 | End: 2022-07-06

## 2022-07-06 RX ADMIN — LIDOCAINE HYDROCHLORIDE 2 ML: 10 INJECTION, SOLUTION INFILTRATION; PERINEURAL at 09:09

## 2022-07-06 RX ADMIN — DEXAMETHASONE SODIUM PHOSPHATE 40 MG: 100 INJECTION INTRAMUSCULAR; INTRAVENOUS at 09:09

## 2022-07-06 NOTE — PROGRESS NOTES
[address] [address]  [address]  [id number]  1960    ORTHOPAEDIC SURGERY OUTPATIENT NOTE  7/6/2022      HISTORY:  65 y o  female who presents to the office today for a follow-up evaluation of her bilateral elbow lateral epicondylitis  She states that she has been complaint with performing the physician directed home exercise program  Left is worse than right  She states that she stopped working out which seemed to alleviate her pain  She states that her right elbow is not bothering her at this time  She rates her left elbow pain at a 7/10  She localizes her pain over the lateral aspect of her left elbow  Past Medical History:   Diagnosis Date    Dyslipidemia     Last assessed: 7/30/15    Exposure to SARS-associated coronavirus 6/5/2020       Past Surgical History:   Procedure Laterality Date    ARTHROPLASTY Left 2008    with Prosthesis Trapezium  Thumb w/LRTI  Dr Jeovanny Hobbs   Last assessed: 8/1/16    TONSILLECTOMY      Last assessed: 8/1/16       Social History     Socioeconomic History    Marital status: /Civil Union     Spouse name: Not on file    Number of children: 2    Years of education: Not on file    Highest education level: Not on file   Occupational History    Not on file   Tobacco Use    Smoking status: Never Smoker    Smokeless tobacco: Never Used   Vaping Use    Vaping Use: Never used   Substance and Sexual Activity    Alcohol use: Yes     Comment: One beverage daily 7/2015    Drug use: No    Sexual activity: Yes     Partners: Male     Birth control/protection: Post-menopausal   Other Topics Concern    Not on file   Social History Narrative    Exercises daily     Social Determinants of Health     Financial Resource Strain: Not on file   Food Insecurity: Not on file   Transportation Needs: Not on file   Physical Activity: Not on file   Stress: Not on file   Social Connections: Not on file   Intimate Partner Violence: Not on file   Housing Stability: Not on file       Family History   Problem Relation Age of Onset    Alzheimer's disease Mother     Hyperlipidemia Mother     Colon cancer Father     No Known Problems Daughter     No Known Problems Maternal Grandmother     No Known Problems Maternal Grandfather     No Known Problems Paternal Grandmother     No Known Problems Paternal Grandfather     No Known Problems Daughter     Pancreatic cancer Maternal Aunt     No Known Problems Maternal Aunt         Patient's Medications   New Prescriptions    No medications on file   Previous Medications    ALPRAZOLAM (XANAX) 0 5 MG TABLET    TAKE 1 TO 2 TABLETS BY MOUTH AT NIGHT AS NEEDED FOR INSOMNIA    BECLOMETHASONE DIPROPIONATE 80 MCG/ACT AERS    2 Act (160 mcg total) into each nostril daily    CLOTRIMAZOLE-BETAMETHASONE (LOTRISONE) 1-0 05 % CREAM    Apply topically 2 (two) times a day    DICLOFENAC SODIUM (VOLTAREN) 1 %    Apply 2 g topically 4 (four) times a day    MULTIPLE VITAMIN (MULTIVITAMIN ADULT PO)    Take by mouth    SYNTHROID 137 MCG TABLET    TAKE ONE TABLET BY MOUTH DAILY ON AN EMPTY STOMACH    TRAZODONE (DESYREL) 50 MG TABLET    TAKE 1-2 TABLET TAKE TABLET BY MOUTH NIGHTLY FOR INSOMNIA   Modified Medications    No medications on file   Discontinued Medications    No medications on file       Allergies   Allergen Reactions    Pollen Extract         /70   Pulse 79   Ht 5' 6 5" (1 689 m)   Wt 66 7 kg (147 lb)   SpO2 98%   BMI 23 37 kg/m²      REVIEW OF SYSTEMS:  Constitutional: Negative  HEENT: Negative  Respiratory: Negative  Skin: Negative  Neurological: Negative  Psychiatric/Behavioral: Negative  Musculoskeletal: Negative except for that mentioned in the HPI      PHYSICAL EXAM:      R elbow:  Flexion: 140 degrees  Extension: 0 degrees  Pronation: 80 degrees  Supination: 80 degrees    TTP Lateral Epicondyle: negative  TTP Medial Epicondyle: negative  TTP Olecranon: negative  TTP Radial Head: negative  TTP Biceps Tendon: negative    Strength:  Flexion: 5/5  Extension: 5/5  Pronation: 5/5  Supination: 5/5    Pain with resisted wrist extension: negative  Pain with resisted 3rd finger extension: negative  Pain with resisted wrist flexion: negative    Varus laxity: negative  Valgus laxity: negative  Milking maneuver: negative  Moving valgus stress test: negative    Cubital tunnel Tinel's: negative    Radial/median/ulnar nerve intact    <2 sec cap refill      L elbow:  Flexion: 140 degrees  Extension: 0 degrees  Pronation: 80 degrees  Supination: 80 degrees    TTP Lateral Epicondyle: negative  TTP Medial Epicondyle: negative  TTP Olecranon: negative  TTP Radial Head: negative  TTP Biceps Tendon: negative    Strength:  Flexion: 5/5  Extension: 5/5  Pronation: 5/5  Supination: 5/5    Pain with resisted wrist extension: positive  Pain with resisted 3rd finger extension: positive   Pain with resisted wrist flexion: negative    Varus laxity: negative  Valgus laxity: negative  Milking maneuver: negative  Moving valgus stress test: negative    Cubital tunnel Tinel's: negative    Radial/median/ulnar nerve intact    <2 sec cap refill    IMAGING:  No new images reviewed today  ASSESSMENT AND PLAN:  65 y o  female bilateral elbow lateral epicondylitis    Unfortunately, she did not experience any pain relief from the physician directed home exercise program that was provided to her in regards to her left elbow  At today's visit, she is not experiencing any pain in her right elbow  Non operative treatments were discussed with the patient in the forms of a left elbow corticosteroid injection and formal physical therapy  We discussed the risks and benefits of corticosteroid injection today in the office and she did elect to proceed   The left elbow lateral epicondyle corticosteroid injection was administered without issue and well tolerated by the patient  This was done under sterile technique  Post injection instructions were provided  She understands can be repeated no sooner than three months  I discussed with the patient that she should continue with her physician directed home exercise program   I discussed with the patient that if she fails to see any improvements with her pain from the corticosteroid injection we would initiate formal physical therapy  She was instructed to call the office in a week's if she experiences no pain relief from the corticosteroid injection  She understood and had no further questions  Hand/upper extremity injection: L elbow  Universal Protocol:  Consent: Verbal consent obtained  Risks and benefits: risks, benefits and alternatives were discussed  Consent given by: patient  Time out: Immediately prior to procedure a "time out" was called to verify the correct patient, procedure, equipment, support staff and site/side marked as required    Timeout called at: 7/6/2022 9:06 AM   Site marked: the operative site was marked  Supporting Documentation  Indications: pain   Procedure Details  Condition:lateral epicondylitis Site: L elbow   Needle size: 25 G  Ultrasound guidance: no  Approach: lateral  Medications administered: 2 mL lidocaine 1 %; 40 mg dexamethasone 100 mg/10 mL    Patient tolerance: patient tolerated the procedure well with no immediate complications  Dressing:  Sterile dressing applied             Scribe Attestation    I,:  Karissa Montana am acting as a scribe while in the presence of the attending physician :       I,:  Montana Matamoros personally performed the services described in this documentation    as scribed in my presence :

## 2022-07-25 DIAGNOSIS — E78.2 MIXED HYPERLIPIDEMIA: ICD-10-CM

## 2022-07-25 DIAGNOSIS — E89.0 POSTOPERATIVE HYPOTHYROIDISM: Primary | ICD-10-CM

## 2022-08-02 ENCOUNTER — TELEPHONE (OUTPATIENT)
Dept: OBGYN CLINIC | Facility: OTHER | Age: 62
End: 2022-08-02

## 2022-08-02 NOTE — TELEPHONE ENCOUNTER
Patient called in , she is requesting a call back in regards to the braces for her elbows      She said "they never checked with insurance , gave me a price or anything and now I have a bill for $100 dollars for these braces that I do not even wear, I do not want to pay for the bill"    Can someone please call patient back    C/b # [phone]

## 2022-08-03 NOTE — TELEPHONE ENCOUNTER
Bell Huffman stated that there isn't much she can do about this  Patient has had the braces since March and had signed for them  They are not returnable  She is welcome to call the number at the top of the DME form ([phone]) if she has questions in regards to her bill and the billing department can help

## 2022-08-05 DIAGNOSIS — F51.01 PRIMARY INSOMNIA: ICD-10-CM

## 2022-08-05 RX ORDER — TRAZODONE HYDROCHLORIDE 50 MG/1
TABLET ORAL
Qty: 60 TABLET | Refills: 0 | Status: SHIPPED | OUTPATIENT
Start: 2022-08-05 | End: 2022-09-29

## 2022-08-05 NOTE — TELEPHONE ENCOUNTER
Patient would like to speak to you about this bill  She feels it was handled wrong on the medical office end  Her insurance did not cover the tennis elbow straps and she does not feel she should have to pay this bill  I explained to her again how we work with [facility]oul[facility] Rodriguez with the DMEs and that they bill for this item to the patient's insurance  She states we did not tell her what the straps cost, however we do not know what each item costs, so we cannot provide this information  Would you please give her a call?   Thanks

## 2022-08-28 ENCOUNTER — APPOINTMENT (OUTPATIENT)
Dept: LAB | Age: 62
End: 2022-08-28
Payer: COMMERCIAL

## 2022-08-28 DIAGNOSIS — E89.0 POSTOPERATIVE HYPOTHYROIDISM: ICD-10-CM

## 2022-08-28 DIAGNOSIS — E78.2 MIXED HYPERLIPIDEMIA: ICD-10-CM

## 2022-08-28 LAB
ALBUMIN SERPL BCP-MCNC: 3.5 G/DL (ref 3.5–5)
ALP SERPL-CCNC: 54 U/L (ref 46–116)
ALT SERPL W P-5'-P-CCNC: 33 U/L (ref 12–78)
ANION GAP SERPL CALCULATED.3IONS-SCNC: 5 MMOL/L (ref 4–13)
AST SERPL W P-5'-P-CCNC: 24 U/L (ref 5–45)
BASOPHILS # BLD AUTO: 0.06 THOUSANDS/ΜL (ref 0–0.1)
BASOPHILS NFR BLD AUTO: 1 % (ref 0–1)
BILIRUB SERPL-MCNC: 0.35 MG/DL (ref 0.2–1)
BUN SERPL-MCNC: 22 MG/DL (ref 5–25)
CALCIUM SERPL-MCNC: 8.8 MG/DL (ref 8.3–10.1)
CHLORIDE SERPL-SCNC: 105 MMOL/L (ref 96–108)
CHOLEST SERPL-MCNC: 231 MG/DL
CO2 SERPL-SCNC: 29 MMOL/L (ref 21–32)
CREAT SERPL-MCNC: 0.75 MG/DL (ref 0.6–1.3)
EOSINOPHIL # BLD AUTO: 0.13 THOUSAND/ΜL (ref 0–0.61)
EOSINOPHIL NFR BLD AUTO: 3 % (ref 0–6)
ERYTHROCYTE [DISTWIDTH] IN BLOOD BY AUTOMATED COUNT: 12.5 % (ref 11.6–15.1)
GFR SERPL CREATININE-BSD FRML MDRD: 86 ML/MIN/1.73SQ M
GLUCOSE P FAST SERPL-MCNC: 97 MG/DL (ref 65–99)
HCT VFR BLD AUTO: 39.4 % (ref 34.8–46.1)
HDLC SERPL-MCNC: 82 MG/DL
HGB BLD-MCNC: 12.5 G/DL (ref 11.5–15.4)
IMM GRANULOCYTES # BLD AUTO: 0.03 THOUSAND/UL (ref 0–0.2)
IMM GRANULOCYTES NFR BLD AUTO: 1 % (ref 0–2)
LDLC SERPL CALC-MCNC: 120 MG/DL (ref 0–100)
LYMPHOCYTES # BLD AUTO: 2.07 THOUSANDS/ΜL (ref 0.6–4.47)
LYMPHOCYTES NFR BLD AUTO: 42 % (ref 14–44)
MCH RBC QN AUTO: 30.6 PG (ref 26.8–34.3)
MCHC RBC AUTO-ENTMCNC: 31.7 G/DL (ref 31.4–37.4)
MCV RBC AUTO: 97 FL (ref 82–98)
MONOCYTES # BLD AUTO: 0.52 THOUSAND/ΜL (ref 0.17–1.22)
MONOCYTES NFR BLD AUTO: 11 % (ref 4–12)
NEUTROPHILS # BLD AUTO: 2.05 THOUSANDS/ΜL (ref 1.85–7.62)
NEUTS SEG NFR BLD AUTO: 42 % (ref 43–75)
NRBC BLD AUTO-RTO: 0 /100 WBCS
PLATELET # BLD AUTO: 231 THOUSANDS/UL (ref 149–390)
PMV BLD AUTO: 9.9 FL (ref 8.9–12.7)
POTASSIUM SERPL-SCNC: 4.1 MMOL/L (ref 3.5–5.3)
PROT SERPL-MCNC: 6.6 G/DL (ref 6.4–8.4)
RBC # BLD AUTO: 4.08 MILLION/UL (ref 3.81–5.12)
SODIUM SERPL-SCNC: 139 MMOL/L (ref 135–147)
T4 FREE SERPL-MCNC: 1.03 NG/DL (ref 0.76–1.46)
TRIGL SERPL-MCNC: 144 MG/DL
TSH SERPL DL<=0.05 MIU/L-ACNC: 0.22 UIU/ML (ref 0.45–4.5)
WBC # BLD AUTO: 4.86 THOUSAND/UL (ref 4.31–10.16)

## 2022-08-28 PROCEDURE — 84439 ASSAY OF FREE THYROXINE: CPT

## 2022-08-28 PROCEDURE — 85025 COMPLETE CBC W/AUTO DIFF WBC: CPT

## 2022-08-28 PROCEDURE — 80053 COMPREHEN METABOLIC PANEL: CPT

## 2022-08-28 PROCEDURE — 80061 LIPID PANEL: CPT

## 2022-08-28 PROCEDURE — 36415 COLL VENOUS BLD VENIPUNCTURE: CPT

## 2022-08-28 PROCEDURE — 84443 ASSAY THYROID STIM HORMONE: CPT

## 2022-08-29 ENCOUNTER — TELEPHONE (OUTPATIENT)
Dept: OBGYN CLINIC | Facility: CLINIC | Age: 62
End: 2022-08-29

## 2022-08-29 NOTE — TELEPHONE ENCOUNTER
Left message on patients cell regarding her question about her bill for the wrist braces  I told her that the DME is handles by [facility] and gave her their phone number

## 2022-08-30 ENCOUNTER — OFFICE VISIT (OUTPATIENT)
Dept: INTERNAL MEDICINE CLINIC | Facility: CLINIC | Age: 62
End: 2022-08-30
Payer: COMMERCIAL

## 2022-08-30 VITALS
OXYGEN SATURATION: 96 % | SYSTOLIC BLOOD PRESSURE: 102 MMHG | TEMPERATURE: 97.1 F | BODY MASS INDEX: 23.79 KG/M2 | DIASTOLIC BLOOD PRESSURE: 58 MMHG | WEIGHT: 151.6 LBS | HEART RATE: 81 BPM | RESPIRATION RATE: 16 BRPM | HEIGHT: 67 IN

## 2022-08-30 DIAGNOSIS — E89.0 POSTOPERATIVE HYPOTHYROIDISM: ICD-10-CM

## 2022-08-30 DIAGNOSIS — S62.521A DISPLACED FRACTURE OF DISTAL PHALANX OF RIGHT THUMB, INITIAL ENCOUNTER FOR CLOSED FRACTURE: ICD-10-CM

## 2022-08-30 DIAGNOSIS — J30.89 SEASONAL ALLERGIC RHINITIS DUE TO OTHER ALLERGIC TRIGGER: ICD-10-CM

## 2022-08-30 DIAGNOSIS — Z00.00 ANNUAL PHYSICAL EXAM: Primary | ICD-10-CM

## 2022-08-30 DIAGNOSIS — N95.1 SYMPTOMATIC MENOPAUSAL OR FEMALE CLIMACTERIC STATES: ICD-10-CM

## 2022-08-30 DIAGNOSIS — F51.01 PRIMARY INSOMNIA: ICD-10-CM

## 2022-08-30 PROBLEM — Z48.02 VISIT FOR SUTURE REMOVAL: Status: RESOLVED | Noted: 2022-04-16 | Resolved: 2022-08-30

## 2022-08-30 PROBLEM — D70.9 NEUTROPENIA (HCC): Status: ACTIVE | Noted: 2021-08-18

## 2022-08-30 PROBLEM — J30.9 ALLERGIC RHINITIS DUE TO ALLERGEN: Status: ACTIVE | Noted: 2022-08-30

## 2022-08-30 PROCEDURE — 3725F SCREEN DEPRESSION PERFORMED: CPT | Performed by: INTERNAL MEDICINE

## 2022-08-30 PROCEDURE — 99396 PREV VISIT EST AGE 40-64: CPT | Performed by: INTERNAL MEDICINE

## 2022-08-30 NOTE — PROGRESS NOTES
[address]    NAME: Morgan Millan  AGE: 65 y o  SEX: female  : 1960     DATE: 2022     Assessment and Plan:     Problem List Items Addressed This Visit        Endocrine    Hypothyroidism     TSH slightly low with normal T4  Clinically euthyroid  Continue current dose of recheck in October         Relevant Orders    TSH, 3rd generation with Free T4 reflex       Respiratory    Allergic rhinitis due to allergen    Relevant Medications    Beclomethasone Dipropionate 80 MCG/ACT AERS       Musculoskeletal and Integument    Displaced fracture of distal phalanx of right thumb, initial encounter for closed fracture     Likely needs surgery  Seeking a third opinion likely in [place]            Other    Insomnia     She feels like trazodone 100mg makes her feel drowsy upon waking up but will try it again  If ineffective and causing side effects, will ry temazepam  Alprazolam does not help her sleep but she takes it PRN for anxiety         Symptomatic menopausal or female climacteric states     Off HRT x 6mnths with resumption of hot flashes  She decided to stay off of it            Other Visit Diagnoses     Annual physical exam    -  Primary          Immunizations and preventive care screenings were discussed with patient today  Appropriate education was printed on patient's after visit summary  Counseling:  Exercise: the importance of regular exercise/physical activity was discussed  Recommend exercise 3-5 times per week for at least 30 minutes  Return in about 1 year (around 2023)  Chief Complaint:     Chief Complaint   Patient presents with    Physical Exam      History of Present Illness:     Adult Annual Physical   Patient here for a comprehensive physical exam  The patient reports problems - right thumb pain, stiffness; insomnia; hot flashes      Diet and Physical Activity  Diet/Nutrition: well balanced diet       Depression Screening  PHQ-2/9 Depression Screening    Little interest or pleasure in doing things: 0 - not at all  Feeling down, depressed, or hopeless: 0 - not at all  PHQ-2 Score: 0  PHQ-2 Interpretation: Negative depression screen       General Health  Sleep: sleeps poorly  Hearing: normal - bilateral   Vision: goes for regular eye exams and wears glasses  Dental: regular dental visits  /GYN Health  Patient is: postmenopausal     Review of Systems:     Review of Systems   Constitutional: Negative for chills, fever and unexpected weight change  Respiratory: Negative for shortness of breath  Cardiovascular: Negative for chest pain and palpitations  Gastrointestinal: Negative for abdominal pain, blood in stool, constipation and diarrhea  Endocrine:        Hot flashes   Genitourinary: Negative for difficulty urinating, hematuria and vaginal bleeding  Musculoskeletal: Positive for arthralgias (right thumb)  Neurological: Negative for dizziness and headaches  Psychiatric/Behavioral: Positive for sleep disturbance  Past Medical History:     Past Medical History:   Diagnosis Date    Dyslipidemia     Last assessed: 7/30/15    Exposure to SARS-associated coronavirus 6/5/2020    Visit for suture removal 4/16/2022      Past Surgical History:     Past Surgical History:   Procedure Laterality Date    ARTHROPLASTY Left 2008    with Prosthesis Trapezium  Thumb w/LRTI  Dr [place]  Last assessed: 8/1/16    TONSILLECTOMY      Last assessed: 8/1/16      Social History:     Social History     Socioeconomic History    Marital status: /Civil Union     Spouse name: None    Number of children: 2    Years of education: None    Highest education level: None   Occupational History    None   Tobacco Use    Smoking status: Never Smoker    Smokeless tobacco: Never Used   Vaping Use    Vaping Use: Never used   Substance and Sexual Activity    Alcohol use:  Yes     Alcohol/week: 7 0 standard drinks     Types: 7 Glasses of wine per week     Comment: One beverage daily 7/2015    Drug use: No    Sexual activity: Yes     Partners: Male     Birth control/protection: Post-menopausal   Other Topics Concern    None   Social History Narrative    Exercises daily     Social Determinants of Health     Financial Resource Strain: Not on file   Food Insecurity: Not on file   Transportation Needs: Not on file   Physical Activity: Not on file   Stress: Not on file   Social Connections: Not on file   Intimate Partner Violence: Not on file   Housing Stability: Not on file      Family History:     Family History   Problem Relation Age of Onset    Alzheimer's disease Mother     Hyperlipidemia Mother     Colon cancer Father     No Known Problems Daughter     No Known Problems Maternal Grandmother     No Known Problems Maternal Grandfather     No Known Problems Paternal Grandmother     No Known Problems Paternal Grandfather     No Known Problems Daughter     Pancreatic cancer Maternal Aunt     No Known Problems Maternal Aunt       Current Medications:     Current Outpatient Medications   Medication Sig Dispense Refill    ALPRAZolam (XANAX) 0 5 mg tablet TAKE 1 TO 2 TABLETS BY MOUTH AT NIGHT AS NEEDED FOR INSOMNIA 90 tablet 0    Beclomethasone Dipropionate 80 MCG/ACT AERS 2 Act (160 mcg total) into each nostril daily 10 6 g 1    Calcium Carb-Cholecalciferol (CALCIUM 500 + D PO) Take by mouth      Synthroid 137 MCG tablet TAKE ONE TABLET BY MOUTH DAILY ON AN EMPTY STOMACH 90 tablet 3    traZODone (DESYREL) 50 mg tablet TAKE 1-2 TABLET TAKE TABLET BY MOUTH NIGHTLY FOR INSOMNIA 60 tablet 0    clotrimazole-betamethasone (LOTRISONE) 1-0 05 % cream Apply topically 2 (two) times a day (Patient not taking: Reported on 8/30/2022) 90 g 1    Multiple Vitamin (MULTIVITAMIN ADULT PO) Take by mouth (Patient not taking: Reported on 8/30/2022)       No current facility-administered medications for this visit  Allergies: Allergies   Allergen Reactions    Pollen Extract       Physical Exam:     /58   Pulse 81   Temp (!) 97 1 °F (36 2 °C)   Resp 16   Ht 5' 6 5" (1 689 m)   Wt 68 8 kg (151 lb 9 6 oz)   SpO2 96%   BMI 24 10 kg/m²     Physical Exam  Constitutional:       General: She is not in acute distress  Appearance: She is well-developed  She is not ill-appearing, toxic-appearing or diaphoretic  HENT:      Head: Normocephalic and atraumatic  Right Ear: External ear normal  There is no impacted cerumen  Left Ear: External ear normal  There is no impacted cerumen  Eyes:      Conjunctiva/sclera: Conjunctivae normal    Cardiovascular:      Rate and Rhythm: Normal rate and regular rhythm  Heart sounds: Normal heart sounds  No murmur heard  Pulmonary:      Effort: Pulmonary effort is normal  No respiratory distress  Breath sounds: Normal breath sounds  No stridor  No wheezing or rales  Abdominal:      General: There is no distension  Palpations: Abdomen is soft  There is no mass  Tenderness: There is no abdominal tenderness  There is no guarding or rebound  Musculoskeletal:      Cervical back: Neck supple  Right lower leg: No edema  Left lower leg: No edema  Skin:     General: Skin is warm and dry  Neurological:      Mental Status: She is alert and oriented to person, place, and time  Psychiatric:         Mood and Affect: Mood normal          Behavior: Behavior normal          Thought Content:  Thought content normal          Judgment: Judgment normal           Anais Hung MD  [facility]

## 2022-08-30 NOTE — ASSESSMENT & PLAN NOTE
She feels like trazodone 100mg makes her feel drowsy upon waking up but will try it again  If ineffective and causing side effects, will ry temazepam  Alprazolam does not help her sleep but she takes it PRN for anxiety

## 2022-08-30 NOTE — PATIENT INSTRUCTIONS

## 2022-08-30 NOTE — ASSESSMENT & PLAN NOTE
Likely needs surgery  Seeking a third opinion likely in [facility]

## 2022-09-12 ENCOUNTER — TELEPHONE (OUTPATIENT)
Dept: INTERNAL MEDICINE CLINIC | Facility: CLINIC | Age: 62
End: 2022-09-12

## 2022-09-12 NOTE — TELEPHONE ENCOUNTER
Patient called stating she needs  A prior authorization for her nasal spray  Can you please start the process  She has been waiting one week  [employer] stated that they sent us a notice    The spray is  Beclomethasone Dipropionate 80 mcg  Please advise    Thank You

## 2022-09-12 NOTE — TELEPHONE ENCOUNTER
Dr Mulu Farris- DO you want a PA started for this or do you want to send something else that does not require a PA? Denial is under the media to see what is covered

## 2022-09-12 NOTE — TELEPHONE ENCOUNTER
This (see below) was my response to receiving the denial and Leo Holding left her a message  We can start a PA but likely not going to be approved unless I can say that she has not tolerated mometasone for example which is a generic Nasonex  Has she had side effects of problems with other nasal sprays?      ----- Message -----   From: Shamir Borden   Sent: 9/7/2022   9:37 AM EDT   To: [facility] on  for patient to  to let us know if she wants to switch or pay out of pocket      ----- Message -----   From: Alistair Sumner MD   Sent: 9/6/2022  10:58 PM EDT   To: [address] [place] [address] René     Please inform patient that Qnasl is not covered  Generic version of Nasonex (mometasone)  is covered   Does she want to pay for the Qnasl out of pocket or switch ?     ----- Message -----   From: Shamir Borden   Sent: 9/6/2022   7:37 AM EDT   To: Alistair Sumner MD       ----- Message -----   From: Interface, Transcription Incoming   Sent: 9/6/2022  12:26 AM EDT   To: [address] [place] [address]

## 2022-09-13 DIAGNOSIS — E03.9 HYPOTHYROIDISM, UNSPECIFIED TYPE: ICD-10-CM

## 2022-09-13 RX ORDER — LEVOTHYROXINE SODIUM 137 MCG
TABLET ORAL
Qty: 90 TABLET | Refills: 3 | Status: SHIPPED | OUTPATIENT
Start: 2022-09-13 | End: 2022-10-11

## 2022-09-13 NOTE — TELEPHONE ENCOUNTER
Spoke with patient who could not give this nurse names of medications tried, however states she has tried them all in the past and this medication is the only one that works  Patient wants prior auth to be sent in, informed patient that this nurse would  Prior auth sent in via [facility], waiting for determintation

## 2022-09-15 NOTE — TELEPHONE ENCOUNTER
Prior Kennedy Pearce has been approved, LM on vm informing patient of this information and to call back if any questions

## 2022-09-29 DIAGNOSIS — F51.01 PRIMARY INSOMNIA: ICD-10-CM

## 2022-09-29 RX ORDER — TRAZODONE HYDROCHLORIDE 50 MG/1
TABLET ORAL
Qty: 60 TABLET | Refills: 0 | Status: SHIPPED | OUTPATIENT
Start: 2022-09-29

## 2022-10-05 ENCOUNTER — TELEPHONE (OUTPATIENT)
Dept: INTERNAL MEDICINE CLINIC | Facility: CLINIC | Age: 62
End: 2022-10-05

## 2022-10-05 ENCOUNTER — APPOINTMENT (OUTPATIENT)
Dept: LAB | Age: 62
End: 2022-10-05
Payer: COMMERCIAL

## 2022-10-05 DIAGNOSIS — E89.0 POSTOPERATIVE HYPOTHYROIDISM: ICD-10-CM

## 2022-10-05 LAB
T4 FREE SERPL-MCNC: 1.31 NG/DL (ref 0.76–1.46)
TSH SERPL DL<=0.05 MIU/L-ACNC: 0.07 UIU/ML (ref 0.45–4.5)

## 2022-10-05 PROCEDURE — 84439 ASSAY OF FREE THYROXINE: CPT

## 2022-10-05 PROCEDURE — 84443 ASSAY THYROID STIM HORMONE: CPT

## 2022-10-05 PROCEDURE — 36415 COLL VENOUS BLD VENIPUNCTURE: CPT

## 2022-10-05 NOTE — TELEPHONE ENCOUNTER
Patient is having facial pain that comes and goes  She was to the dentist today and does state it is not related to that  Patient is requesting an appointment but only wants to see you  I did offer an appointment with the NP tomorrow but patient refused          Please advise

## 2022-10-06 NOTE — TELEPHONE ENCOUNTER
Patient called back stating she is unable to leave work tomorrow due to staffing  Asking if there is another time/day that you can see her next week          Please advise

## 2022-10-11 ENCOUNTER — OFFICE VISIT (OUTPATIENT)
Dept: INTERNAL MEDICINE CLINIC | Facility: CLINIC | Age: 62
End: 2022-10-11
Payer: COMMERCIAL

## 2022-10-11 ENCOUNTER — APPOINTMENT (OUTPATIENT)
Dept: LAB | Age: 62
End: 2022-10-11
Payer: COMMERCIAL

## 2022-10-11 VITALS
SYSTOLIC BLOOD PRESSURE: 104 MMHG | WEIGHT: 153 LBS | DIASTOLIC BLOOD PRESSURE: 62 MMHG | HEIGHT: 67 IN | HEART RATE: 79 BPM | BODY MASS INDEX: 24.01 KG/M2 | OXYGEN SATURATION: 97 %

## 2022-10-11 DIAGNOSIS — R51.9 FACIAL PAIN: ICD-10-CM

## 2022-10-11 DIAGNOSIS — R51.9 FACIAL PAIN: Primary | ICD-10-CM

## 2022-10-11 DIAGNOSIS — E89.0 POSTOPERATIVE HYPOTHYROIDISM: ICD-10-CM

## 2022-10-11 LAB
CRP SERPL QL: <3 MG/L
ERYTHROCYTE [SEDIMENTATION RATE] IN BLOOD: 4 MM/HOUR (ref 0–29)

## 2022-10-11 PROCEDURE — 85652 RBC SED RATE AUTOMATED: CPT

## 2022-10-11 PROCEDURE — 36415 COLL VENOUS BLD VENIPUNCTURE: CPT

## 2022-10-11 PROCEDURE — 86140 C-REACTIVE PROTEIN: CPT

## 2022-10-11 PROCEDURE — 99214 OFFICE O/P EST MOD 30 MIN: CPT | Performed by: INTERNAL MEDICINE

## 2022-10-11 RX ORDER — LEVOTHYROXINE SODIUM 0.12 MG/1
125 TABLET ORAL
Qty: 90 TABLET | Refills: 3 | Status: SHIPPED | OUTPATIENT
Start: 2022-10-11 | End: 2022-10-11

## 2022-10-11 RX ORDER — LEVOTHYROXINE SODIUM 125 MCG
125 TABLET ORAL DAILY
Qty: 90 TABLET | Refills: 3 | Status: SHIPPED | OUTPATIENT
Start: 2022-10-11

## 2022-10-11 NOTE — PATIENT INSTRUCTIONS
Trigeminal Neuralgia   WHAT YOU NEED TO KNOW:   What is trigeminal neuralgia (TN)?  TN is a nerve disorder that causes sudden attacks of severe facial pain  You have a trigeminal nerve on each side of your face  The nerves allow you to feel pain, touch, and temperature changes in different areas of your face  What causes TN? The exact cause of your TN may not be known  The following may increase your risk:  Pressure from blood vessels in the head    Pressure from a tumor or cyst    Injury to the nerve from head trauma, surgery, or a stroke    Damage from other diseases, such as multiple sclerosis (MS)    What can trigger a pain attack? Most attacks are brought on by touching a trigger area on your face, such as from any of the following:  Eating or drinking    Smiling, yawning, or talking    Shaving or washing your face    Putting on makeup or combing your hair    Wind or temperature changes    Noise or lights    What are the signs and symptoms of TN? TN causes sudden, sharp, or burning pain  It normally occurs on one side of your face but can occur on both sides  You can go days to years without any TN attacks  You may have any of the following:  Pain attacks that last from 1 second up to 2 minutes and repeat every few minutes to hours    Pain attacks that get worse over time    Pain that is so severe you cannot eat, drink, or speak    Spasms in your facial muscles during your pain attack    How is TN diagnosed? Your healthcare provider will ask about your symptoms and when they started  Tell him or her if you have a family history of TN  Your provider will look at your head, neck, mouth, jaws, and teeth  A CT scan, MRI, or MRA pictures may be used to check bones, muscles, or blood vessels  You may be given contrast liquid to help the bones, muscles, or blood vessels show up better in the pictures  Tell the healthcare provider if you have ever had an allergic reaction to contrast liquid   Do not enter the MRI room with anything metal  Metal can cause serious injury  Tell the healthcare provider if you have any metal in or on your body  How is TN treated? TN may go away on its own without treatment  If your TN is caused by another condition, your healthcare provider will also treat that condition  Medicines  may be given to relieve or prevent your symptoms  A procedure  may be used to destroy an area deep in your skull where nerve branches come together  A needle and tube are put through the base of your skull to reach this area  The nerve impulses that cause your pain attacks may be blocked  The nerve may be destroyed with medicine injections or may be frozen  Part of the nerve may also be removed  Medicine may be injected to make you lose feeling in the painful area  Surgery  may be needed if other treatment does not help  Surgery may be used to separate the trigeminal nerve from the blood vessel pressing on it  The blood vessel may also be removed  How can I help manage TN? Keep your medicines nearby  Even if you have not had TN symptoms for a long time, keep your medicine nearby  If your symptoms return, contact your healthcare provider before you start taking your medicines again  Take your medicines as directed  Do not stop taking your medicines without talking with your healthcare provider first  Amanda Garcia may have a bad reaction if you stop suddenly  Where can I find more information? Trigeminal Neuralgia Association  [address] , [facility]  Phone: [phone]  Web Address: www tna-support  org    Call your local emergency number (911 in the [address] [facility]) if:   You are feeling so depressed you want to harm yourself  You are confused and cannot think clearly  You have sudden dizziness, or problems with movement, weakness, or numbness in your face  When should I seek immediate care? You have a fever, stiff neck, or develop a rash or peeling skin      You are not eating or drinking, and you are losing weight  You have eye pain, eye numbness, or sudden vision or hearing changes  When should I call my doctor? The medicines you are taking are not decreasing your symptoms  You feel worried or depressed and find it hard to do your daily activities  You have headaches, mouth sores, an upset stomach, or diarrhea  Your TN pain feels worse, different, or moves to another area of your face  CARE AGREEMENT:   You have the right to help plan your care  Learn about your health condition and how it may be treated  Discuss treatment options with your healthcare providers to decide what care you want to receive  You always have the right to refuse treatment  The above information is an  only  It is not intended as medical advice for individual conditions or treatments  Talk to your doctor, nurse or pharmacist before following any medical regimen to see if it is safe and effective for you  © Copyright [employer] 2022 Information is for End User's use only and may not be sold, redistributed or otherwise used for commercial purposes   All illustrations and images included in CareNotes® are the copyrighted property of A D A M , Inc  or [address] [place] [address]

## 2022-10-11 NOTE — PROGRESS NOTES
Assessment/Plan:  Trigeminal neuralgia vs GCA vs muscular from clenching the teeth   R/O GCA with ESR and CRP  She has no rash and is vaccinated for shingles so shingles much less likely  Discussed obtaining an MRI for TN and treatment with carbamazepine  Reduce Synthroid and recheck TSH in a month       Problem List Items Addressed This Visit        Endocrine    Hypothyroidism    Relevant Medications    Synthroid 125 MCG tablet    Other Relevant Orders    TSH, 3rd generation with Free T4 reflex      Other Visit Diagnoses     Facial pain    -  Primary    Relevant Orders    Sedimentation rate, automated    C-reactive protein            Subjective:      Patient ID: Ab Pope is a 65 y o  female  HPI  3 weeks of intermittent pain on the face, right >left  Initially with a sore throat, ear pain that were mild and resolved  She saw her dentist and was told it is not dental   She was told it can be a type of myalgia or from clenching her teeth  The pain is brief and described as soreness can be 7/10 lasts a few minutes  Denies fever chills eye or nasal discharge change in vision  Takes ibuprofen PRN which some relief    Recent labs reviewed and TSH is lower with high normal T4    The following portions of the patient's history were reviewed and updated as appropriate: allergies, current medications, past family history, past medical history, past social history, past surgical history and problem list     Review of Systems   Constitutional: Negative for chills and fever  HENT: Negative for congestion, rhinorrhea, sore throat and trouble swallowing  Eyes: Negative for discharge and visual disturbance  Respiratory: Negative for cough  Skin: Negative for rash  Neurological: Negative for dizziness, weakness, numbness and headaches  Psychiatric/Behavioral: Negative for sleep disturbance           Objective:      /62   Pulse 79   Ht 5' 6 5" (1 689 m)   Wt 69 4 kg (153 lb)   SpO2 97%   BMI 24 32 kg/m²          Physical Exam  Constitutional:       General: She is not in acute distress  Appearance: She is not ill-appearing, toxic-appearing or diaphoretic  HENT:      Right Ear: External ear normal  There is no impacted cerumen  Left Ear: External ear normal  There is no impacted cerumen  Mouth/Throat:      Pharynx: No oropharyngeal exudate or posterior oropharyngeal erythema  Eyes:      Conjunctiva/sclera: Conjunctivae normal    Cardiovascular:      Rate and Rhythm: Regular rhythm  Heart sounds: Normal heart sounds  Pulmonary:      Breath sounds: Normal breath sounds  Abdominal:      Palpations: Abdomen is soft  Tenderness: There is no abdominal tenderness  Musculoskeletal:      Cervical back: Neck supple  Skin:     Findings: No rash  Neurological:      Mental Status: She is oriented to person, place, and time  Cranial Nerves: No facial asymmetry  Comments: No tenderness over the temples , trigeminal nerve distribution  Sensation over trigeminal nerve distribution is intact bilaterally     Psychiatric:         Mood and Affect: Mood normal          Behavior: Behavior normal          Thought Content:  Thought content normal          Judgment: Judgment normal

## 2022-11-25 ENCOUNTER — TELEMEDICINE (OUTPATIENT)
Dept: INTERNAL MEDICINE CLINIC | Facility: CLINIC | Age: 62
End: 2022-11-25

## 2022-11-25 ENCOUNTER — NURSE TRIAGE (OUTPATIENT)
Dept: OTHER | Facility: OTHER | Age: 62
End: 2022-11-25

## 2022-11-25 DIAGNOSIS — J01.00 ACUTE NON-RECURRENT MAXILLARY SINUSITIS: Primary | ICD-10-CM

## 2022-11-25 RX ORDER — AMOXICILLIN AND CLAVULANATE POTASSIUM 875; 125 MG/1; MG/1
1 TABLET, FILM COATED ORAL EVERY 12 HOURS SCHEDULED
Qty: 14 TABLET | Refills: 0 | Status: SHIPPED | OUTPATIENT
Start: 2022-11-25 | End: 2022-12-02

## 2022-11-25 NOTE — TELEPHONE ENCOUNTER
Reason for Disposition  • Caller has already spoken with the PCP (or office), and has no further questions    Protocols used: NO CONTACT OR DUPLICATE CONTACT CALL-ADULT-[place]

## 2022-11-25 NOTE — PROGRESS NOTES
Virtual Regular Visit    Verification of patient location:    Patient is located in the following state in which I hold an active license [place]      Assessment/Plan:    Problem List Items Addressed This Visit    None  Visit Diagnoses     Acute non-recurrent maxillary sinusitis    -  Primary    Relevant Medications    amoxicillin-clavulanate (AUGMENTIN) 875-125 mg per tablet        The case discussed with patient using patient centered shared decision making  The patient was counseled regarding instructions for management,-- risk factor reductions,-- prognosis,-- impressions,-- risks and benefits of treatment options,-- importance of compliance with treatment  I have reviewed the instructions with the patient, answering all questions to her satisfaction  Cont supportive care measures  F/u if not better in 1 week       Reason for visit is   Chief Complaint   Patient presents with   • Virtual Brief Visit   • Virtual Regular Visit        Encounter provider Joanne [facility]    Provider located at [address] [place] [id number]      Recent Visits  No visits were found meeting these conditions  Showing recent visits within past 7 days and meeting all other requirements  Today's Visits  Date Type Provider Dept   11/25/22 Telemedicine Mariann Bernstein today's visits and meeting all other requirements  Future Appointments  No visits were found meeting these conditions  Showing future appointments within next 150 days and meeting all other requirements       The patient was identified by name and date of birth  Rolan Cordon was informed that this is a telemedicine visit and that the visit is being conducted through the [address] [employer] platform  She agrees to proceed     My office door was closed  No one else was in the room  She acknowledged consent and understanding of privacy and security of the video platform   The patient has agreed to participate and understands they can discontinue the visit at any time  Patient is aware this is a billable service  Subjective  Merian Fabry is a 65 y o  female presents with sinus infection symptoms x 8 days   Sinusitis  This is a new (covid testing neg x 2) problem  Episode onset: 8 days  The problem has been gradually worsening since onset  There has been no fever  Associated symptoms include congestion, coughing, headaches, sinus pressure and a sore throat  Pertinent negatives include no shortness of breath  Treatments tried: dayquil, nyquil, nasal spray  The treatment provided no relief  Past Medical History:   Diagnosis Date   • Dyslipidemia     Last assessed: 7/30/15   • Exposure to SARS-associated coronavirus 6/5/2020   • Visit for suture removal 4/16/2022       Past Surgical History:   Procedure Laterality Date   • ARTHROPLASTY Left 2008    with Prosthesis Trapezium  Thumb w/LRTI  Dr Willis Sanderson   Last assessed: 8/1/16   • TONSILLECTOMY      Last assessed: 8/1/16       Current Outpatient Medications   Medication Sig Dispense Refill   • ALPRAZolam (XANAX) 0 5 mg tablet Take 1 tablet (0 5 mg total) by mouth daily at bedtime as needed for sleep 90 tablet 0   • amoxicillin-clavulanate (AUGMENTIN) 875-125 mg per tablet Take 1 tablet by mouth every 12 (twelve) hours for 7 days 14 tablet 0   • Beclomethasone Dipropionate 80 MCG/ACT AERS 2 Act (160 mcg total) into each nostril daily 10 6 g 1   • clotrimazole-betamethasone (LOTRISONE) 1-0 05 % cream Apply topically 2 (two) times a day 90 g 1   • Synthroid 125 MCG tablet Take 1 tablet (125 mcg total) by mouth daily 90 tablet 3   • traZODone (DESYREL) 50 mg tablet TAKE 1-2 TABLETS BY MOUTH NIGHTLY FOR INSOMNIA 60 tablet 0   • Calcium Carb-Cholecalciferol (CALCIUM 500 + D PO) Take by mouth (Patient not taking: Reported on 10/11/2022)     • Multiple Vitamin (MULTIVITAMIN ADULT PO) Take by mouth (Patient not taking: Reported on 8/30/2022)       No current facility-administered medications for this visit  Allergies   Allergen Reactions   • Pollen Extract        Review of Systems   Constitutional: Positive for fatigue  Negative for fever  HENT: Positive for congestion, postnasal drip, sinus pressure and sore throat  Nasal discharge brown   Respiratory: Positive for cough  Negative for shortness of breath  Cardiovascular: Negative for chest pain  Neurological: Positive for headaches  Negative for dizziness and weakness         Video Exam    Vitals:       Physical Exam     I spent 10 minutes directly with the patient during this visit

## 2022-11-25 NOTE — TELEPHONE ENCOUNTER
Regarding: Sore throat / cough  ----- Message from Janeth Crook RN sent at 11/25/2022  8:53 AM EST -----  "I have a sore throat for 9 days   I have a cough and congestion "

## 2022-11-27 DIAGNOSIS — F51.01 PRIMARY INSOMNIA: ICD-10-CM

## 2022-11-27 RX ORDER — TRAZODONE HYDROCHLORIDE 50 MG/1
TABLET ORAL
Qty: 60 TABLET | Refills: 0 | Status: SHIPPED | OUTPATIENT
Start: 2022-11-27

## 2022-11-29 ENCOUNTER — NURSE TRIAGE (OUTPATIENT)
Dept: OTHER | Facility: OTHER | Age: 62
End: 2022-11-29

## 2022-11-29 ENCOUNTER — APPOINTMENT (OUTPATIENT)
Dept: RADIOLOGY | Age: 62
End: 2022-11-29

## 2022-11-29 ENCOUNTER — OFFICE VISIT (OUTPATIENT)
Dept: INTERNAL MEDICINE CLINIC | Facility: CLINIC | Age: 62
End: 2022-11-29

## 2022-11-29 VITALS — BODY MASS INDEX: 24.04 KG/M2 | OXYGEN SATURATION: 96 % | WEIGHT: 151.2 LBS | HEART RATE: 91 BPM | TEMPERATURE: 99 F

## 2022-11-29 DIAGNOSIS — J20.9 ACUTE BRONCHITIS, UNSPECIFIED ORGANISM: ICD-10-CM

## 2022-11-29 DIAGNOSIS — J20.9 ACUTE BRONCHITIS, UNSPECIFIED ORGANISM: Primary | ICD-10-CM

## 2022-11-29 RX ORDER — HYDROCODONE POLISTIREX AND CHLORPHENIRAMINE POLISTIREX 10; 8 MG/5ML; MG/5ML
5 SUSPENSION, EXTENDED RELEASE ORAL EVERY 12 HOURS PRN
Qty: 120 ML | Refills: 0 | Status: SHIPPED | OUTPATIENT
Start: 2022-11-29

## 2022-11-29 RX ORDER — AZITHROMYCIN 250 MG/1
TABLET, FILM COATED ORAL
Qty: 6 TABLET | Refills: 0 | Status: SHIPPED | OUTPATIENT
Start: 2022-11-29 | End: 2022-12-03

## 2022-11-29 RX ORDER — ALBUTEROL SULFATE 90 UG/1
2 AEROSOL, METERED RESPIRATORY (INHALATION) EVERY 6 HOURS PRN
Qty: 8.5 G | Refills: 0 | Status: SHIPPED | OUTPATIENT
Start: 2022-11-29

## 2022-11-29 NOTE — PROGRESS NOTES
Assessment/Plan:  Add Zpack to Augmentin  Obtain CXR  COVID /flu swab obtained today-symptoms >2 weeks but worse now with fever so possible new infection  Call if not improving     Problem List Items Addressed This Visit    None  Visit Diagnoses     Acute bronchitis, unspecified organism    -  Primary    Relevant Medications    azithromycin (ZITHROMAX) 250 mg tablet    hydrocodone-chlorpheniramine polistirex (TUSSIONEX) 10-8 mg/5 mL ER suspension    albuterol (ProAir HFA) 90 mcg/act inhaler    Other Relevant Orders    Covid/Flu- Office Collect    XR chest pa & lateral            Subjective:      Patient ID: No Gonzalez is a 65 y o  female  HPI  > 2 weeks ago started with a sore throat nasal congestion and cough  She was seen 4 days ago and placed on Augmentin  Sore throat improved initially  She continues to have a bad productive cough  Feels febrile today  She is taking NSAIDs, Dayquil, Nyquil    The following portions of the patient's history were reviewed and updated as appropriate: allergies, current medications, past family history, past medical history, past social history, past surgical history and problem list     Review of Systems   Constitutional: Positive for fever  HENT: Positive for congestion and sore throat  Respiratory: Positive for cough and shortness of breath  Negative for chest tightness and wheezing  Cardiovascular: Negative for chest pain and palpitations  Objective:      Pulse 91   Temp 99 °F (37 2 °C) (Tympanic)   Wt 68 6 kg (151 lb 3 2 oz)   SpO2 96%   BMI 24 04 kg/m²          Physical Exam  Constitutional:       General: She is not in acute distress  Appearance: She is not ill-appearing, toxic-appearing or diaphoretic  HENT:      Mouth/Throat:      Pharynx: Oropharynx is clear  Cardiovascular:      Rate and Rhythm: Regular rhythm  Heart sounds: Normal heart sounds  Pulmonary:      Breath sounds: Examination of the right-lower field reveals rales  Rales present  Comments: +cough  Musculoskeletal:      Cervical back: Neck supple

## 2022-11-29 NOTE — TELEPHONE ENCOUNTER
Regarding: sinus infection  ----- Message from Urszula Bruno RN sent at 11/29/2022  9:06 AM EST -----  "I have been on an antibiotic since Friday   I am still not feeling better "

## 2022-11-29 NOTE — TELEPHONE ENCOUNTER
Patient called in to report no improvement with sinus infection post start of antibiotics on 11/25/22  Reports cough, nasal congestion, sore throat, and chest is sore from coughing  Taking DayQuil, NyQuil, and cough drops along with antibiotic  Patient wants to be seen  PCP unavailable  OV scheduled with MD AMY at 2:15 PM today  Reason for Disposition  • Taking antibiotic > 72 hours (3 days) and sinus pain not improved    Answer Assessment - Initial Assessment Questions  1  ANTIBIOTIC: "What antibiotic are you receiving?" "How many times per day?"      Augmentin 875-125 mg every 12 hours for 7 days  2  ONSET: "When was the antibiotic started?"      11/25/22  3  PAIN: "How bad is the sinus pain?"   (Scale 1-10; mild, moderate or severe)    - MILD (1-3): doesn't interfere with normal activities     - MODERATE (4-7): interferes with normal activities (e g , work or school) or awakens from sleep    - SEVERE (8-10): excruciating pain and patient unable to do any normal activities         Denies  4  FEVER: "Do you have a fever?" If Yes, ask: "What is it, how was it measured, and when did it start?"       Denies  5  SYMPTOMS: "Are there any other symptoms you're concerned about?" If Yes, ask: "When did it start?"     Cough, sore throat, congestion; chest sore from coughing (Dayquil, nyquil, cough drops)  6   PREGNANCY: "Is there any chance you are pregnant?" "When was your last menstrual period?"      Denies    Protocols used: SINUS INFECTION ON ANTIBIOTIC FOLLOW-UP CALL-ADULT-[place]

## 2022-11-30 LAB
FLUAV RNA RESP QL NAA+PROBE: NEGATIVE
FLUBV RNA RESP QL NAA+PROBE: NEGATIVE
SARS-COV-2 RNA RESP QL NAA+PROBE: NEGATIVE

## 2022-12-01 ENCOUNTER — TELEPHONE (OUTPATIENT)
Dept: INTERNAL MEDICINE CLINIC | Facility: CLINIC | Age: 62
End: 2022-12-01

## 2022-12-11 DIAGNOSIS — J20.9 ACUTE BRONCHITIS, UNSPECIFIED ORGANISM: Primary | ICD-10-CM

## 2022-12-13 ENCOUNTER — APPOINTMENT (OUTPATIENT)
Dept: RADIOLOGY | Age: 62
End: 2022-12-13

## 2022-12-13 DIAGNOSIS — J20.9 ACUTE BRONCHITIS, UNSPECIFIED ORGANISM: ICD-10-CM

## 2022-12-14 DIAGNOSIS — J20.9 ACUTE BRONCHITIS, UNSPECIFIED ORGANISM: Primary | ICD-10-CM

## 2022-12-14 RX ORDER — PREDNISONE 20 MG/1
20 TABLET ORAL 2 TIMES DAILY WITH MEALS
Qty: 10 TABLET | Refills: 0 | Status: SHIPPED | OUTPATIENT
Start: 2022-12-14 | End: 2022-12-19

## 2022-12-15 DIAGNOSIS — R93.89 ABNORMAL CHEST X-RAY: Primary | ICD-10-CM

## 2022-12-18 NOTE — PROGRESS NOTES
Assessment/Plan:  Calcium 1200-1500mg + 600-1000 IU Vit D daily  Exercise 150 minutes per week minimum including weight bearing exercises  Pap with high risk HPV Q 5 years, if normal   Due 2024  Annual mammogram ordered and monthly breast self exam recommended  Colonoscopy-  Due 2026        Kegels 20 times twice daily  Silicone based lubricant with sex  (Use water based lubricant with condoms or sexual toys )    Vaginal moisturizers twice weekly as needed  Return to office for vulvar biopsy as discussed  Apt given for next week as requested   Return to office in one year or sooner, if needed  1  Encntr for gyn exam (general) (routine) w abnormal findings    2  Encounter for screening mammogram for breast cancer  -     Mammo screening bilateral w 3d & cad; Future    3  Labia minora agglutination               Subjective:      Patient ID: Yasmany Rutherford is a 65 y o  female  HPI    Yasmany Rutherford is a 65 y o  [id number] (29 yo girl-had a boy 3 months, 26 yo giril)  female who is here today for her annual visit  No gynecologic health concerns  History of premature menopause at age 33 - was told likely related to her thyroid  Her daughters were/are both also struggling with premature ovarian failure  Exercise- 5 times per week  Yasmany Rutherford is sexually active with male partner/  of 29 years  Monogamous and feels safe in this relationship  Denies vaginal pain,bleeding with some dryness  No lubricants used  She is not interested in STD screening today  She denies vaginal discharge  or pelvic pain  Does have an intermittent external vulvar itch for which she uses lotrison cream  No biopsy proven skin conditions  Does not use the medication as prescribed  She has no urinary concerns, does not have incontinence  No bowel concerns  No breast concerns       Last pap:8/19/19 normal with negative HR HPV   DEXA scan: 5/26/22 osteopenia  Mammogram: 2/10/22 normal with dense breast tissue; [facility] 6 06%  Colonoscopy: 6/1/21 polyp with 5 year recall    The following portions of the patient's history were reviewed and updated as appropriate: allergies, current medications, past family history, past medical history, past social history, past surgical history and problem list     Review of Systems   Constitutional: Negative  Negative for activity change, appetite change, chills, diaphoresis, fatigue, fever and unexpected weight change  HENT: Negative for congestion, dental problem, sneezing, sore throat and trouble swallowing  Eyes: Negative for visual disturbance  Respiratory: Negative for chest tightness and shortness of breath  Cardiovascular: Negative for chest pain and leg swelling  Gastrointestinal: Negative for abdominal pain, constipation, diarrhea, nausea and vomiting  Genitourinary: Negative for difficulty urinating, dyspareunia, dysuria, frequency, hematuria, pelvic pain, urgency, vaginal bleeding, vaginal discharge and vaginal pain  Vulvar itching  Vaginal dryness   Musculoskeletal: Negative for back pain and neck pain  Skin: Negative  Allergic/Immunologic: Negative  Neurological: Negative for weakness and headaches  Hematological: Negative for adenopathy  Psychiatric/Behavioral: Negative  Objective:      /70 (BP Location: Left arm, Patient Position: Sitting, Cuff Size: Standard)   Ht 5' 5 25" (1 657 m)   Wt 69 2 kg (152 lb 9 6 oz)   BMI 25 20 kg/m²          Physical Exam  Vitals and nursing note reviewed  Constitutional:       Appearance: Normal appearance  She is well-developed and normal weight  HENT:      Head: Normocephalic  Neck:      Thyroid: No thyromegaly  Cardiovascular:      Rate and Rhythm: Normal rate and regular rhythm  Heart sounds: Normal heart sounds  Pulmonary:      Effort: Pulmonary effort is normal       Breath sounds: Normal breath sounds     Chest:   Breasts:     Breasts are symmetrical       Right: Normal  No inverted nipple, mass, nipple discharge, skin change or tenderness  Left: Normal  No inverted nipple, mass, nipple discharge, skin change or tenderness  Abdominal:      Palpations: Abdomen is soft  Genitourinary:     General: Normal vulva  Exam position: Lithotomy position  Labia:         Right: No rash, tenderness, lesion or injury  Left: No rash, tenderness, lesion or injury  Urethra: No prolapse, urethral pain, urethral swelling or urethral lesion  Vagina: No signs of injury and foreign body  No vaginal discharge, erythema, tenderness, bleeding, lesions or prolapsed vaginal walls  Cervix: Normal       Uterus: Normal        Adnexa: Right adnexa normal and left adnexa normal         Right: No mass, tenderness or fullness  Left: No mass, tenderness or fullness  Rectum: No external hemorrhoid  Comments: Vulvovaginal atrophy  Hypopigmentation noted as marked on diagram   Agglutination of labia minora (left)   Musculoskeletal:         General: Normal range of motion  Cervical back: Normal range of motion  Lymphadenopathy:      Head:      Right side of head: No submental, submandibular, tonsillar or occipital adenopathy  Left side of head: No submental, submandibular, tonsillar or occipital adenopathy  Upper Body:      Right upper body: No supraclavicular or axillary adenopathy  Left upper body: No supraclavicular or axillary adenopathy  Lower Body: No right inguinal adenopathy  No left inguinal adenopathy  Skin:     General: Skin is warm and dry  Neurological:      Mental Status: She is alert and oriented to person, place, and time  Psychiatric:         Mood and Affect: Mood normal          Behavior: Behavior normal  Behavior is cooperative

## 2022-12-19 ENCOUNTER — ANNUAL EXAM (OUTPATIENT)
Dept: OBGYN CLINIC | Facility: CLINIC | Age: 62
End: 2022-12-19

## 2022-12-19 VITALS
SYSTOLIC BLOOD PRESSURE: 118 MMHG | HEIGHT: 65 IN | DIASTOLIC BLOOD PRESSURE: 70 MMHG | BODY MASS INDEX: 25.43 KG/M2 | WEIGHT: 152.6 LBS

## 2022-12-19 DIAGNOSIS — Z12.31 ENCOUNTER FOR SCREENING MAMMOGRAM FOR BREAST CANCER: ICD-10-CM

## 2022-12-19 DIAGNOSIS — Q52.5 LABIA MINORA AGGLUTINATION: ICD-10-CM

## 2022-12-19 DIAGNOSIS — Z01.411 ENCNTR FOR GYN EXAM (GENERAL) (ROUTINE) W ABNORMAL FINDINGS: Primary | ICD-10-CM

## 2022-12-19 NOTE — PATIENT INSTRUCTIONS
Calcium 1200-1500mg + 600-1000 IU Vit D daily  Exercise 150 minutes per week minimum including weight bearing exercises  Pap with high risk HPV Q 5 years, if normal   Due 2024  Annual mammogram ordered and monthly breast self exam recommended  Colonoscopy-  Due 2026        Kegels 20 times twice daily  Silicone based lubricant with sex  (Use water based lubricant with condoms or sexual toys )    Vaginal moisturizers twice weekly as needed  Return to office for vulvar biopsy as discussed  Apt given for next week as requested   Return to office in one year or sooner, if needed

## 2022-12-28 ENCOUNTER — PROCEDURE VISIT (OUTPATIENT)
Dept: OBGYN CLINIC | Facility: MEDICAL CENTER | Age: 62
End: 2022-12-28

## 2022-12-28 VITALS
WEIGHT: 154 LBS | DIASTOLIC BLOOD PRESSURE: 76 MMHG | BODY MASS INDEX: 25.66 KG/M2 | SYSTOLIC BLOOD PRESSURE: 108 MMHG | HEIGHT: 65 IN

## 2022-12-28 DIAGNOSIS — L29.2 VULVAR ITCHING: ICD-10-CM

## 2022-12-28 DIAGNOSIS — Q52.5 LABIA MINORA AGGLUTINATION: Primary | ICD-10-CM

## 2022-12-28 NOTE — PATIENT INSTRUCTIONS
Biopsy care discussed (keep surgical site clean and dry  Dry sterile dressing applied and extras given  Use antibacterial soap to clean once daily  Can use antibiotic ointment to area 1-2 times daily  Call office with sudden increase in pain, bleeding, malodorous discharge  No sex until healed and avoid rubbing  Trista Roy instructed to call with signs or symptoms of infection and to follow up as instructed

## 2023-01-08 PROBLEM — N90.4 LICHEN SCLEROSUS OF VULVA: Status: ACTIVE | Noted: 2023-01-08

## 2023-01-09 ENCOUNTER — OFFICE VISIT (OUTPATIENT)
Dept: OBGYN CLINIC | Facility: CLINIC | Age: 63
End: 2023-01-09

## 2023-01-09 VITALS
WEIGHT: 152 LBS | HEIGHT: 65 IN | DIASTOLIC BLOOD PRESSURE: 60 MMHG | SYSTOLIC BLOOD PRESSURE: 96 MMHG | BODY MASS INDEX: 25.33 KG/M2

## 2023-01-09 DIAGNOSIS — N90.4 LICHEN SCLEROSUS OF VULVA: Primary | ICD-10-CM

## 2023-01-09 RX ORDER — CLOBETASOL PROPIONATE 0.5 MG/G
OINTMENT TOPICAL
Qty: 45 G | Refills: 0 | Status: SHIPPED | OUTPATIENT
Start: 2023-01-09

## 2023-01-09 NOTE — PATIENT INSTRUCTIONS
Eliminate triggers that cause the irritation  Wear loose underwear, keep nails short to avoid trauma from scratching  Avoid nighttime scratching  If unable to avoid nighttime scratching, call office for nighttime sedation  Any signs of infection return to office  Clobetasol rx sent to pharmacy on file  Use clobetasol sparingly twice daily until skin texture normalizes which may take 2-4 months  Once normalized, use the medication three time weekly in the affected areas  Return to office monthly while using daily steroid ointment  Once controlled, return to office twice yearly  If left untreated, the risk of squamous cell carcinoma is up to 5%

## 2023-01-09 NOTE — PROGRESS NOTES
Assessment/Plan:  Eliminate triggers that cause the irritation  Wear loose underwear, keep nails short to avoid trauma from scratching  Avoid nighttime scratching  If unable to avoid nighttime scratching, call office for nighttime sedation  Any signs of infection return to office  Clobetasol rx sent to pharmacy on file  Use clobetasol sparingly twice daily until skin texture normalizes which may take 2-4 months  Once normalized, use the medication three time weekly in the affected areas  Return to office monthly while using daily steroid ointment  Once controlled, return to office twice yearly  If left untreated, the risk of squamous cell carcinoma is up to 5%  1  Lichen sclerosus of vulva  -     clobetasol (TEMOVATE) 0 05 % ointment; Apply pea sized amount to affected area twice daily until skin texture normalizes  Then use twice weekly  Subjective:      Patient ID: Kandi Mejia is a 65 y o  female  HPI    Kandi Mejia is a 65 y o   female who is here today for a follow up visit  Vulvar biopsy done of right mid hearts line for for labial agglutination and vulvar itching  States biopsy vulvar site is healing well  Here for test results which returned lichen sclerosis  No health concerns  The following portions of the patient's history were reviewed and updated as appropriate: allergies, current medications, past medical history, past social history, past surgical history and problem list     Review of Systems   Constitutional: Negative  Genitourinary: Negative for genital sores  Musculoskeletal: Negative  Skin: Negative  Hematological: Negative for adenopathy  Psychiatric/Behavioral: Negative  All other systems reviewed and are negative          Objective:      BP 96/60 (BP Location: Left arm, Patient Position: Sitting, Cuff Size: Standard)   Ht 5' 5 25" (1 657 m)   Wt 68 9 kg (152 lb)   BMI 25 10 kg/m²          Physical Exam  Vitals and nursing note reviewed  Constitutional:       Appearance: Normal appearance  She is well-developed  Skin:     General: Skin is warm and dry  Neurological:      Mental Status: She is alert and oriented to person, place, and time     Psychiatric:         Mood and Affect: Mood normal          Behavior: Behavior normal        Pelvic exam/visual exam of biopsy site declined

## 2023-01-12 DIAGNOSIS — F51.01 PRIMARY INSOMNIA: ICD-10-CM

## 2023-01-12 RX ORDER — TRAZODONE HYDROCHLORIDE 50 MG/1
TABLET ORAL
Qty: 60 TABLET | Refills: 0 | Status: SHIPPED | OUTPATIENT
Start: 2023-01-12

## 2023-02-13 ENCOUNTER — OFFICE VISIT (OUTPATIENT)
Dept: OBGYN CLINIC | Facility: CLINIC | Age: 63
End: 2023-02-13

## 2023-02-13 VITALS — BODY MASS INDEX: 25.1 KG/M2 | SYSTOLIC BLOOD PRESSURE: 96 MMHG | DIASTOLIC BLOOD PRESSURE: 66 MMHG | HEIGHT: 65 IN

## 2023-02-13 DIAGNOSIS — N90.4 LICHEN SCLEROSUS OF VULVA: Primary | ICD-10-CM

## 2023-02-13 DIAGNOSIS — N95.1 HOT FLASH, MENOPAUSAL: ICD-10-CM

## 2023-02-13 NOTE — PROGRESS NOTES
Assessment/Plan:  Eliminate triggers that cause the irritation  Wear loose underwear, keep nails short to avoid trauma from scratching  Avoid nighttime scratching  If unable to avoid nighttime scratching, call office for nighttime sedation  Any signs of infection return to office  Already has clobetasol ; No refill needed  Use clobetasol sparingly twice daily until skin texture normalizes which may take one more month  Once normalized, use the medication three time weekly in the affected areas  Return to office monthly while using daily steroid ointment  Once controlled, return to office twice yearly  If left untreated, the risk of squamous cell carcinoma is up to 5%  May consider black cohosh for hot flash treatment  This can be purchased at a health food store  Call with any worsening of symptoms  Normal TSH 10/22  Has checked twice yearly  1  Lichen sclerosus of vulva    2  Hot flash, menopausal               Subjective:      Patient ID: Sam Kam is a 65 y o  female  HPI    Sam Kam is a 65 y o   female who is here today for a follow up visit  No health concerns  22 Vulvar biopsy done with results of lichen sclerosis  Last follow up visit on 23  Currently using clobetasol twice daily  Denies vulvar itching  Vulvar skin improving  The following portions of the patient's history were reviewed and updated as appropriate: allergies, current medications, past family history, past medical history, past social history, past surgical history and problem list     Review of Systems   Constitutional: Negative  Hot flashes daily   Genitourinary: Negative for genital sores and pelvic pain  Skin: Negative  Objective:      BP 96/66 (BP Location: Left arm, Patient Position: Sitting, Cuff Size: Standard)   Ht 5' 5 25" (1 657 m)   BMI 25 10 kg/m²          Physical Exam  Constitutional:       Appearance: Normal appearance     Genitourinary: General: Normal vulva  Exam position: Lithotomy position  Labia:         Right: No rash, tenderness, lesion or injury  Left: No tenderness, lesion or injury  Urethra: No prolapse, urethral pain, urethral swelling or urethral lesion  Comments: Slight crinkled paper appearance (lessened) in areas noted on diagram   Vulvar varicosity as noted in red on diagram    Lymphadenopathy:      Lower Body: No left inguinal adenopathy  Skin:     General: Skin is warm and dry  Coloration: Skin is not pale  Neurological:      Mental Status: She is alert and oriented to person, place, and time     Psychiatric:         Mood and Affect: Mood normal          Behavior: Behavior normal

## 2023-02-13 NOTE — PATIENT INSTRUCTIONS
Eliminate triggers that cause the irritation  Wear loose underwear, keep nails short to avoid trauma from scratching  Avoid nighttime scratching  If unable to avoid nighttime scratching, call office for nighttime sedation  Any signs of infection return to office  Already has clobetasol ; No refill needed  Use clobetasol sparingly twice daily until skin texture normalizes which may take one more month  Once normalized, use the medication three time weekly in the affected areas  Return to office monthly while using daily steroid ointment  Once controlled, return to office twice yearly  If left untreated, the risk of squamous cell carcinoma is up to 5%  May consider black cohosh for hot flash treatment  This can be purchased at a health food store  Call with any worsening of symptoms  Normal TSH 10/22  Has checked twice yearly

## 2023-03-08 ENCOUNTER — HOSPITAL ENCOUNTER (OUTPATIENT)
Dept: RADIOLOGY | Age: 63
Discharge: HOME | End: 2023-03-08

## 2023-03-08 VITALS — BODY MASS INDEX: 25.33 KG/M2 | WEIGHT: 152 LBS | HEIGHT: 65 IN

## 2023-03-08 DIAGNOSIS — Z12.31 ENCOUNTER FOR SCREENING MAMMOGRAM FOR BREAST CANCER: ICD-10-CM

## 2023-03-15 DIAGNOSIS — F51.01 PRIMARY INSOMNIA: ICD-10-CM

## 2023-03-16 RX ORDER — TRAZODONE HYDROCHLORIDE 50 MG/1
TABLET ORAL
Qty: 60 TABLET | Refills: 0 | Status: SHIPPED | OUTPATIENT
Start: 2023-03-16

## 2023-03-20 DIAGNOSIS — F51.01 PRIMARY INSOMNIA: ICD-10-CM

## 2023-03-20 RX ORDER — ALPRAZOLAM 0.5 MG/1
0.5 TABLET ORAL
Qty: 90 TABLET | Refills: 0 | Status: SHIPPED | OUTPATIENT
Start: 2023-03-20

## 2023-04-20 DIAGNOSIS — M25.562 LEFT KNEE PAIN, UNSPECIFIED CHRONICITY: Primary | ICD-10-CM

## 2023-05-06 DIAGNOSIS — F51.01 PRIMARY INSOMNIA: ICD-10-CM

## 2023-05-06 RX ORDER — TRAZODONE HYDROCHLORIDE 50 MG/1
TABLET ORAL
Qty: 60 TABLET | Refills: 0 | Status: SHIPPED | OUTPATIENT
Start: 2023-05-06

## 2023-05-09 ENCOUNTER — HOSPITAL ENCOUNTER (OUTPATIENT)
Dept: RADIOLOGY | Facility: HOSPITAL | Age: 63
Discharge: HOME | End: 2023-05-09
Attending: ORTHOPAEDIC SURGERY

## 2023-05-09 ENCOUNTER — OFFICE VISIT (OUTPATIENT)
Dept: OBGYN CLINIC | Facility: HOSPITAL | Age: 63
End: 2023-05-09

## 2023-05-09 VITALS
HEART RATE: 94 BPM | HEIGHT: 65 IN | BODY MASS INDEX: 25.1 KG/M2 | SYSTOLIC BLOOD PRESSURE: 124 MMHG | DIASTOLIC BLOOD PRESSURE: 77 MMHG

## 2023-05-09 DIAGNOSIS — M76.892 HAMSTRING TENDONITIS OF LEFT THIGH: ICD-10-CM

## 2023-05-09 DIAGNOSIS — M25.562 LEFT KNEE PAIN, UNSPECIFIED CHRONICITY: Primary | ICD-10-CM

## 2023-05-09 DIAGNOSIS — M17.12 PRIMARY OSTEOARTHRITIS OF LEFT KNEE: ICD-10-CM

## 2023-05-09 DIAGNOSIS — M25.562 LEFT KNEE PAIN, UNSPECIFIED CHRONICITY: ICD-10-CM

## 2023-05-09 RX ORDER — LIDOCAINE HYDROCHLORIDE 10 MG/ML
2 INJECTION, SOLUTION INFILTRATION; PERINEURAL
Status: COMPLETED | OUTPATIENT
Start: 2023-05-09 | End: 2023-05-09

## 2023-05-09 RX ORDER — BUPIVACAINE HYDROCHLORIDE 2.5 MG/ML
2 INJECTION, SOLUTION INFILTRATION; PERINEURAL
Status: COMPLETED | OUTPATIENT
Start: 2023-05-09 | End: 2023-05-09

## 2023-05-09 RX ORDER — BETAMETHASONE SODIUM PHOSPHATE AND BETAMETHASONE ACETATE 3; 3 MG/ML; MG/ML
12 INJECTION, SUSPENSION INTRA-ARTICULAR; INTRALESIONAL; INTRAMUSCULAR; SOFT TISSUE
Status: COMPLETED | OUTPATIENT
Start: 2023-05-09 | End: 2023-05-09

## 2023-05-09 RX ADMIN — BETAMETHASONE SODIUM PHOSPHATE AND BETAMETHASONE ACETATE 12 MG: 3; 3 INJECTION, SUSPENSION INTRA-ARTICULAR; INTRALESIONAL; INTRAMUSCULAR; SOFT TISSUE at 16:17

## 2023-05-09 RX ADMIN — LIDOCAINE HYDROCHLORIDE 2 ML: 10 INJECTION, SOLUTION INFILTRATION; PERINEURAL at 16:17

## 2023-05-09 RX ADMIN — BUPIVACAINE HYDROCHLORIDE 2 ML: 2.5 INJECTION, SOLUTION INFILTRATION; PERINEURAL at 16:17

## 2023-05-09 NOTE — PROGRESS NOTES
Assessment:  1  Left knee pain, unspecified chronicity        2  Primary osteoarthritis of left knee        3  Hamstring tendonitis of left thigh            Plan:  Left knee osteoarthritis and left biceps femoris tendonitis  The patient was provided with left IA knee and left biceps femoris insertion steroid injections  The patient tolerated the procedure well  She was shown stretches for hamstrings and IT band  The patient can follow up as needed and is welcome to return at any point with any new or old issue  To do next visit:  Return if symptoms worsen or fail to improve  The above stated was discussed in layman's terms and the patient expressed understanding  All questions were answered to the patient's satisfaction  Scribe Attestation    I,:  Aleksandra Blake am acting as a scribe while in the presence of the attending physician :       I,:  Dany Mancilla MD personally performed the services described in this documentation    as scribed in my presence :             Subjective:   Rajat Smith is a 65 y o  female who presents initial evaluation of bilateral knees  She had last been seen for right knee in 2020 including steroid injection  She has longstanding history of bilateral knee issues  Today she complains of left > right lateral knee pain with occasional left medial mid-foot foot pain  Walking at speed for exercise can aggravate while rest alleviates  She does use IBU on occasion  She denies past knee surgery  Review of systems negative unless otherwise specified in HPI    Past Medical History:   Diagnosis Date   • Dyslipidemia     Last assessed: 7/30/15   • Exposure to SARS-associated coronavirus 6/5/2020   • Visit for suture removal 4/16/2022       Past Surgical History:   Procedure Laterality Date   • ARTHROPLASTY Left 2008    with Prosthesis Trapezium  Thumb w/LRTI  Dr Steven Hagen   Last assessed: 8/1/16   • TONSILLECTOMY      Last assessed: 8/1/16       Family History   Problem Relation Age of Onset   • Alzheimer's disease Mother    • Hyperlipidemia Mother    • Colon cancer Father 48   • No Known Problems Daughter    • No Known Problems Daughter    • No Known Problems Maternal Grandmother    • No Known Problems Maternal Grandfather    • No Known Problems Paternal Grandmother    • No Known Problems Paternal Grandfather    • Pancreatic cancer Maternal Aunt 79   • No Known Problems Maternal Aunt    • Breast cancer Neg Hx    • Ovarian cancer Neg Hx        Social History     Occupational History   • Not on file   Tobacco Use   • Smoking status: Never   • Smokeless tobacco: Never   Vaping Use   • Vaping Use: Never used   Substance and Sexual Activity   • Alcohol use: Yes     Alcohol/week: 10 0 standard drinks     Types: 10 Glasses of wine per week     Comment: One beverage daily 7/2015   • Drug use: No   • Sexual activity: Yes     Partners: Male     Birth control/protection: None, Post-menopausal         Current Outpatient Medications:   •  ALPRAZolam (XANAX) 0 5 mg tablet, Take 1 tablet (0 5 mg total) by mouth daily at bedtime as needed for sleep, Disp: 90 tablet, Rfl: 0  •  albuterol (ProAir HFA) 90 mcg/act inhaler, Inhale 2 puffs every 6 (six) hours as needed for wheezing or shortness of breath, Disp: 8 5 g, Rfl: 0  •  Beclomethasone Dipropionate 80 MCG/ACT AERS, 2 Act (160 mcg total) into each nostril daily (Patient not taking: Reported on 11/29/2022), Disp: 10 6 g, Rfl: 1  •  clobetasol (TEMOVATE) 0 05 % ointment, Apply pea sized amount to affected area twice daily until skin texture normalizes  Then use twice weekly  , Disp: 45 g, Rfl: 0  •  Synthroid 125 MCG tablet, Take 1 tablet (125 mcg total) by mouth daily, Disp: 90 tablet, Rfl: 3  •  traZODone (DESYREL) 50 mg tablet, TAKE 1 OR 2 TABLETS BY MOUTH NIGHTLY FOR INSOMNIA, Disp: 60 tablet, Rfl: 0  •  VITAMIN D PO, Take by mouth, Disp: , Rfl:     Allergies   Allergen Reactions   • Pollen Extract             Vitals: "05/09/23 1527   BP: 124/77   Pulse: 94       Objective:  Physical exam  · General: Awake, Alert, Oriented  · Eyes: Pupils equal, round and reactive to light  · Heart: regular rate and rhythm  · Lungs: No audible wheezing  · Abdomen: soft                    Ortho Exam  Bilateral knees:  Mild varus alignment  TTP over left biceps femoris insertion/attachment point  No erythema or ecchymosis  No effusion or swelling  Normal strength  Good ROM with crepitus   Calf compartments soft and supple  Sensation intact  Toes are warm sensate and mobile      Diagnostics, reviewed and taken today if performed as documented: The attending physician has personally reviewed the pertinent films in PACS and interpretation is as follows:  Left knee x-ray: Moderate patellofemoral arthritic changes  Procedures, if performed today:    Large joint arthrocentesis: L knee  Universal Protocol:  Consent: Verbal consent obtained  Risks and benefits: risks, benefits and alternatives were discussed  Consent given by: patient  Time out: Immediately prior to procedure a \"time out\" was called to verify the correct patient, procedure, equipment, support staff and site/side marked as required    Timeout called at: 5/9/2023 4:16 PM   Patient understanding: patient states understanding of the procedure being performed  Site marked: the operative site was marked  Patient identity confirmed: verbally with patient    Supporting Documentation  Indications: pain   Procedure Details  Location: knee - L knee  Preparation: Patient was prepped and draped in the usual sterile fashion  Needle size: 22 G  Ultrasound guidance: no  Approach: anterolateral  Medications administered: 12 mg betamethasone acetate-betamethasone sodium phosphate 6 (3-3) mg/mL; 2 mL bupivacaine 0 25 %; 2 mL lidocaine 1 %    Patient tolerance: patient tolerated the procedure well with no immediate complications  Dressing:  Sterile dressing applied    Large joint " "arthrocentesis  Universal Protocol:  Consent: Verbal consent obtained  Risks and benefits: risks, benefits and alternatives were discussed  Consent given by: patient  Time out: Immediately prior to procedure a \"time out\" was called to verify the correct patient, procedure, equipment, support staff and site/side marked as required  Timeout called at: 5/9/2023 4:16 PM   Patient understanding: patient states understanding of the procedure being performed  Site marked: the operative site was marked  Patient identity confirmed: verbally with patient    Supporting Documentation  Indications: pain   Procedure Details  Location: knee - Knee joint: left biceps femoris insertion  Preparation: Patient was prepped and draped in the usual sterile fashion  Needle size: 22 G  Ultrasound guidance: no  Approach: anterolateral  Medications administered: 12 mg betamethasone acetate-betamethasone sodium phosphate 6 (3-3) mg/mL; 2 mL bupivacaine 0 25 %; 2 mL lidocaine 1 %    Patient tolerance: patient tolerated the procedure well with no immediate complications  Dressing:  Sterile dressing applied            Portions of the record may have been created with voice recognition software  Occasional wrong word or \"sound a like\" substitutions may have occurred due to the inherent limitations of voice recognition software  Read the chart carefully and recognize, using context, where substitutions have occurred      "

## 2023-06-12 ENCOUNTER — TELEPHONE (OUTPATIENT)
Dept: INTERNAL MEDICINE CLINIC | Facility: CLINIC | Age: 63
End: 2023-06-12

## 2023-06-12 DIAGNOSIS — F51.01 PRIMARY INSOMNIA: ICD-10-CM

## 2023-06-12 DIAGNOSIS — J30.89 SEASONAL ALLERGIC RHINITIS DUE TO OTHER ALLERGIC TRIGGER: ICD-10-CM

## 2023-06-12 DIAGNOSIS — E89.0 POSTOPERATIVE HYPOTHYROIDISM: Primary | ICD-10-CM

## 2023-06-12 DIAGNOSIS — E78.2 MIXED HYPERLIPIDEMIA: ICD-10-CM

## 2023-06-12 RX ORDER — TRAZODONE HYDROCHLORIDE 50 MG/1
TABLET ORAL
Qty: 60 TABLET | Refills: 1 | Status: SHIPPED | OUTPATIENT
Start: 2023-06-12

## 2023-06-12 RX ORDER — BECLOMETHASONE DIPROPIONATE 80 UG/1
AEROSOL, METERED NASAL
Qty: 10.6 G | Refills: 1 | Status: SHIPPED | OUTPATIENT
Start: 2023-06-12

## 2023-06-12 NOTE — TELEPHONE ENCOUNTER
The patient has a wellness scheduled on 9/5/2023  Does she need to have blood work done? Please advise   Thank you

## 2023-06-20 DIAGNOSIS — M23.92 INTERNAL DERANGEMENT OF LEFT KNEE: ICD-10-CM

## 2023-06-20 DIAGNOSIS — M25.562 LEFT KNEE PAIN, UNSPECIFIED CHRONICITY: Primary | ICD-10-CM

## 2023-08-14 ENCOUNTER — HOSPITAL ENCOUNTER (OUTPATIENT)
Dept: RADIOLOGY | Age: 63
Discharge: HOME | End: 2023-08-14
Payer: COMMERCIAL

## 2023-08-14 DIAGNOSIS — M23.92 INTERNAL DERANGEMENT OF LEFT KNEE: ICD-10-CM

## 2023-08-14 DIAGNOSIS — M25.562 LEFT KNEE PAIN, UNSPECIFIED CHRONICITY: ICD-10-CM

## 2023-08-14 PROCEDURE — 73721 MRI JNT OF LWR EXTRE W/O DYE: CPT

## 2023-08-14 PROCEDURE — G1004 CDSM NDSC: HCPCS

## 2023-08-28 ENCOUNTER — TELEPHONE (OUTPATIENT)
Age: 63
End: 2023-08-28

## 2023-08-28 NOTE — TELEPHONE ENCOUNTER
Caller: patient    Doctor: Dr Otis Shore    Reason for call: patient had an mri but first available appt isn't until October. Patient would like to be seen sooner than that.  Please call her to schedule    Call back#: [phone]

## 2023-08-30 NOTE — TELEPHONE ENCOUNTER
Caller: Patient     Doctor: Alban Nguyen     Reason for call: Patient calling back in regards to appointment .       Please call her back       Call back#: [phone]

## 2023-09-05 ENCOUNTER — OFFICE VISIT (OUTPATIENT)
Dept: INTERNAL MEDICINE CLINIC | Facility: CLINIC | Age: 63
End: 2023-09-05
Payer: COMMERCIAL

## 2023-09-05 VITALS
WEIGHT: 154 LBS | SYSTOLIC BLOOD PRESSURE: 114 MMHG | HEART RATE: 74 BPM | BODY MASS INDEX: 25.66 KG/M2 | OXYGEN SATURATION: 98 % | DIASTOLIC BLOOD PRESSURE: 72 MMHG | HEIGHT: 65 IN

## 2023-09-05 DIAGNOSIS — M25.561 CHRONIC PAIN OF BOTH KNEES: ICD-10-CM

## 2023-09-05 DIAGNOSIS — G89.29 CHRONIC PAIN OF BOTH KNEES: ICD-10-CM

## 2023-09-05 DIAGNOSIS — F51.01 PRIMARY INSOMNIA: ICD-10-CM

## 2023-09-05 DIAGNOSIS — G56.02 CARPAL TUNNEL SYNDROME ON LEFT: ICD-10-CM

## 2023-09-05 DIAGNOSIS — E89.0 POSTOPERATIVE HYPOTHYROIDISM: ICD-10-CM

## 2023-09-05 DIAGNOSIS — M25.562 CHRONIC PAIN OF BOTH KNEES: ICD-10-CM

## 2023-09-05 DIAGNOSIS — Z00.00 ANNUAL PHYSICAL EXAM: Primary | ICD-10-CM

## 2023-09-05 PROCEDURE — 99396 PREV VISIT EST AGE 40-64: CPT | Performed by: INTERNAL MEDICINE

## 2023-09-05 RX ORDER — ALPRAZOLAM 0.5 MG/1
0.5 TABLET ORAL
Qty: 30 TABLET | Refills: 5 | Status: SHIPPED | OUTPATIENT
Start: 2023-09-05

## 2023-09-05 RX ORDER — TRAZODONE HYDROCHLORIDE 50 MG/1
TABLET ORAL
Qty: 60 TABLET | Refills: 5 | Status: SHIPPED | OUTPATIENT
Start: 2023-09-05

## 2023-09-05 RX ORDER — LEVOTHYROXINE SODIUM 125 MCG
125 TABLET ORAL DAILY
Qty: 30 TABLET | Refills: 5 | Status: SHIPPED | OUTPATIENT
Start: 2023-09-05

## 2023-09-05 NOTE — PROGRESS NOTES
[address] [facility] [place] [facility]    NAME: Rickie Osborne  AGE: 59 y.o. SEX: female  : 1960     DATE: 2023     Assessment and Plan:     Problem List Items Addressed This Visit        Endocrine    Hypothyroidism    Relevant Medications    Synthroid 125 MCG tablet       Other    Insomnia    Relevant Medications    ALPRAZolam (XANAX) 0.5 mg tablet    traZODone (DESYREL) 50 mg tablet   Other Visit Diagnoses     Annual physical exam    -  Primary    Chronic pain of both knees        Relevant Orders    Sedimentation rate, automated    C-reactive protein    Carpal tunnel syndrome on left              Immunizations and preventive care screenings were discussed with patient today. Appropriate education was printed on patient's after visit summary. Counseling:  Exercise: the importance of regular exercise/physical activity was discussed. Recommend exercise 3-5 times per week for at least 30 minutes. Use wrist splint for carpal tunnel  F/U with ortho for chronic knee pain    BMI Counseling: Body mass index is 25.43 kg/m². The BMI is above normal. Nutrition recommendations include consuming healthier snacks. Exercise recommendations include exercising 3-5 times per week. Rationale for BMI follow-up plan is due to patient being overweight or obese. Depression Screening and Follow-up Plan: Patient was screened for depression during today's encounter. They screened negative with a PHQ-2 score of 0. Return in about 1 year (around 2024). Chief Complaint:     Chief Complaint   Patient presents with   • Annual Exam      History of Present Illness:     Adult Annual Physical   Patient here for a comprehensive physical exam. The patient reports problems - pain in her knees left more than right, carpal tunnel on the left. Diet and Physical Activity  Diet/Nutrition: well balanced diet. Exercise: 5-7 times a week on average. Depression Screening  PHQ-2/9 Depression Screening    Little interest or pleasure in doing things: 0 - not at all  Feeling down, depressed, or hopeless: 0 - not at all  PHQ-2 Score: 0  PHQ-2 Interpretation: Negative depression screen       General Health  Sleep: taking Xanax PRN, trazodone nightly. Hearing: normal - bilateral.  Vision: goes for regular eye exams. Dental: regular dental visits. /GYN Health  Patient is: postmenopausal     Review of Systems:     Review of Systems   Constitutional: Negative for unexpected weight change. Respiratory: Negative for shortness of breath. Cardiovascular: Negative for chest pain and palpitations. Gastrointestinal: Negative for abdominal pain, blood in stool, constipation and diarrhea. Genitourinary: Negative for difficulty urinating, hematuria and vaginal bleeding. Musculoskeletal: Positive for arthralgias (knees that come and go, worse on the left). Neurological: Positive for numbness (hands left >right). Past Medical History:     Past Medical History:   Diagnosis Date   • Allergic 1978   • Disease of thyroid gland 1985   • Dyslipidemia     Last assessed: 7/30/15   • Exposure to SARS-associated coronavirus 06/05/2020   • Visit for suture removal 04/16/2022      Past Surgical History:     Past Surgical History:   Procedure Laterality Date   • ARTHROPLASTY Left 2008    with Prosthesis Trapezium. Thumb w/LRTI. Dr. Morrison . Last assessed: 8/1/16   • TONSILLECTOMY      Last assessed: 8/1/16      Social History:     Social History     Socioeconomic History   • Marital status: /Civil Union     Spouse name: None   • Number of children: 2   • Years of education: None   • Highest education level: None   Occupational History   • None   Tobacco Use   • Smoking status: Never   • Smokeless tobacco: Never   Vaping Use   • Vaping Use: Never used   Substance and Sexual Activity   • Alcohol use:  Yes     Alcohol/week: 10.0 standard drinks of alcohol Types: 10 Glasses of wine per week     Comment: One beverage daily 7/2015   • Drug use: No   • Sexual activity: Yes     Partners: Male     Birth control/protection: Post-menopausal, None   Other Topics Concern   • None   Social History Narrative    Exercises daily     Social Determinants of Health     Financial Resource Strain: Not on file   Food Insecurity: Not on file   Transportation Needs: Not on file   Physical Activity: Not on file   Stress: Not on file   Social Connections: Not on file   Intimate Partner Violence: Not on file   Housing Stability: Not on file      Family History:     Family History   Problem Relation Age of Onset   • Alzheimer's disease Mother    • Hyperlipidemia Mother    • Colon cancer Father 48   • No Known Problems Daughter    • No Known Problems Daughter    • No Known Problems Maternal Grandmother    • No Known Problems Maternal Grandfather    • No Known Problems Paternal Grandmother    • No Known Problems Paternal Grandfather    • Pancreatic cancer Maternal Aunt 79   • No Known Problems Maternal Aunt    • Breast cancer Neg Hx    • Ovarian cancer Neg Hx       Current Medications:     Current Outpatient Medications   Medication Sig Dispense Refill   • ALPRAZolam (XANAX) 0.5 mg tablet Take 1 tablet (0.5 mg total) by mouth daily at bedtime as needed for sleep 30 tablet 5   • Qnasl 80 MCG/ACT AERS SPRAY 2 SPRAYS INTO EACH NOSTRIL DAILY 10.6 g 1   • Synthroid 125 MCG tablet Take 1 tablet (125 mcg total) by mouth daily 30 tablet 5   • traZODone (DESYREL) 50 mg tablet TAKE 1 TO 2 TABLETS BY MOUTH NIGHTLY FOR INSOMNIA 60 tablet 5   • VITAMIN D PO Take by mouth     • clobetasol (TEMOVATE) 0.05 % ointment Apply pea sized amount to affected area twice daily until skin texture normalizes. Then use twice weekly. 45 g 0     No current facility-administered medications for this visit. Allergies:      Allergies   Allergen Reactions   • Pollen Extract       Physical Exam:     /72 (BP Location: Left arm, Patient Position: Sitting, Cuff Size: Standard)   Pulse 74   Ht 5' 5.25" (1.657 m)   Wt 69.9 kg (154 lb)   SpO2 98%   BMI 25.43 kg/m²     Physical Exam  Constitutional:       General: She is not in acute distress. Appearance: She is well-developed. She is not ill-appearing, toxic-appearing or diaphoretic. HENT:      Head: Normocephalic and atraumatic. Right Ear: External ear normal. There is no impacted cerumen. Left Ear: External ear normal. There is no impacted cerumen. Eyes:      Conjunctiva/sclera: Conjunctivae normal.   Cardiovascular:      Rate and Rhythm: Normal rate and regular rhythm. Heart sounds: Normal heart sounds. No murmur heard. Pulmonary:      Effort: Pulmonary effort is normal. No respiratory distress. Breath sounds: Normal breath sounds. No stridor. No wheezing or rales. Abdominal:      General: There is no distension. Palpations: Abdomen is soft. There is no mass. Tenderness: There is no abdominal tenderness. There is no guarding or rebound. Musculoskeletal:      Cervical back: Neck supple. Right knee: No swelling or bony tenderness. Left knee: No swelling or bony tenderness. Right lower leg: No edema. Left lower leg: No edema. Comments: +Phalens on the left   Neurological:      Mental Status: She is alert and oriented to person, place, and time. Psychiatric:         Mood and Affect: Mood normal.         Behavior: Behavior normal.         Thought Content:  Thought content normal.         Judgment: Judgment normal.          Leanne Bailon MD  [facility]

## 2023-09-07 ENCOUNTER — OFFICE VISIT (OUTPATIENT)
Dept: OBGYN CLINIC | Facility: HOSPITAL | Age: 63
End: 2023-09-07
Payer: COMMERCIAL

## 2023-09-07 VITALS
HEIGHT: 65 IN | HEART RATE: 79 BPM | SYSTOLIC BLOOD PRESSURE: 104 MMHG | BODY MASS INDEX: 25.43 KG/M2 | DIASTOLIC BLOOD PRESSURE: 70 MMHG

## 2023-09-07 DIAGNOSIS — M17.12 PRIMARY OSTEOARTHRITIS OF LEFT KNEE: ICD-10-CM

## 2023-09-07 DIAGNOSIS — M76.892 HAMSTRING TENDONITIS OF LEFT THIGH: ICD-10-CM

## 2023-09-07 DIAGNOSIS — M25.562 LEFT KNEE PAIN, UNSPECIFIED CHRONICITY: Primary | ICD-10-CM

## 2023-09-07 PROCEDURE — 99213 OFFICE O/P EST LOW 20 MIN: CPT | Performed by: ORTHOPAEDIC SURGERY

## 2023-09-07 NOTE — PROGRESS NOTES
Assessment:  1. Left knee pain, unspecified chronicity  Ambulatory referral to Physical Therapy      2. Primary osteoarthritis of left knee  Ambulatory referral to Physical Therapy      3. Hamstring tendonitis of left thigh  Ambulatory referral to Physical Therapy          Plan:  Chronic left lateral knee pain  · Left knee MRI reviewed with patient display mild tricompartmental arthritic changes along with mucoid degeneration of ACL. · Patient has strong end-pont with ACL testing with crepitus with ROM  · Despite no obvious findings on MRI and exam the patient is recommended to initiate physical therapy for left knee  · Follow up in 3 months. To do next visit:  Return in about 3 months (around 12/7/2023). The above stated was discussed in layman's terms and the patient expressed understanding. All questions were answered to the patient's satisfaction. Scribe Attestation    I,:  Gurmeet Pandey am acting as a scribe while in the presence of the attending physician.:       I,:  Humera Hampton MD personally performed the services described in this documentation    as scribed in my presence.:             Subjective:   Annie Jiang is a 59 y.o. female who presents for follow up of left knee with MRI review. She is s/p eft IA and left biceps femoris insertion steroid injections with limited benefit, 8/14/2023. She continues to have same symptoms. Today she complains of left lateral knee pain. She rates her pain at 8/10 when aggravated. Walking at speed for exercise, bending to put on clothes or shoes can aggravate while rest alleviates. She denies past knee surgery.         Review of systems negative unless otherwise specified in HPI    Past Medical History:   Diagnosis Date   • Allergic 1978   • Disease of thyroid gland 1985   • Dyslipidemia     Last assessed: 7/30/15   • Exposure to SARS-associated coronavirus 06/05/2020   • Visit for suture removal 04/16/2022       Past Surgical History: Procedure Laterality Date   • ARTHROPLASTY Left 2008    with Prosthesis Trapezium. Thumb w/LRTI. Dr. Peter Kincaid. Last assessed: 8/1/16   • TONSILLECTOMY      Last assessed: 8/1/16       Family History   Problem Relation Age of Onset   • Alzheimer's disease Mother    • Hyperlipidemia Mother    • Colon cancer Father 48   • No Known Problems Daughter    • No Known Problems Daughter    • No Known Problems Maternal Grandmother    • No Known Problems Maternal Grandfather    • No Known Problems Paternal Grandmother    • No Known Problems Paternal Grandfather    • Pancreatic cancer Maternal Aunt 79   • No Known Problems Maternal Aunt    • Breast cancer Neg Hx    • Ovarian cancer Neg Hx        Social History     Occupational History   • Not on file   Tobacco Use   • Smoking status: Never   • Smokeless tobacco: Never   Vaping Use   • Vaping Use: Never used   Substance and Sexual Activity   • Alcohol use: Yes     Alcohol/week: 10.0 standard drinks of alcohol     Types: 10 Glasses of wine per week     Comment: One beverage daily 7/2015   • Drug use: No   • Sexual activity: Yes     Partners: Male     Birth control/protection: Post-menopausal, None         Current Outpatient Medications:   •  ALPRAZolam (XANAX) 0.5 mg tablet, Take 1 tablet (0.5 mg total) by mouth daily at bedtime as needed for sleep, Disp: 30 tablet, Rfl: 5  •  clobetasol (TEMOVATE) 0.05 % ointment, Apply pea sized amount to affected area twice daily until skin texture normalizes. Then use twice weekly. , Disp: 45 g, Rfl: 0  •  Qnasl 80 MCG/ACT AERS, SPRAY 2 SPRAYS INTO EACH NOSTRIL DAILY, Disp: 10.6 g, Rfl: 1  •  Synthroid 125 MCG tablet, Take 1 tablet (125 mcg total) by mouth daily, Disp: 30 tablet, Rfl: 5  •  traZODone (DESYREL) 50 mg tablet, TAKE 1 TO 2 TABLETS BY MOUTH NIGHTLY FOR INSOMNIA, Disp: 60 tablet, Rfl: 5  •  VITAMIN D PO, Take by mouth, Disp: , Rfl:     Allergies   Allergen Reactions   • Pollen Extract             Vitals:    09/07/23 1443   BP: 104/70   Pulse: 79       Objective:  Physical exam  · General: Awake, Alert, Oriented  · Eyes: Pupils equal, round and reactive to light  · Heart: regular rate and rhythm  · Lungs: No audible wheezing  · Abdomen: soft                    Ortho Exam  Left knee:  Mild varus alignment  No erythema or ecchymosis  No effusion or swelling  Normal strength  Good ROM with crepitus   Stable to varus and valgus stress  Strong end-point with anterior drawer  Calf compartments soft and supple  Sensation intact  Toes are warm sensate and mobile    Diagnostics, reviewed and taken today if performed as documented: The attending physician has personally reviewed the pertinent films in PACS and interpretation is as follows:  Left knee MRI:  Mild tricompartmental chondrosis. Mucoid degeneration of ACL. Procedures, if performed today:    Procedures    None performed      Portions of the record may have been created with voice recognition software. Occasional wrong word or "sound a like" substitutions may have occurred due to the inherent limitations of voice recognition software. Read the chart carefully and recognize, using context, where substitutions have occurred.

## 2023-09-18 ENCOUNTER — EVALUATION (OUTPATIENT)
Dept: PHYSICAL THERAPY | Age: 63
End: 2023-09-18
Payer: COMMERCIAL

## 2023-09-18 DIAGNOSIS — M25.562 LEFT KNEE PAIN, UNSPECIFIED CHRONICITY: Primary | ICD-10-CM

## 2023-09-18 DIAGNOSIS — M76.892 HAMSTRING TENDONITIS OF LEFT THIGH: ICD-10-CM

## 2023-09-18 DIAGNOSIS — M17.12 PRIMARY OSTEOARTHRITIS OF LEFT KNEE: ICD-10-CM

## 2023-09-18 PROCEDURE — 97110 THERAPEUTIC EXERCISES: CPT | Performed by: PHYSICAL THERAPIST

## 2023-09-18 PROCEDURE — 97161 PT EVAL LOW COMPLEX 20 MIN: CPT | Performed by: PHYSICAL THERAPIST

## 2023-09-18 NOTE — PROGRESS NOTES
PT Evaluation     Today's date: 2023  Patient name: Danisha Koo  : 1960  MRN: [id number]  Referring provider: Finesse Salguero  Dx:   Encounter Diagnosis     ICD-10-CM    1. Left knee pain, unspecified chronicity  M25.562 Ambulatory referral to Physical Therapy      2. Primary osteoarthritis of left knee  M17.12 Ambulatory referral to Physical Therapy      3. Hamstring tendonitis of left thigh  M76.892 Ambulatory referral to Physical Therapy                     Assessment  Assessment details: Patient seen for PT evaluation for L knee pain, hamstring tendonitis. Patient does present with tightness at B gastroc/soleus complex as well as tight quadriceps. Patient is tender to palpation at the hamstrings and would benefit from Graston to alleviate her pain. Patient will be away on vacation x ~ 1 month. Gave patient exercises - stretches for hamstrings, quads - prone, prone vs standing ham curls, stretches for gastroc and soleus. PT discussed patient's exercise routine at the gym, does exercises in the pool as well as running in the shallow end; question patient's technique/overuse of her hamstrings with running in the shallow end; may benefit from trying to run in the deep end to avoid straining and the impact of running. PT is able to replicate L knee pain with supine knee flexion end range; however, no knee pain at end range prone knee flexion. Reviewed MRI with patient, possible mechanical issue at the knee with muscle tightness and mild weakness at the hamstrings with possible pain coming from posterior tibia from the mucoid degeneration from the ACL. PT recommended patient to try her stretches, exercises at home while away on vacation, if able and to follow up with PT if needed in the future. Patient would benefit from skilled PT for 1-2x/week x 4-6 weeks once she comes back from vacation.    Impairments: abnormal gait, abnormal or restricted ROM, lacks appropriate home exercise program and pain with function  Functional limitations: Knee pain with walking, sit<>stand, no knee pain with stair climbing. Understanding of Dx/Px/POC: good   Prognosis: good    Goals  STG to be met in 4 weeks  - Decrease knee pain to 4/10 with end range flexion (in supine) for patient to have less pain with walking  - Patient to be able to tolerate HEP without increase in knee pain. - Patient to be able to return to jogging in the water and exercises without increase in L knee pain. - Improve ankle ROM to 7 deg DF B to improve heel strike during gait. LTG to be met in 6 weeks. - Patient to be I with HEP  - Patient to be able to walk x 15-30 min with sneakers without increase in L knee pain  - Patient to be able to tolerate gym/fitness program without increase in knee pain. Plan  Patient would benefit from: skilled physical therapy  Planned modality interventions: cryotherapy and thermotherapy: hydrocollator packs  Planned therapy interventions: abdominal trunk stabilization, IASTM, joint mobilization, kinesiology taping, manual therapy, neuromuscular re-education, patient education, postural training, strengthening, stretching, therapeutic activities, therapeutic exercise, home exercise program, gait training and body mechanics training  Frequency: 2x week  Duration in weeks: 6  Treatment plan discussed with: patient        Subjective Evaluation    History of Present Illness  Mechanism of injury: Patient with long history of L>R knee pain. L knee pain x 10 years. Had R knee pain x 2 years ago went to PT and her pain slowly resolved. Patient reports that her L knee started to hurt again a few months ago and followed up with ortho again, MRI taken and appears to be hamstring tendonitis vs biomechanical issues with muscle tightness.      Patient Goals  Patient goals for therapy: decreased pain and increased motion    Pain  Current pain ratin  At best pain ratin  At worst pain ratin  Location: L lateral posterior knee  Aggravating factors: sitting, standing and walking    Social Support  Steps to enter house: yes  Stairs in house: yes   Lives in: multiple-level home      Diagnostic Tests  MRI studies: normal  Treatments  Previous treatment: physical therapy and injection treatment        Objective     Palpation   Left   Muscle spasm in the distal semimembranosus and distal semitendinosus. Tenderness of the distal semimembranosus and distal semitendinosus. Tenderness   Left Knee   No tenderness in the fibular head, inferior patella, ITB, lateral joint line and lateral patella. Additional Tenderness Details  +++ at lateral hamstring- semimembranosis and semitendonosis. Insertion sites. Neurological Testing     Sensation     Knee   Left Knee   Intact: light touch    Active Range of Motion   Left Hip   [facility]  External rotation (90/90): WFL and with pain    Right Hip   Flexion: [facility]  External rotation (90/90): WFL  Left Knee   Flexion: 125 degrees with pain  Prone flexion: 120 degrees with pain    Right Knee   Flexion: 130 degrees   Prone flexion: 120 degrees   Left Ankle/Foot   Dorsiflexion (ke): 0 degrees     Right Ankle/Foot   Dorsiflexion (ke): 5 degrees     Additional Active Range of Motion Details  L knee pain at end range flexion during ER at L hip  Tighter end feel on L with prone knee flexion compared to prone knee flexion on R. Passive Range of Motion   Left Ankle/Foot    Dorsiflexion (ke): 0 degrees     Right Ankle/Foot    Dorsiflexion (ke): 3 degrees     Strength/Myotome Testing     Left Hip   Normal muscle strength    Right Hip   Normal muscle strength    Left Knee   Flexion: 5  Extension: 5    Right Knee   Flexion: 5  Extension: 5    Left Ankle/Foot   Normal strength    Right Ankle/Foot   Normal strength    Ambulation     Observational Gait   Gait: antalgic   Decreased walking speed and stride length.      Additional Observational Gait Details  Antalgic gait pattern during L swing phase. Mild pronation B feet L>R; tight gastroc and soleus             Precautions: none  Knee pain with end range flexion, none with prone knee flexion end range. Stretch quads, strengthen hamstrings. Return to exercise- walking and running in the water    Manuals        Graston to L posterior knee- lateral hamstring area Taught for HEP                               Neuro Re-Ed                Squats to hi/lo        SL squats to hi/lo? Prone ham curls vs standing with weight                                Ther Ex                Supine hamstring stretch 90/90 HEP       Prone quad stretch HEP       Adductor stretch?         Gastroc/soleus stretch HEP               Leg press        Knee extension        Hip abd        Ham curls                                                                        Ther Activity                        Gait Training        Boost TM Running assessment  To assess running with L knee pain               Modalities        CP vs MH PRN

## 2023-09-20 ENCOUNTER — APPOINTMENT (OUTPATIENT)
Dept: LAB | Age: 63
End: 2023-09-20
Payer: COMMERCIAL

## 2023-09-20 DIAGNOSIS — E89.0 POSTOPERATIVE HYPOTHYROIDISM: ICD-10-CM

## 2023-09-20 DIAGNOSIS — E78.2 MIXED HYPERLIPIDEMIA: ICD-10-CM

## 2023-09-20 LAB
ALBUMIN SERPL BCP-MCNC: 4.1 G/DL (ref 3.5–5)
ALP SERPL-CCNC: 60 U/L (ref 34–104)
ALT SERPL W P-5'-P-CCNC: 11 U/L (ref 7–52)
ANION GAP SERPL CALCULATED.3IONS-SCNC: 5 MMOL/L
AST SERPL W P-5'-P-CCNC: 15 U/L (ref 13–39)
BASOPHILS # BLD AUTO: 0.03 THOUSANDS/ÂΜL (ref 0–0.1)
BASOPHILS NFR BLD AUTO: 1 % (ref 0–1)
BILIRUB SERPL-MCNC: 0.45 MG/DL (ref 0.2–1)
BUN SERPL-MCNC: 19 MG/DL (ref 5–25)
CALCIUM SERPL-MCNC: 8.9 MG/DL (ref 8.4–10.2)
CHLORIDE SERPL-SCNC: 107 MMOL/L (ref 96–108)
CHOLEST SERPL-MCNC: 209 MG/DL
CO2 SERPL-SCNC: 28 MMOL/L (ref 21–32)
CREAT SERPL-MCNC: 0.69 MG/DL (ref 0.6–1.3)
CRP SERPL QL: <1 MG/L
EOSINOPHIL # BLD AUTO: 0.15 THOUSAND/ÂΜL (ref 0–0.61)
EOSINOPHIL NFR BLD AUTO: 4 % (ref 0–6)
ERYTHROCYTE [DISTWIDTH] IN BLOOD BY AUTOMATED COUNT: 11.9 % (ref 11.6–15.1)
ERYTHROCYTE [SEDIMENTATION RATE] IN BLOOD: 4 MM/HOUR (ref 0–29)
GFR SERPL CREATININE-BSD FRML MDRD: 93 ML/MIN/1.73SQ M
GLUCOSE P FAST SERPL-MCNC: 97 MG/DL (ref 65–99)
HCT VFR BLD AUTO: 36.6 % (ref 34.8–46.1)
HDLC SERPL-MCNC: 68 MG/DL
HGB BLD-MCNC: 12.4 G/DL (ref 11.5–15.4)
IMM GRANULOCYTES # BLD AUTO: 0.01 THOUSAND/UL (ref 0–0.2)
IMM GRANULOCYTES NFR BLD AUTO: 0 % (ref 0–2)
LDLC SERPL CALC-MCNC: 121 MG/DL (ref 0–100)
LYMPHOCYTES # BLD AUTO: 1.95 THOUSANDS/ÂΜL (ref 0.6–4.47)
LYMPHOCYTES NFR BLD AUTO: 47 % (ref 14–44)
MCH RBC QN AUTO: 31.3 PG (ref 26.8–34.3)
MCHC RBC AUTO-ENTMCNC: 33.9 G/DL (ref 31.4–37.4)
MCV RBC AUTO: 92 FL (ref 82–98)
MONOCYTES # BLD AUTO: 0.38 THOUSAND/ÂΜL (ref 0.17–1.22)
MONOCYTES NFR BLD AUTO: 9 % (ref 4–12)
NEUTROPHILS # BLD AUTO: 1.61 THOUSANDS/ÂΜL (ref 1.85–7.62)
NEUTS SEG NFR BLD AUTO: 39 % (ref 43–75)
NONHDLC SERPL-MCNC: 141 MG/DL
NRBC BLD AUTO-RTO: 0 /100 WBCS
PLATELET # BLD AUTO: 204 THOUSANDS/UL (ref 149–390)
PMV BLD AUTO: 10.5 FL (ref 8.9–12.7)
POTASSIUM SERPL-SCNC: 4.3 MMOL/L (ref 3.5–5.3)
PROT SERPL-MCNC: 6.4 G/DL (ref 6.4–8.4)
RBC # BLD AUTO: 3.96 MILLION/UL (ref 3.81–5.12)
SODIUM SERPL-SCNC: 140 MMOL/L (ref 135–147)
TRIGL SERPL-MCNC: 101 MG/DL
TSH SERPL DL<=0.05 MIU/L-ACNC: 0.86 UIU/ML (ref 0.45–4.5)
WBC # BLD AUTO: 4.13 THOUSAND/UL (ref 4.31–10.16)

## 2023-09-20 PROCEDURE — 84443 ASSAY THYROID STIM HORMONE: CPT

## 2023-09-20 PROCEDURE — 80053 COMPREHEN METABOLIC PANEL: CPT

## 2023-09-20 PROCEDURE — 85025 COMPLETE CBC W/AUTO DIFF WBC: CPT

## 2023-09-20 PROCEDURE — 80061 LIPID PANEL: CPT

## 2023-11-03 ENCOUNTER — TELEPHONE (OUTPATIENT)
Age: 63
End: 2023-11-03

## 2023-11-03 DIAGNOSIS — J01.90 ACUTE NON-RECURRENT SINUSITIS, UNSPECIFIED LOCATION: Primary | ICD-10-CM

## 2023-11-03 RX ORDER — AMOXICILLIN AND CLAVULANATE POTASSIUM 875; 125 MG/1; MG/1
1 TABLET, FILM COATED ORAL EVERY 12 HOURS SCHEDULED
Qty: 20 TABLET | Refills: 0 | Status: SHIPPED | OUTPATIENT
Start: 2023-11-03 | End: 2023-11-13

## 2023-11-03 NOTE — TELEPHONE ENCOUNTER
Pt called stating she has had a sore throat for about a month  For the past two weeks she has has a lot of congestion , green & yellow color. Still has a cough and headache. Pt said she has been taking ibuprofen and Mucinex but she has had not relief. Pt is requesting an antibiotic to be sent over to her [facility].     Please advise  Sending over to clinical incase pt needs triage for headache*

## 2023-11-09 DIAGNOSIS — E89.0 POSTOPERATIVE HYPOTHYROIDISM: ICD-10-CM

## 2023-11-09 RX ORDER — LEVOTHYROXINE SODIUM 125 MCG
125 TABLET ORAL DAILY
Qty: 30 TABLET | Refills: 5 | Status: SHIPPED | OUTPATIENT
Start: 2023-11-09

## 2023-11-09 NOTE — TELEPHONE ENCOUNTER
Pt needs new script sent to [facility]. Pt had previous script transferred to [facility] for a one-time fill. Reason for call:   [x] Refill   [] Prior Auth  [] Other:     Office:   [x] PCP/Provider - [facility] / Parisa Santoyo  [] Specialty/Provider -     Medication: Synthroid    Dose/Frequency: 125mcg qd    Quantity: 30    Pharmacy: [address] [facility], [address]     Does the patient have enough for 3 days?    [] Yes   [x] No - Send as HP to POD

## 2024-01-07 NOTE — PROGRESS NOTES
Assessment/Plan:  Calcium 0684-0453 mg (in divided doses-max 600 mg at one time) + 600-1000 IU Vit D daily. DEXA scan due .  Exercise 150-300 minutes per week minimum including weight bearing exercises.   Pap with high risk HPV Q 5 years, if normal.  Collected today.   Call your insurance company to verify coverage prior to completing any ordered tests.   Annual mammogram ordered and monthly breast self exam recommended.    Colonoscopy-   she will check on the most recent exam date.        Kegels 20 times twice daily.   Silicone based lubricant with sex. (Use water based lubricant with condoms or sexual toys.)    Vaginal moisturizers twice weekly as needed.   Clobetasol refill sent to requested pharmacy. Instruction for use provided.   Return to office in one year or sooner, if needed.        1. Encntr for gyn exam (general) (routine) w/o abn findings    2. Encounter for Papanicolaou smear for cervical cancer screening  -     Liquid-based pap, screening    3. Encounter for screening mammogram for breast cancer  -     Mammo screening bilateral w 3d & cad; Future; Expected date: 2024    4. Lichen sclerosus of vulva  -     clobetasol (TEMOVATE) 0.05 % ointment; Apply pea sized amount to affected area twice daily until skin texture normalizes. Then use twice weekly.               Subjective:      Patient ID: Eryn Blunt is a 63 y.o. female.    HPI    Eryn Blunt is a 63 y.o.  (32 yo girl-2 yo grandson, 30 yo girl ) female who is here today for her annual visit. No gynecologic health concerns. History of premature menopause at age 32 - was told likely related to her thyroid. Her daughters were/are both also struggling with premature ovarian failure.   No vaginal bleeding.   LS biopsy proven on 22. Admits to not being compliant with her clobetasol as she has not had a flare.   Exercise- 5 time per week  Retired in .   Eryn Blunt is sexually active with male partner/  of 36  years. Monogamous and feels safe in this relationship. Denies vaginal pain,bleeding or dryness.    She is not interested in STD screening today.   She denies vaginal discharge, itching or pelvic pain.   She has no urinary concerns, does not have incontinence.  No bowel concerns.  No breast concerns.     Last pap: 08/19/2019 normal with negative HR HPV   DEXA scan: 05/26/2022 osteopenia left femoral neck  Mammogram: 03/08/2023 normal   Colonoscopy:   [facility] 6/1/21 polyp with 5 year recall ; she will check this date.     Family history of cancer:   Cancer-related family history includes Colon cancer (age of onset: 50) in her father; Pancreatic cancer (age of onset: 70) in her maternal aunt. There is no history of Breast cancer or Ovarian cancer.      The following portions of the patient's history were reviewed and updated as appropriate: allergies, current medications, past family history, past medical history, past social history, past surgical history, and problem list.    Review of Systems   Constitutional: Negative.  Negative for activity change, appetite change, chills, diaphoresis, fatigue, fever and unexpected weight change.   HENT:  Negative for congestion, dental problem, sneezing, sore throat and trouble swallowing.    Eyes:  Negative for visual disturbance.   Respiratory:  Negative for chest tightness and shortness of breath.    Cardiovascular:  Negative for chest pain and leg swelling.   Gastrointestinal:  Negative for abdominal pain, constipation, diarrhea, nausea and vomiting.   Genitourinary:  Negative for difficulty urinating, dyspareunia, dysuria, frequency, hematuria, pelvic pain, urgency, vaginal bleeding, vaginal discharge and vaginal pain.   Musculoskeletal:  Negative for back pain and neck pain.   Skin: Negative.    Allergic/Immunologic: Negative.    Neurological:  Negative for weakness and headaches.   Hematological:  Negative for adenopathy.   Psychiatric/Behavioral: Negative.        "    Objective:      /66 (BP Location: Left arm, Patient Position: Sitting, Cuff Size: Standard)   Ht 5' 5.25\" (1.657 m)   Wt 70.8 kg (156 lb)   BMI 25.76 kg/m²          Physical Exam  Vitals and nursing note reviewed.   Constitutional:       Appearance: Normal appearance. She is well-developed.   HENT:      Head: Normocephalic.   Neck:      Thyroid: No thyromegaly.   Cardiovascular:      Rate and Rhythm: Normal rate and regular rhythm.      Heart sounds: Normal heart sounds.   Pulmonary:      Effort: Pulmonary effort is normal.      Breath sounds: Normal breath sounds.   Chest:   Breasts:     Breasts are symmetrical.      Right: Normal. No inverted nipple, mass, nipple discharge, skin change or tenderness.      Left: Normal. No inverted nipple, mass, nipple discharge, skin change or tenderness.   Abdominal:      Palpations: Abdomen is soft.   Genitourinary:     Exam position: Lithotomy position.      Labia:         Right: No rash, tenderness, lesion or injury.         Left: No rash, tenderness, lesion or injury.       Urethra: No prolapse, urethral pain, urethral swelling or urethral lesion.      Vagina: No signs of injury and foreign body. No vaginal discharge, erythema, tenderness, bleeding, lesions or prolapsed vaginal walls.      Cervix: Normal.      Uterus: Normal.       Adnexa: Right adnexa normal and left adnexa normal.        Right: No mass, tenderness or fullness.          Left: No mass, tenderness or fullness.        Rectum: No external hemorrhoid.          Comments: Slight vulvovaginal atrophy  Vaginal tone 3/5  Slight hypopigmentation noted at base of vagina  Musculoskeletal:         General: Normal range of motion.      Cervical back: Normal range of motion.   Lymphadenopathy:      Head:      Right side of head: No submental, submandibular, tonsillar or occipital adenopathy.      Left side of head: No submental, submandibular, tonsillar or occipital adenopathy.      Upper Body:      Right upper " body: No supraclavicular or axillary adenopathy.      Left upper body: No supraclavicular or axillary adenopathy.      Lower Body: No right inguinal adenopathy. No left inguinal adenopathy.   Skin:     General: Skin is warm and dry.   Neurological:      Mental Status: She is alert and oriented to person, place, and time.   Psychiatric:         Mood and Affect: Mood normal.         Behavior: Behavior normal. Behavior is cooperative.

## 2024-01-08 ENCOUNTER — ANNUAL EXAM (OUTPATIENT)
Dept: OBGYN CLINIC | Facility: CLINIC | Age: 64
End: 2024-01-08
Payer: COMMERCIAL

## 2024-01-08 VITALS
WEIGHT: 156 LBS | HEIGHT: 65 IN | SYSTOLIC BLOOD PRESSURE: 102 MMHG | BODY MASS INDEX: 25.99 KG/M2 | DIASTOLIC BLOOD PRESSURE: 66 MMHG

## 2024-01-08 DIAGNOSIS — Z12.4 ENCOUNTER FOR PAPANICOLAOU SMEAR FOR CERVICAL CANCER SCREENING: ICD-10-CM

## 2024-01-08 DIAGNOSIS — Z01.419 ENCNTR FOR GYN EXAM (GENERAL) (ROUTINE) W/O ABN FINDINGS: Primary | ICD-10-CM

## 2024-01-08 DIAGNOSIS — Z12.31 ENCOUNTER FOR SCREENING MAMMOGRAM FOR BREAST CANCER: ICD-10-CM

## 2024-01-08 DIAGNOSIS — N90.4 LICHEN SCLEROSUS OF VULVA: ICD-10-CM

## 2024-01-08 PROCEDURE — G0145 SCR C/V CYTO,THINLAYER,RESCR: HCPCS | Performed by: NURSE PRACTITIONER

## 2024-01-08 PROCEDURE — G0476 HPV COMBO ASSAY CA SCREEN: HCPCS | Performed by: NURSE PRACTITIONER

## 2024-01-08 PROCEDURE — S0612 ANNUAL GYNECOLOGICAL EXAMINA: HCPCS | Performed by: NURSE PRACTITIONER

## 2024-01-08 RX ORDER — PENICILLIN V POTASSIUM 500 MG/1
500 TABLET ORAL 4 TIMES DAILY
COMMUNITY
Start: 2023-12-05 | End: 2024-01-08 | Stop reason: ALTCHOICE

## 2024-01-08 RX ORDER — CLOBETASOL PROPIONATE 0.5 MG/G
OINTMENT TOPICAL
Qty: 45 G | Refills: 0 | Status: SHIPPED | OUTPATIENT
Start: 2024-01-08

## 2024-01-08 NOTE — PATIENT INSTRUCTIONS
Calcium 0023-0479 mg (in divided doses-max 600 mg at one time) + 600-1000 IU Vit D daily. DEXA scan due 2024.  Exercise 150-300 minutes per week minimum including weight bearing exercises.   Pap with high risk HPV Q 5 years, if normal.  Collected today.   Call your insurance company to verify coverage prior to completing any ordered tests.   Annual mammogram ordered and monthly breast self exam recommended.    Colonoscopy-   she will check on the most recent exam date.        Kegels 20 times twice daily.   Silicone based lubricant with sex. (Use water based lubricant with condoms or sexual toys.)    Vaginal moisturizers twice weekly as needed.   Clobetasol refill sent to requested pharmacy. Instruction for use provided.   Return to office in one year or sooner, if needed.

## 2024-01-09 LAB
HPV HR 12 DNA CVX QL NAA+PROBE: NEGATIVE
HPV16 DNA CVX QL NAA+PROBE: NEGATIVE
HPV18 DNA CVX QL NAA+PROBE: NEGATIVE

## 2024-01-10 ENCOUNTER — OFFICE VISIT (OUTPATIENT)
Dept: INTERNAL MEDICINE CLINIC | Facility: CLINIC | Age: 64
End: 2024-01-10
Payer: COMMERCIAL

## 2024-01-10 VITALS
WEIGHT: 153 LBS | DIASTOLIC BLOOD PRESSURE: 70 MMHG | HEIGHT: 65 IN | SYSTOLIC BLOOD PRESSURE: 114 MMHG | BODY MASS INDEX: 25.49 KG/M2

## 2024-01-10 DIAGNOSIS — R10.2 PELVIC PAIN: Primary | ICD-10-CM

## 2024-01-10 LAB
SL AMB  POCT GLUCOSE, UA: NORMAL
SL AMB LEUKOCYTE ESTERASE,UA: NORMAL
SL AMB POCT BILIRUBIN,UA: NORMAL
SL AMB POCT BLOOD,UA: NORMAL
SL AMB POCT CLARITY,UA: NORMAL
SL AMB POCT COLOR,UA: YELLOW
SL AMB POCT KETONES,UA: NORMAL
SL AMB POCT NITRITE,UA: NORMAL
SL AMB POCT PH,UA: 6.5
SL AMB POCT SPECIFIC GRAVITY,UA: 1.02
SL AMB POCT URINE PROTEIN: NORMAL
SL AMB POCT UROBILINOGEN: NORMAL

## 2024-01-10 PROCEDURE — 99214 OFFICE O/P EST MOD 30 MIN: CPT | Performed by: INTERNAL MEDICINE

## 2024-01-10 PROCEDURE — 81002 URINALYSIS NONAUTO W/O SCOPE: CPT | Performed by: INTERNAL MEDICINE

## 2024-01-10 NOTE — PROGRESS NOTES
"Name: Eryn Blunt      : 1960      MRN: [id number]  Encounter Provider: Lea Reyes, MD  Encounter Date: 1/10/2024   Encounter department: [facility]    Assessment & Plan     1. Pelvic pain  -     US pelvis complete w transvaginal; Future; Expected date: 01/10/2024  -     UA w Reflex to Microscopic w Reflex to Culture  -     POCT urine dip    Her tenderness on exam is more in the pelvic region. UA is normal. Obtain pelvic US         Subjective      Dull lower abdominal pain upon waking up in the morning started in November  +nausea, no change in bowel movements or weight change  It is not affected by food  It it has improved since it started   This occurred 20+ years ago that led to an EGD/colon which were normal  She had a colonoscopy in 2021  She just saw her gyn and exam was normal        Review of Systems   Constitutional:  Negative for fever and unexpected weight change.   Gastrointestinal:  Positive for abdominal pain and nausea. Negative for constipation and diarrhea.   Genitourinary:  Negative for vaginal bleeding.       Current Outpatient Medications on File Prior to Visit   Medication Sig   • ALPRAZolam (XANAX) 0.5 mg tablet Take 1 tablet (0.5 mg total) by mouth daily at bedtime as needed for sleep   • clobetasol (TEMOVATE) 0.05 % ointment Apply pea sized amount to affected area twice daily until skin texture normalizes. Then use twice weekly.   • Synthroid 125 MCG tablet Take 1 tablet (125 mcg total) by mouth daily   • traZODone (DESYREL) 50 mg tablet TAKE 1 TO 2 TABLETS BY MOUTH NIGHTLY FOR INSOMNIA   • VITAMIN D PO Take by mouth   • Qnasl 80 MCG/ACT AERS SPRAY 2 SPRAYS INTO EACH NOSTRIL DAILY (Patient not taking: Reported on 2024)       Objective     /70 (BP Location: Left arm, Patient Position: Sitting, Cuff Size: Standard)   Ht 5' 5.25\" (1.657 m)   Wt 69.4 kg (153 lb)   BMI 25.27 kg/m²     Physical Exam  Constitutional:       Appearance: She is " well-developed. She is not ill-appearing.   Eyes:      Conjunctiva/sclera: Conjunctivae normal.   Cardiovascular:      Rate and Rhythm: Normal rate and regular rhythm.      Heart sounds: Normal heart sounds. No murmur heard.  Pulmonary:      Effort: Pulmonary effort is normal. No respiratory distress.      Breath sounds: Normal breath sounds. No wheezing or rales.   Abdominal:      General: There is no distension.      Palpations: Abdomen is soft. There is no mass.      Tenderness: There is abdominal tenderness (pelvis). There is no guarding or rebound.      Hernia: No hernia is present.   Musculoskeletal:      Cervical back: Neck supple.      Right lower leg: No edema.      Left lower leg: No edema.   Neurological:      Mental Status: She is alert and oriented to person, place, and time.   Psychiatric:         Mood and Affect: Mood normal.         Behavior: Behavior normal.         Thought Content: Thought content normal.         Judgment: Judgment normal.       Lea Reyes, MD

## 2024-01-11 ENCOUNTER — TELEPHONE (OUTPATIENT)
Dept: ADMINISTRATIVE | Facility: OTHER | Age: 64
End: 2024-01-11

## 2024-01-11 LAB
LAB AP GYN PRIMARY INTERPRETATION: NORMAL
Lab: NORMAL

## 2024-01-11 NOTE — TELEPHONE ENCOUNTER
----- Message from Lea Reyes, MD sent at 1/10/2024  1:17 PM EST -----  01/10/24 1:17 PM    Hello, our patient Eryn Blunt has had CRC: Colonoscopy completed/performed. Please assist in updating the patient chart by making an External outreach to Dr Carrasco facility located in [facility]. The date of service is 2021.    Thank you,  Lea Reyes, MD  [facility]

## 2024-01-11 NOTE — LETTER
Procedure Request Form: Colonoscopy      Date Requested: 24  Patient: Eryn Blunt  Patient : 1960   Referring Provider: Lea Reyes, MD        Date of Procedure ______________________________       The above patient has informed us that they have completed their   most recent Colonoscopy at your facility. Please complete   this form and attach all corresponding procedure reports/results.    Comments __________________________________________________________  ____________________________________________________________________  ____________________________________________________________________  ____________________________________________________________________    Facility Completing Procedure _________________________________________    Form Completed By (print name) _______________________________________      Signature __________________________________________________________      These reports are needed for  compliance.    Please fax this completed form and a copy of the procedure report to our office located at [address] [place] [address] as soon as possible to Fax [phone] reji Weiss: Phone [phone]    We thank you for your assistance in treating our mutual patient.

## 2024-01-11 NOTE — TELEPHONE ENCOUNTER
Assessment/Plan:    43-year-old male with:  Epigastric pain in the setting of CAD, lower extremity thrombosis, COPD, type 2 diabetes, V-tach, hyperparathyroidism and allergic rhinitis  Discussed treatment options with risks and benefits  Encouraged close follow-up with his cardiologist   Will begin trial of Protonix and check nuclear stress test   Advised patient to call back immediately if symptoms worsen  Otherwise will continue current medications and follow-up in 1-2 months    No problem-specific Assessment & Plan notes found for this encounter  Diagnoses and all orders for this visit:    Epigastric pain  -     pantoprazole (PROTONIX) 40 mg tablet; Take 1 tablet (40 mg total) by mouth daily  -     NM myocardial perfusion spect (rx stress and/or rest); Future    Embolism and thrombosis of arteries of the lower extremities (HCC)    Chronic obstructive pulmonary disease, unspecified COPD type ([place] [address] )    Type 2 diabetes mellitus with ESRD (end-stage renal disease) ([place] [address] )    Ventricular tachycardia (HCC)    Secondary hyperparathyroidism of renal origin ([place] [address] )    Atherosclerosis of native coronary artery without angina pectoris, unspecified whether native or transplanted heart  -     NM myocardial perfusion spect (rx stress and/or rest); Future    Allergic rhinitis due to pollen, unspecified seasonality  -     fluticasone (FLONASE) 50 mcg/act nasal spray; 2 sprays into each nostril daily          Subjective:     Chief Complaint   Patient presents with    Chest Injury     has pacemaker, but having chest tight, went to cardio per cardio normal    Cold Like Symptoms     runny nose,watery eyes    Anxiety     daily, stress in household with grandkids        Patient ID: Joe Rojas  is a 77 y o  male  Patient is a 43-year-old male who presents for follow-up lower extremity thrombosis, COPD, type 2 diabetes, V-tach, hyperparathyroidism    Patient admits being generally stable on his medications but admits Upon review of the In Basket request and the patient's chart, initial outreach has been made via fax to facility. Please see Contacts section for details.     Thank you  Abhishek Gonsalves MA    some nasal congestion and rhinorrhea  He also admits some epigastric pain both with eating and before he eats and also when he goes to do physical activity  Some shortness of breath associated  No fevers chills nausea vomiting  Tolerating p o  Intake  No other complaints at this time      The following portions of the patient's history were reviewed and updated as appropriate: allergies, current medications, past family history, past medical history, past social history, past surgical history and problem list     Review of Systems   Constitutional: Negative  HENT: Negative  Eyes: Negative  Respiratory: Positive for shortness of breath  Cardiovascular: Negative  Gastrointestinal: Positive for abdominal pain  Endocrine: Negative  Genitourinary: Negative  Musculoskeletal: Negative  Allergic/Immunologic: Negative  Neurological: Negative  Hematological: Negative  Psychiatric/Behavioral: Negative  All other systems reviewed and are negative  Objective:      /74   Pulse 84   Temp 98 2 °F (36 8 °C)   Resp 16   Wt 114 kg (252 lb)   SpO2 97%   BMI 38 32 kg/m²          Physical Exam   Constitutional: He is oriented to person, place, and time  He appears well-developed and well-nourished  HENT:   Head: Atraumatic  Right Ear: External ear normal    Left Ear: External ear normal    Eyes: Pupils are equal, round, and reactive to light  Conjunctivae and EOM are normal    Neck: Normal range of motion  Cardiovascular: Normal rate, regular rhythm and normal heart sounds  Pulses are no weak pulses  Pulses:       Dorsalis pedis pulses are 2+ on the right side, and 2+ on the left side  Posterior tibial pulses are 2+ on the right side, and 2+ on the left side  Pulmonary/Chest: Effort normal and breath sounds normal  No respiratory distress  Abdominal: Soft  He exhibits no distension  There is no tenderness  There is no rebound and no guarding  Musculoskeletal: Normal range of motion  Feet:   Right Foot:   Skin Integrity: Negative for ulcer, skin breakdown, erythema, warmth, callus or dry skin  Left Foot:   Skin Integrity: Negative for ulcer, skin breakdown, erythema, warmth, callus or dry skin  Neurological: He is alert and oriented to person, place, and time  No cranial nerve deficit  Skin: Skin is warm and dry  Psychiatric: He has a normal mood and affect  His behavior is normal  Judgment and thought content normal          Patient's shoes and socks removed  Right Foot/Ankle   Right Foot Inspection  Skin Exam: skin normal and skin intact no dry skin, no warmth, no callus, no erythema, no maceration, no abnormal color, no pre-ulcer, no ulcer and no callus                          Toe Exam: ROM and strength within normal limits  Sensory       Monofilament testing: absent  Vascular    The right DP pulse is 2+  The right PT pulse is 2+  Left Foot/Ankle  Left Foot Inspection  Skin Exam: skin normal and skin intactno dry skin, no warmth, no erythema, no maceration, normal color, no pre-ulcer, no ulcer and no callus                         Toe Exam: ROM and strength within normal limits                   Sensory       Monofilament: absent  Vascular    The left DP pulse is 2+  The left PT pulse is 2+  Assign Risk Category:  No deformity present; Loss of protective sensation;  No weak pulses       Risk: 1

## 2024-01-12 NOTE — TELEPHONE ENCOUNTER
Upon review of the In Basket request we  received same 2016 report in     Any additional questions or concerns should be emailed to the Practice Liaisons via the appropriate education email address, please do not reply via In Basket.    Thank you  Abhishek Gonsalves MA

## 2024-01-15 ENCOUNTER — HOSPITAL ENCOUNTER (OUTPATIENT)
Dept: RADIOLOGY | Age: 64
Discharge: HOME | End: 2024-01-15
Payer: COMMERCIAL

## 2024-01-15 DIAGNOSIS — R10.2 PELVIC PAIN: ICD-10-CM

## 2024-01-15 PROCEDURE — 76856 US EXAM PELVIC COMPLETE: CPT

## 2024-01-15 PROCEDURE — 76830 TRANSVAGINAL US NON-OB: CPT

## 2024-01-15 NOTE — LETTER
[facility]  [address] [place] [address]      January 24, 2024    MRN: [id number]     Phone: [phone]     Dear Ms. Blunt,    You recently had a(n) Ultrasound performed on 1/15/2024 at  [facility] that was requested by Lea Reyes, MD. The study was reviewed by a radiologist, which is a physician who specializes in medical imaging. The radiologist issued a report describing his or her findings. In that report there was a finding that the radiologist felt warranted further discussion with your health care provider and that discussion would be beneficial to you.      The results were sent to Lea Reyes, MD on 01/20/2024 11:41 AM. We recommend that you contact Lea Reyes, MD at [phone] or set up an appointment to discuss the results of the imaging test. If you have already heard from Lea Reyes, MD regarding the results of your study, you can disregard this letter.     This letter is not meant to alarm you, but intended to encourage you to follow-up on your results with the provider that sent you for the imaging study. In addition, we have enclosed answers to frequently asked questions by other patients who have also received a letter to review results with their health care provider (see page two).      Thank you for choosing [facility] for your medical imaging needs.                                                                                                                                                        FREQUENTLY ASKED QUESTIONS    Why am I receiving this letter?  [place] State Law requires us to notify patients who have findings on imaging exams that may require more testing or follow-up with a health professional within the next 3 months.        How serious is the finding on the imaging test?  This letter is sent to all patients who may need follow-up or more testing within the next 3 months.  Receiving this  letter does not necessarily mean you have a life-threatening imaging finding or disease.  Recommendations in the radiologist’s imaging report are general in nature and it is up to your healthcare provider to say whether those recommendations make sense for your situation.  You are strongly encouraged to talk to your health care provider about the results and ask whether additional steps need to be taken.    Where can I get a copy of the final report for my recent radiology exam?  To get a full copy of the report you can access your records online at [email or web address] or please contact [facility] Medical Records Department at [phone] Monday through Friday between 8 am and 6 pm.         What do I need to do now?           Please contact your health care provider who requested the imaging study to discuss what further actions (if any) are needed.  You may have already heard from (your ordering provider) in regard to this test in which case you can disregard this letter.        NOTICE IN ACCORDANCE WITH THE [place] STATE “PATIENT TEST RESULT INFORMATION ACT OF 2018”    You are receiving this notice as a result of a determination by your diagnostic imaging service that further discussions of your test results are warranted and would be beneficial to you.    The complete results of your test or tests have been or will be sent to the health care practitioner that ordered the test or tests. It is recommended that you contact your health care practitioner to discuss your results as soon as possible.

## 2024-01-22 ENCOUNTER — TELEPHONE (OUTPATIENT)
Dept: OBGYN CLINIC | Facility: CLINIC | Age: 64
End: 2024-01-22

## 2024-01-22 ENCOUNTER — TELEPHONE (OUTPATIENT)
Dept: INTERNAL MEDICINE CLINIC | Facility: CLINIC | Age: 64
End: 2024-01-22

## 2024-01-22 NOTE — TELEPHONE ENCOUNTER
Called Eryn's cell phone and left message to check this message and call office with any questions/concerns. Pelvic US ordered and forwarded to me from PCP shows a thickened endometrium. This lining should be less than 4 mm and yours is 6 mm, so sampling is recommended. Please call the office at [phone] to schedule an endometrial biopsy. You should consider taking over the counter pain medication such as tylenol and eat at least one hour prior.   You also have a very small left ovarian cyst that is not needing follow up and should resolve on it's own.

## 2024-01-22 NOTE — TELEPHONE ENCOUNTER
The patient has significant finding on her ultra sound for the pelvis complete with transvaginal. Please advise. Thank you

## 2024-01-22 NOTE — RESULT ENCOUNTER NOTE
Called patient and advised, she will review [employer] message that OBGYN sent and call if any further questions.

## 2024-01-24 ENCOUNTER — PROCEDURE VISIT (OUTPATIENT)
Dept: OBGYN CLINIC | Facility: MEDICAL CENTER | Age: 64
End: 2024-01-24
Payer: COMMERCIAL

## 2024-01-24 VITALS
DIASTOLIC BLOOD PRESSURE: 70 MMHG | SYSTOLIC BLOOD PRESSURE: 118 MMHG | BODY MASS INDEX: 25.01 KG/M2 | WEIGHT: 155.6 LBS | HEIGHT: 66 IN

## 2024-01-24 DIAGNOSIS — R10.2 PELVIC PAIN: ICD-10-CM

## 2024-01-24 DIAGNOSIS — N94.89 PELVIC CONGESTION SYNDROME: ICD-10-CM

## 2024-01-24 DIAGNOSIS — R93.89 THICKENED ENDOMETRIUM: Primary | ICD-10-CM

## 2024-01-24 PROCEDURE — 88305 TISSUE EXAM BY PATHOLOGIST: CPT | Performed by: PATHOLOGY

## 2024-01-24 PROCEDURE — 58100 BIOPSY OF UTERUS LINING: CPT | Performed by: NURSE PRACTITIONER

## 2024-01-24 NOTE — PATIENT INSTRUCTIONS
Call for any fever, prolonged or severe pain or bleeding.  Use OTC medication as needed for mild to moderate pain.   Avoid vaginal intercourse for 48 hours or until the bleeding has completely stopped.

## 2024-01-24 NOTE — PROGRESS NOTES
"Endometrial biopsy    Date/Time: 1/24/2024 11:29 AM    Performed by: ASIA Escalante  Authorized by: ASIA Escalante  Universal Protocol:  Consent: Verbal consent obtained. Written consent obtained.  Risks and benefits: risks, benefits and alternatives were discussed  Consent given by: patient  Time out: Immediately prior to procedure a \"time out\" was called to verify the correct patient, procedure, equipment, support staff and site/side marked as required.  Timeout called at: 1/24/2024 11:29 AM.  Patient understanding: patient states understanding of the procedure being performed  Patient consent: the patient's understanding of the procedure matches consent given  Procedure consent: procedure consent matches procedure scheduled  Relevant documents: relevant documents present and verified  Patient identity confirmed: verbally with patient    Indication:     Indications comment:  Thickened endometrium  Pre-procedure:     Premeds:  Ibuprofen  Procedure:     Procedure: endometrial biopsy with Pip[place]      A bivalve speculum was placed in the vagina: yes      Cervix cleaned and prepped: yes      The cervix was dilated: yes      Uterus sounded: yes      Uterus sound depth (cm):  6    Curettes used:  3    Specimen collected: specimen collected and sent to pathology      Specimen collected comment:  Scant tissue/possibly insufficient    Unable to perform due to: pain    Findings:     Uterus size:  Non-gravid    Cervix comment:  Slightly stenotic  Comments:     Procedure comments:  1/15/24 pelvic US -> 1. Endometrial echo complex measures 6 mm in AP caliber, minimally thickened for a postmenopausal female. The fundal endometrium demonstrates a mildly lobulated contour and tiny cystic changes versus trace intracavitary fluid is noted. Findings can be   seen in the setting of endometrial hyperplasia, however, neoplasm is not excluded. Clinical follow-up with endometrial sampling is recommended.     2. Prominent " adnexal vasculature. This finding is nonspecific but can be seen in the setting of pelvic congestion syndrome.     3. Simple appearing cyst within the left ovary measuring 3 mm. Based on the ACR O-RADS system, this is O-RADS category 2 (almost certainly benign with <1% risk of malignancy). The management recommendation in a postmenopausal female is no additional   work-up or follow-up needed.

## 2024-01-29 PROCEDURE — 88305 TISSUE EXAM BY PATHOLOGIST: CPT | Performed by: PATHOLOGY

## 2024-01-31 ENCOUNTER — TELEPHONE (OUTPATIENT)
Dept: OBGYN CLINIC | Facility: CLINIC | Age: 64
End: 2024-01-31

## 2024-01-31 NOTE — TELEPHONE ENCOUNTER
----- Message from ASIA Escalante sent at 1/30/2024  8:23 PM EST -----  Unfortunately the tissue collected (as suspected) was inadequate for evaluation.   The procedure was quite painful for her.   Recommend follow up with a physician to consider D&C for adequate sampling that way she can be anesthetized.

## 2024-02-15 ENCOUNTER — OFFICE VISIT (OUTPATIENT)
Dept: OBGYN CLINIC | Facility: CLINIC | Age: 64
End: 2024-02-15
Payer: COMMERCIAL

## 2024-02-15 VITALS
HEIGHT: 63 IN | BODY MASS INDEX: 27.75 KG/M2 | WEIGHT: 156.6 LBS | SYSTOLIC BLOOD PRESSURE: 120 MMHG | DIASTOLIC BLOOD PRESSURE: 64 MMHG

## 2024-02-15 DIAGNOSIS — N83.202 LEFT OVARIAN CYST: ICD-10-CM

## 2024-02-15 DIAGNOSIS — R93.89 THICKENED ENDOMETRIUM: Primary | ICD-10-CM

## 2024-02-15 PROCEDURE — 99213 OFFICE O/P EST LOW 20 MIN: CPT | Performed by: STUDENT IN AN ORGANIZED HEALTH CARE EDUCATION/TRAINING PROGRAM

## 2024-02-15 NOTE — ASSESSMENT & PLAN NOTE
-EMB in office with insufficient sample  -1/15/24 Pelvic US: Uterus 6.0cm, EMT 6mm, left ovarian simple cyst 0.3cm  -due to EMT 6mm without vaginal bleeding, endometrial biopsy not indicated at this time. Strict bleeding precautions provided  -repeat US ordered to be repeated in 6 months to assess left simple cyst

## 2024-02-15 NOTE — PROGRESS NOTES
"Assessment/Plan:    Thickened endometrium  -EMB in office with insufficient sample  -1/15/24 Pelvic US: Uterus 6.0cm, EMT 6mm, left ovarian simple cyst 0.3cm  -due to EMT 6mm without vaginal bleeding, endometrial biopsy not indicated at this time. Strict bleeding precautions provided  -repeat US ordered to be repeated in 6 months to assess left simple cyst        Diagnoses and all orders for this visit:    Thickened endometrium  -     US pelvis complete w transvaginal; Future    Left ovarian cyst          Subjective:      Patient ID: Eryn Blunt is a 63 y.o. female.    64yo  postmenopausal presents for follow up pelvic US. Pt reports she has never had post menopausal bleeding. Pt reports intermittent pelvic cramping.         The following portions of the patient's history were reviewed and updated as appropriate: allergies, current medications, past family history, past medical history, past social history, past surgical history, and problem list.    Review of Systems   Constitutional:  Negative for appetite change, chills, fatigue and fever.   Respiratory:  Negative for cough, chest tightness, shortness of breath and wheezing.    Cardiovascular:  Negative for chest pain, palpitations and leg swelling.   Gastrointestinal:  Negative for abdominal distention, abdominal pain, constipation, diarrhea, nausea and vomiting.   Endocrine: Negative for cold intolerance, heat intolerance and polyuria.   Genitourinary:  Negative for difficulty urinating, dyspareunia, dysuria, genital sores, menstrual problem, vaginal bleeding, vaginal discharge and vaginal pain.   Neurological:  Negative for dizziness, weakness, light-headedness and headaches.         Objective:      /64 (BP Location: Right arm, Patient Position: Sitting, Cuff Size: Standard)   Ht 5' 2.99\" (1.6 m)   Wt 71 kg (156 lb 9.6 oz)   BMI 27.75 kg/m²          Physical Exam  Constitutional:       General: She is not in acute distress.     Appearance: " Normal appearance. She is normal weight.   Cardiovascular:      Rate and Rhythm: Normal rate.   Pulmonary:      Effort: Pulmonary effort is normal. No respiratory distress.   Abdominal:      General: There is no distension.      Palpations: There is no mass.      Tenderness: There is no abdominal tenderness. There is no guarding or rebound.   Musculoskeletal:      Right lower leg: No edema.      Left lower leg: No edema.   Neurological:      Mental Status: She is alert and oriented to person, place, and time.   Psychiatric:         Mood and Affect: Mood normal.

## 2024-02-20 ENCOUNTER — TELEPHONE (OUTPATIENT)
Dept: OBGYN CLINIC | Facility: MEDICAL CENTER | Age: 64
End: 2024-02-20

## 2024-03-03 DIAGNOSIS — E89.0 POSTOPERATIVE HYPOTHYROIDISM: ICD-10-CM

## 2024-03-03 RX ORDER — LEVOTHYROXINE SODIUM 125 MCG
125 TABLET ORAL DAILY
Qty: 30 TABLET | Refills: 3 | Status: SHIPPED | OUTPATIENT
Start: 2024-03-03

## 2024-03-17 DIAGNOSIS — F51.01 PRIMARY INSOMNIA: ICD-10-CM

## 2024-03-18 RX ORDER — ALPRAZOLAM 0.5 MG/1
0.5 TABLET ORAL
Qty: 30 TABLET | Refills: 5 | Status: SHIPPED | OUTPATIENT
Start: 2024-03-18

## 2024-04-02 ENCOUNTER — HOSPITAL ENCOUNTER (OUTPATIENT)
Dept: RADIOLOGY | Age: 64
Discharge: HOME | End: 2024-04-02
Payer: COMMERCIAL

## 2024-04-02 VITALS — WEIGHT: 156 LBS | BODY MASS INDEX: 28.71 KG/M2 | HEIGHT: 62 IN

## 2024-04-02 DIAGNOSIS — Z12.31 ENCOUNTER FOR SCREENING MAMMOGRAM FOR BREAST CANCER: ICD-10-CM

## 2024-04-02 PROCEDURE — 77063 BREAST TOMOSYNTHESIS BI: CPT

## 2024-04-02 PROCEDURE — 77067 SCR MAMMO BI INCL CAD: CPT

## 2024-04-04 NOTE — RESULT ENCOUNTER NOTE
Normal screening mammogram.   Continue with monthly breast self exam, annual clinical breast exam and annual mammogram.

## 2024-05-14 ENCOUNTER — TELEPHONE (OUTPATIENT)
Age: 64
End: 2024-05-14

## 2024-05-14 DIAGNOSIS — J30.89 SEASONAL ALLERGIC RHINITIS DUE TO OTHER ALLERGIC TRIGGER: ICD-10-CM

## 2024-05-14 RX ORDER — BECLOMETHASONE DIPROPIONATE 80 UG/1
2 AEROSOL, METERED NASAL DAILY
Qty: 10.6 G | Refills: 1 | Status: SHIPPED | OUTPATIENT
Start: 2024-05-14

## 2024-05-14 NOTE — TELEPHONE ENCOUNTER
Pt returned call, informed her the medication was sent to the pharmacy. NFA required at this time.

## 2024-05-14 NOTE — TELEPHONE ENCOUNTER
The patient called she was trying to  refill   her  Q nasal spray  she had one refill on the box  and the pharmacy told her it was cancelled by the doctor       she would like this refilled at [facility]   if you have any questions please call the patient  thank you

## 2024-05-17 ENCOUNTER — TELEPHONE (OUTPATIENT)
Dept: INTERNAL MEDICINE CLINIC | Facility: CLINIC | Age: 64
End: 2024-05-17

## 2024-05-17 NOTE — TELEPHONE ENCOUNTER
Eryn called in to follow up on her medication Qnasl. She would like to know when it will be authorized as per [facility]. Please advise Thank you

## 2024-05-17 NOTE — TELEPHONE ENCOUNTER
Called patient and advised that medication needs a PA and will be sent to authorization team to complete and will call back with a determination from the insurance company.

## 2024-05-22 NOTE — TELEPHONE ENCOUNTER
Pt called again about this. She expressed frustration that this is not done yet. Please, contact her with an update on the progress.

## 2024-06-13 DIAGNOSIS — F51.01 PRIMARY INSOMNIA: ICD-10-CM

## 2024-06-13 RX ORDER — TRAZODONE HYDROCHLORIDE 50 MG/1
TABLET ORAL
Qty: 60 TABLET | Refills: 5 | Status: SHIPPED | OUTPATIENT
Start: 2024-06-13

## 2024-06-25 DIAGNOSIS — E89.0 POSTOPERATIVE HYPOTHYROIDISM: ICD-10-CM

## 2024-06-25 RX ORDER — LEVOTHYROXINE SODIUM 125 MCG
125 TABLET ORAL DAILY
Qty: 30 TABLET | Refills: 5 | Status: SHIPPED | OUTPATIENT
Start: 2024-06-25

## 2024-08-12 DIAGNOSIS — E89.0 POSTOPERATIVE HYPOTHYROIDISM: Primary | ICD-10-CM

## 2024-08-12 DIAGNOSIS — E78.2 MIXED HYPERLIPIDEMIA: ICD-10-CM

## 2024-08-27 ENCOUNTER — HOSPITAL ENCOUNTER (OUTPATIENT)
Dept: RADIOLOGY | Age: 64
Discharge: HOME | End: 2024-08-27
Payer: COMMERCIAL

## 2024-08-27 DIAGNOSIS — R93.89 THICKENED ENDOMETRIUM: ICD-10-CM

## 2024-08-27 PROCEDURE — 76830 TRANSVAGINAL US NON-OB: CPT

## 2024-08-27 PROCEDURE — 76856 US EXAM PELVIC COMPLETE: CPT

## 2024-09-04 ENCOUNTER — APPOINTMENT (OUTPATIENT)
Dept: LAB | Age: 64
End: 2024-09-04
Payer: COMMERCIAL

## 2024-09-04 LAB
ALBUMIN SERPL BCG-MCNC: 4.3 G/DL (ref 3.5–5)
ALP SERPL-CCNC: 64 U/L (ref 34–104)
ALT SERPL W P-5'-P-CCNC: 14 U/L (ref 7–52)
ANION GAP SERPL CALCULATED.3IONS-SCNC: 9 MMOL/L (ref 4–13)
AST SERPL W P-5'-P-CCNC: 15 U/L (ref 13–39)
BASOPHILS # BLD AUTO: 0.02 THOUSANDS/ÂΜL (ref 0–0.1)
BASOPHILS NFR BLD AUTO: 1 % (ref 0–1)
BILIRUB SERPL-MCNC: 0.5 MG/DL (ref 0.2–1)
BILIRUB UR QL STRIP: NEGATIVE
BUN SERPL-MCNC: 18 MG/DL (ref 5–25)
CALCIUM SERPL-MCNC: 9.8 MG/DL (ref 8.4–10.2)
CHLORIDE SERPL-SCNC: 101 MMOL/L (ref 96–108)
CHOLEST SERPL-MCNC: 252 MG/DL
CLARITY UR: CLEAR
CO2 SERPL-SCNC: 29 MMOL/L (ref 21–32)
COLOR UR: COLORLESS
CREAT SERPL-MCNC: 0.69 MG/DL (ref 0.6–1.3)
EOSINOPHIL # BLD AUTO: 0.18 THOUSAND/ÂΜL (ref 0–0.61)
EOSINOPHIL NFR BLD AUTO: 4 % (ref 0–6)
ERYTHROCYTE [DISTWIDTH] IN BLOOD BY AUTOMATED COUNT: 12 % (ref 11.6–15.1)
GFR SERPL CREATININE-BSD FRML MDRD: 92 ML/MIN/1.73SQ M
GLUCOSE P FAST SERPL-MCNC: 99 MG/DL (ref 65–99)
GLUCOSE UR STRIP-MCNC: NEGATIVE MG/DL
HCT VFR BLD AUTO: 41 % (ref 34.8–46.1)
HDLC SERPL-MCNC: 74 MG/DL
HGB BLD-MCNC: 13.3 G/DL (ref 11.5–15.4)
HGB UR QL STRIP.AUTO: NEGATIVE
IMM GRANULOCYTES # BLD AUTO: 0.01 THOUSAND/UL (ref 0–0.2)
IMM GRANULOCYTES NFR BLD AUTO: 0 % (ref 0–2)
KETONES UR STRIP-MCNC: NEGATIVE MG/DL
LDLC SERPL CALC-MCNC: 156 MG/DL (ref 0–100)
LEUKOCYTE ESTERASE UR QL STRIP: NEGATIVE
LYMPHOCYTES # BLD AUTO: 1.75 THOUSANDS/ÂΜL (ref 0.6–4.47)
LYMPHOCYTES NFR BLD AUTO: 40 % (ref 14–44)
MCH RBC QN AUTO: 30.5 PG (ref 26.8–34.3)
MCHC RBC AUTO-ENTMCNC: 32.4 G/DL (ref 31.4–37.4)
MCV RBC AUTO: 94 FL (ref 82–98)
MONOCYTES # BLD AUTO: 0.37 THOUSAND/ÂΜL (ref 0.17–1.22)
MONOCYTES NFR BLD AUTO: 9 % (ref 4–12)
NEUTROPHILS # BLD AUTO: 2.01 THOUSANDS/ÂΜL (ref 1.85–7.62)
NEUTS SEG NFR BLD AUTO: 46 % (ref 43–75)
NITRITE UR QL STRIP: NEGATIVE
NONHDLC SERPL-MCNC: 178 MG/DL
NRBC BLD AUTO-RTO: 0 /100 WBCS
PH UR STRIP.AUTO: 5.5 [PH]
PLATELET # BLD AUTO: 241 THOUSANDS/UL (ref 149–390)
PMV BLD AUTO: 10.3 FL (ref 8.9–12.7)
POTASSIUM SERPL-SCNC: 4.1 MMOL/L (ref 3.5–5.3)
PROT SERPL-MCNC: 6.9 G/DL (ref 6.4–8.4)
PROT UR STRIP-MCNC: NEGATIVE MG/DL
RBC # BLD AUTO: 4.36 MILLION/UL (ref 3.81–5.12)
SODIUM SERPL-SCNC: 139 MMOL/L (ref 135–147)
SP GR UR STRIP.AUTO: 1.02 (ref 1–1.03)
TRIGL SERPL-MCNC: 110 MG/DL
TSH SERPL DL<=0.05 MIU/L-ACNC: 1.03 UIU/ML (ref 0.45–4.5)
UROBILINOGEN UR STRIP-ACNC: <2 MG/DL
WBC # BLD AUTO: 4.34 THOUSAND/UL (ref 4.31–10.16)

## 2024-09-06 DIAGNOSIS — J30.89 SEASONAL ALLERGIC RHINITIS DUE TO OTHER ALLERGIC TRIGGER: ICD-10-CM

## 2024-09-06 RX ORDER — BECLOMETHASONE DIPROPIONATE 80 UG/1
AEROSOL, METERED NASAL
Qty: 10.6 G | Refills: 1 | Status: SHIPPED | OUTPATIENT
Start: 2024-09-06

## 2024-09-10 ENCOUNTER — TELEPHONE (OUTPATIENT)
Age: 64
End: 2024-09-10

## 2024-09-11 DIAGNOSIS — E78.2 MIXED HYPERLIPIDEMIA: Primary | ICD-10-CM

## 2024-09-18 ENCOUNTER — HOSPITAL ENCOUNTER (OUTPATIENT)
Dept: RADIOLOGY | Facility: HOSPITAL | Age: 64
Discharge: HOME | End: 2024-09-18
Payer: COMMERCIAL

## 2024-09-18 DIAGNOSIS — E78.2 MIXED HYPERLIPIDEMIA: ICD-10-CM

## 2024-09-18 PROCEDURE — 75571 CT HRT W/O DYE W/CA TEST: CPT

## 2024-10-06 ENCOUNTER — OFFICE VISIT (OUTPATIENT)
Dept: URGENT CARE | Age: 64
End: 2024-10-06
Payer: COMMERCIAL

## 2024-10-06 VITALS
RESPIRATION RATE: 18 BRPM | TEMPERATURE: 97 F | SYSTOLIC BLOOD PRESSURE: 116 MMHG | HEART RATE: 60 BPM | OXYGEN SATURATION: 97 % | DIASTOLIC BLOOD PRESSURE: 76 MMHG

## 2024-10-06 DIAGNOSIS — J01.00 ACUTE MAXILLARY SINUSITIS, RECURRENCE NOT SPECIFIED: Primary | ICD-10-CM

## 2024-10-06 PROCEDURE — 99213 OFFICE O/P EST LOW 20 MIN: CPT | Performed by: FAMILY MEDICINE

## 2024-10-06 NOTE — PROGRESS NOTES
[facility]        NAME: Eryn Blunt is a 64 y.o. female  : 1960    MRN: [id number]  DATE: 2024  TIME: 3:19 PM    Assessment and Plan   Acute maxillary sinusitis, recurrence not specified [J01.00]  1. Acute maxillary sinusitis, recurrence not specified  amoxicillin-clavulanate (AUGMENTIN) 875-125 mg per tablet    treat with antibiotics given double sickening sign as well as >14 days of symptoms.            Patient Instructions       Follow up with PCP in 3-5 days.  Proceed to  ER if symptoms worsen.    If tests have been performed at [facility], our office will contact you with results if changes need to be made to the care plan discussed with you at the visit.  [facility].    Chief Complaint     Chief Complaint   Patient presents with    Headache    Cough    Sinusitis     Been sick for the last 10 days with a cold she now has a sinus infection- discharge is yellowish brown coloration- she is concerned- flying today to \[facility]\""         History of Present Illness       Sinusitis  This is a new problem. The current episode started 1 to 4 weeks ago (2 weeks). The problem has been gradually worsening since onset. There has been no fever. Associated symptoms include congestion, coughing, headaches, sinus pressure and a sore throat. Pertinent negatives include no chills, diaphoresis, ear pain, hoarse voice, neck pain, shortness of breath, sneezing or swollen glands. Past treatments include oral decongestants.       Review of Systems   Review of Systems   Constitutional:  Negative for chills and diaphoresis.   HENT:  Positive for congestion, sinus pressure and sore throat. Negative for ear pain, hoarse voice and sneezing.    Respiratory:  Positive for cough. Negative for shortness of breath.    Musculoskeletal:  Negative for neck pain.   Neurological:  Positive for headaches.         Current Medications       Current Outpatient Medications:      amoxicillin-clavulanate (AUGMENTIN) 875-125 mg per tablet, Take 1 tablet by mouth every 12 (twelve) hours for 10 days, Disp: 20 tablet, Rfl: 0    ALPRAZolam (XANAX) 0.5 mg tablet, TAKE 1 TABLET BY MOUTH AT BEDTIME AS NEEDED FOR SLEEP, Disp: 30 tablet, Rfl: 5    clobetasol (TEMOVATE) 0.05 % ointment, Apply pea sized amount to affected area twice daily until skin texture normalizes. Then use twice weekly., Disp: 45 g, Rfl: 0    Qnasl 80 MCG/ACT AERS, USE 2 SPRAYS IN EACH NOSTRIL EVERY MORNING, Disp: 10.6 g, Rfl: 1    Synthroid 125 MCG tablet, TAKE 1 TABLET BY MOUTH EVERY DAY, Disp: 30 tablet, Rfl: 5    traZODone (DESYREL) 50 mg tablet, TAKE 1 TO 2 TABLETS BY MOUTH NIGHTLY FOR INSOMNIA, Disp: 60 tablet, Rfl: 5    VITAMIN D PO, Take by mouth, Disp: , Rfl:     Current Allergies     Allergies as of 10/06/2024 - Reviewed 10/06/2024   Allergen Reaction Noted    Pollen extract  07/16/2013            The following portions of the patient's history were reviewed and updated as appropriate: allergies, current medications, past family history, past medical history, past social history, past surgical history and problem list.     Past Medical History:   Diagnosis Date    Allergic 1978    Disease of thyroid gland 1985    Dyslipidemia     Last assessed: 7/30/15    Exposure to SARS-associated coronavirus 06/05/2020    Visit for suture removal 04/16/2022       Past Surgical History:   Procedure Laterality Date    ARTHROPLASTY Left 2008    with Prosthesis Trapezium. Thumb w/LRTI. Dr. Mendes. Last assessed: 8/1/16    TONSILLECTOMY      Last assessed: 8/1/16       Family History   Problem Relation Age of Onset    Alzheimer's disease Mother     Hyperlipidemia Mother     Colon cancer Father 50    No Known Problems Daughter     No Known Problems Daughter     No Known Problems Maternal Grandmother     No Known Problems Maternal Grandfather     No Known Problems Paternal Grandmother     No Known Problems Paternal Grandfather      Pancreatic cancer Maternal Aunt 70    No Known Problems Maternal Aunt     Breast cancer Neg Hx     Ovarian cancer Neg Hx          Medications have been verified.        Objective   /76 (BP Location: Right arm, Patient Position: Sitting, Cuff Size: Standard)   Pulse 60   Temp (!) 97 °F (36.1 °C)   Resp 18   SpO2 97%   No LMP recorded. Patient is postmenopausal.       Physical Exam     Physical Exam  Vitals reviewed.   Constitutional:       General: She is not in acute distress.     Appearance: Normal appearance. She is not ill-appearing, toxic-appearing or diaphoretic.   HENT:      Head: Normocephalic and atraumatic.      Comments: Maxillary and frontal sinus tenderness.      Right Ear: Tympanic membrane normal.      Left Ear: Tympanic membrane normal.      Nose: Congestion and rhinorrhea present.      Mouth/Throat:      Mouth: Mucous membranes are moist.      Pharynx: Posterior oropharyngeal erythema present. No oropharyngeal exudate.   Eyes:      Pupils: Pupils are equal, round, and reactive to light.   Cardiovascular:      Rate and Rhythm: Normal rate and regular rhythm.      Heart sounds: No murmur heard.  Pulmonary:      Effort: Pulmonary effort is normal. No respiratory distress.      Breath sounds: Normal breath sounds.   Abdominal:      General: Abdomen is flat.      Palpations: Abdomen is soft.      Tenderness: There is no abdominal tenderness.   Musculoskeletal:         General: Normal range of motion.      Cervical back: Normal range of motion. No rigidity or tenderness.   Lymphadenopathy:      Cervical: No cervical adenopathy.   Skin:     General: Skin is warm and dry.      Capillary Refill: Capillary refill takes less than 2 seconds.   Neurological:      General: No focal deficit present.      Mental Status: She is alert and oriented to person, place, and time. Mental status is at baseline.   Psychiatric:         Mood and Affect: Mood normal.         Behavior: Behavior normal.         Thought  Content: Thought content normal.

## 2024-11-18 RX ORDER — OXYMETAZOLINE HYDROCHLORIDE 0.05 G/100ML
2 SPRAY NASAL EVERY 12 HOURS
COMMUNITY

## 2024-11-19 ENCOUNTER — OFFICE VISIT (OUTPATIENT)
Dept: INTERNAL MEDICINE CLINIC | Facility: CLINIC | Age: 64
End: 2024-11-19
Payer: COMMERCIAL

## 2024-11-19 VITALS
SYSTOLIC BLOOD PRESSURE: 122 MMHG | OXYGEN SATURATION: 99 % | BODY MASS INDEX: 29.37 KG/M2 | HEIGHT: 62 IN | RESPIRATION RATE: 16 BRPM | HEART RATE: 84 BPM | WEIGHT: 159.6 LBS | DIASTOLIC BLOOD PRESSURE: 72 MMHG

## 2024-11-19 DIAGNOSIS — E78.2 MIXED HYPERLIPIDEMIA: ICD-10-CM

## 2024-11-19 DIAGNOSIS — J98.01 BRONCHOSPASM: ICD-10-CM

## 2024-11-19 DIAGNOSIS — Z00.00 ANNUAL PHYSICAL EXAM: ICD-10-CM

## 2024-11-19 DIAGNOSIS — F51.01 PRIMARY INSOMNIA: ICD-10-CM

## 2024-11-19 DIAGNOSIS — J30.2 SEASONAL ALLERGIES: Primary | ICD-10-CM

## 2024-11-19 DIAGNOSIS — E89.0 POSTOPERATIVE HYPOTHYROIDISM: ICD-10-CM

## 2024-11-19 PROCEDURE — 99214 OFFICE O/P EST MOD 30 MIN: CPT | Performed by: INTERNAL MEDICINE

## 2024-11-19 PROCEDURE — 99396 PREV VISIT EST AGE 40-64: CPT | Performed by: INTERNAL MEDICINE

## 2024-11-19 RX ORDER — ATORVASTATIN CALCIUM 10 MG/1
10 TABLET, FILM COATED ORAL DAILY
Qty: 30 TABLET | Refills: 11 | Status: SHIPPED | OUTPATIENT
Start: 2024-11-19

## 2024-11-19 RX ORDER — ALPRAZOLAM 0.5 MG
0.5 TABLET ORAL DAILY PRN
Qty: 30 TABLET | Refills: 3 | Status: SHIPPED | OUTPATIENT
Start: 2024-11-19

## 2024-11-19 RX ORDER — ALBUTEROL SULFATE 90 UG/1
2 INHALANT RESPIRATORY (INHALATION) EVERY 6 HOURS PRN
Qty: 8.5 G | Refills: 0 | Status: SHIPPED | OUTPATIENT
Start: 2024-11-19

## 2024-11-19 RX ORDER — TRAZODONE HYDROCHLORIDE 50 MG/1
TABLET, FILM COATED ORAL
Qty: 60 TABLET | Refills: 11 | Status: SHIPPED | OUTPATIENT
Start: 2024-11-19

## 2024-11-19 RX ORDER — LEVOTHYROXINE SODIUM 125 MCG
125 TABLET ORAL DAILY
Qty: 30 TABLET | Refills: 11 | Status: SHIPPED | OUTPATIENT
Start: 2024-11-19

## 2024-11-19 NOTE — ASSESSMENT & PLAN NOTE
Offered to increase trazodone to 150 mg or wean off if there is no benefit  Orders:    ALPRAZolam (XANAX) 0.5 mg tablet; Take 1 tablet (0.5 mg total) by mouth daily as needed for sleep or anxiety    traZODone (DESYREL) 50 mg tablet; TAKE 1 TO 2 TABLETS BY MOUTH NIGHTLY FOR INSOMNIA

## 2024-11-19 NOTE — ASSESSMENT & PLAN NOTE
Orders:    Synthroid 125 MCG tablet; Take 1 tablet (125 mcg total) by mouth daily    TSH, 3rd generation with Free T4 reflex; Future

## 2024-11-19 NOTE — PROGRESS NOTES
Adult Annual Physical  Name: Eryn Blunt      : 1960      MRN: [id number]  Encounter Provider: Lea Reyes, MD  Encounter Date: 2024   [facility]    Assessment & Plan  Primary insomnia  Offered to increase trazodone to 150 mg or wean off if there is no benefit  Orders:    ALPRAZolam (XANAX) 0.5 mg tablet; Take 1 tablet (0.5 mg total) by mouth daily as needed for sleep or anxiety    traZODone (DESYREL) 50 mg tablet; TAKE 1 TO 2 TABLETS BY MOUTH NIGHTLY FOR INSOMNIA    Postoperative hypothyroidism    Orders:    Synthroid 125 MCG tablet; Take 1 tablet (125 mcg total) by mouth daily    TSH, 3rd generation with Free T4 reflex; Future    Seasonal allergies    Orders:    albuterol (ProAir HFA) 90 mcg/act inhaler; Inhale 2 puffs every 6 (six) hours as needed for wheezing    Bronchospasm    Orders:    albuterol (ProAir HFA) 90 mcg/act inhaler; Inhale 2 puffs every 6 (six) hours as needed for wheezing    Mixed hyperlipidemia  Based on coronary artery calcium score of 50 in Diaz of 78 percentile, start statin therapy  Lipids and metabolic panel in 3 months  Orders:    atorvastatin (LIPITOR) 10 mg tablet; Take 1 tablet (10 mg total) by mouth daily    Lipid Panel with Direct LDL reflex; Future    Comprehensive metabolic panel; Future    CBC and differential; Future    Comprehensive metabolic panel; Future    Lipid panel; Future    Annual physical exam           Immunizations and preventive care screenings were discussed with patient today. Appropriate education was printed on patient's after visit summary.    Counseling:  Continue healthy diet and regular exercise         History of Present Illness     Adult Annual Physical:  Patient presents for annual physical.     Diet and Physical Activity:  - Diet/Nutrition: well balanced diet.  - Exercise: 5-7 times a week on average.    Depression Screening:  - PHQ-2 Score: 0    General Health:  - Sleep: sleeps poorly. Trouble  "falling asleep, takes trazodone 50 to 100 mg at bedtime  - Hearing: normal hearing bilateral ears.  - Vision: goes for regular eye exams and wears glasses.  - Dental: regular dental visits.    /GYN Health:  - Follows with GYN: yes.   - Menopause: postmenopausal.     Review of Systems   Respiratory:  Negative for shortness of breath.    Cardiovascular:  Negative for chest pain and palpitations.   Gastrointestinal:  Negative for abdominal pain, constipation and diarrhea.   Musculoskeletal:  Positive for arthralgias (Outer knees, chronic).   Neurological:  Negative for dizziness and headaches.   Psychiatric/Behavioral:  Positive for sleep disturbance.          Objective   /72   Pulse 84   Resp 16   Ht 5' 2\" (1.575 m)   Wt 72.4 kg (159 lb 9.6 oz)   SpO2 99%   BMI 29.19 kg/m²     Physical Exam  Constitutional:       General: She is not in acute distress.     Appearance: She is well-developed. She is not ill-appearing, toxic-appearing or diaphoretic.   HENT:      Right Ear: External ear normal. There is no impacted cerumen.      Left Ear: External ear normal. There is no impacted cerumen.   Eyes:      Conjunctiva/sclera: Conjunctivae normal.   Cardiovascular:      Rate and Rhythm: Normal rate and regular rhythm.      Heart sounds: Normal heart sounds. No murmur heard.  Pulmonary:      Effort: Pulmonary effort is normal. No respiratory distress.      Breath sounds: Normal breath sounds. No stridor. No wheezing or rales.   Abdominal:      General: There is no distension.      Palpations: Abdomen is soft. There is no mass.      Tenderness: There is no abdominal tenderness. There is no guarding or rebound.   Musculoskeletal:      Cervical back: Neck supple.      Right lower leg: No edema.      Left lower leg: No edema.   Neurological:      Mental Status: She is alert and oriented to person, place, and time.   Psychiatric:         Mood and Affect: Mood normal.         Behavior: Behavior normal.         Thought " Content: Thought content normal.         Judgment: Judgment normal.

## 2024-11-19 NOTE — PATIENT INSTRUCTIONS
"Patient Education     Routine physical for adults   The Basics   Written by the doctors and editors at [facility]   What is a physical? -- A physical is a routine visit, or \"check-up,\" with your doctor. You might also hear it called a \"wellness visit\" or \"preventive visit.\"  During each visit, the doctor will:   Ask about your physical and mental health   Ask about your habits, behaviors, and lifestyle   Do an exam   Give you vaccines if needed   Talk to you about any medicines you take   Give advice about your health   Answer your questions  Getting regular check-ups is an important part of taking care of your health. It can help your doctor find and treat any problems you have. But it's also important for preventing health problems.  A routine physical is different from a \"sick visit.\" A sick visit is when you see a doctor because of a health concern or problem. Since physicals are scheduled ahead of time, you can think about what you want to ask the doctor.  How often should I get a physical? -- It depends on your age and health. In general, for people age 21 years and older:   If you are younger than 50 years, you might be able to get a physical every 3 years.   If you are 50 years or older, your doctor might recommend a physical every year.  If you have an ongoing health condition, like diabetes or high blood pressure, your doctor will probably want to see you more often.  What happens during a physical? -- In general, each visit will include:   Physical exam - The doctor or nurse will check your height, weight, heart rate, and blood pressure. They will also look at your eyes and ears. They will ask about how you are feeling and whether you have any symptoms that bother you.   Medicines - It's a good idea to bring a list of all the medicines you take to each doctor visit. Your doctor will talk to you about your medicines and answer any questions. Tell them if you are having any side effects that bother you. You " "should also tell them if you are having trouble paying for any of your medicines.   Habits and behaviors - This includes:   Your diet   Your exercise habits   Whether you smoke, drink alcohol, or use drugs   Whether you are sexually active   Whether you feel safe at home  Your doctor will talk to you about things you can do to improve your health and lower your risk of health problems. They will also offer help and support. For example, if you want to quit smoking, they can give you advice and might prescribe medicines. If you want to improve your diet or get more physical activity, they can help you with this, too.   Lab tests, if needed - The tests you get will depend on your age and situation. For example, your doctor might want to check your:   Cholesterol   Blood sugar   Iron level   Vaccines - The recommended vaccines will depend on your age, health, and what vaccines you already had. Vaccines are very important because they can prevent certain serious or deadly infections.   Discussion of screening - \"Screening\" means checking for diseases or other health problems before they cause symptoms. Your doctor can recommend screening based on your age, risk, and preferences. This might include tests to check for:   Cancer, such as breast, prostate, cervical, ovarian, colorectal, prostate, lung, or skin cancer   Sexually transmitted infections, such as chlamydia and gonorrhea   Mental health conditions like depression and anxiety  Your doctor will talk to you about the different types of screening tests. They can help you decide which screenings to have. They can also explain what the results might mean.   Answering questions - The physical is a good time to ask the doctor or nurse questions about your health. If needed, they can refer you to other doctors or specialists, too.  Adults older than 65 years often need other care, too. As you get older, your doctor will talk to you about:   How to prevent falling at " home   Hearing or vision tests   Memory testing   How to take your medicines safely   Making sure that you have the help and support you need at home  All topics are updated as new evidence becomes available and our peer review process is complete.  This topic retrieved from [employer] on: May 02, 2024.  Topic 193612 Version 1.0  Release: 32.4.3 - C32.122  © 2024 UpToDate, Inc. and/or its affiliates. All rights reserved.  Consumer Information Use and Disclaimer   Disclaimer: This generalized information is a limited summary of diagnosis, treatment, and/or medication information. It is not meant to be comprehensive and should be used as a tool to help the user understand and/or assess potential diagnostic and treatment options. It does NOT include all information about conditions, treatments, medications, side effects, or risks that may apply to a specific patient. It is not intended to be medical advice or a substitute for the medical advice, diagnosis, or treatment of a health care provider based on the health care provider's examination and assessment of a patient's specific and unique circumstances. Patients must speak with a health care provider for complete information about their health, medical questions, and treatment options, including any risks or benefits regarding use of medications. This information does not endorse any treatments or medications as safe, effective, or approved for treating a specific patient. UpToDate, Inc. and its affiliates disclaim any warranty or liability relating to this information or the use thereof.The use of this information is governed by the Terms of Use, available at https://www.[facility].com/en/know/clinical-effectiveness-terms. 2024© UpToDate, Inc. and its affiliates and/or licensors. All rights reserved.  Copyright   © 2024 UpToDate, Inc. and/or its affiliates. All rights reserved.

## 2024-11-19 NOTE — ASSESSMENT & PLAN NOTE
Based on coronary artery calcium score of 50 in Diaz of 78 percentile, start statin therapy  Lipids and metabolic panel in 3 months  Orders:    atorvastatin (LIPITOR) 10 mg tablet; Take 1 tablet (10 mg total) by mouth daily    Lipid Panel with Direct LDL reflex; Future    Comprehensive metabolic panel; Future    CBC and differential; Future    Comprehensive metabolic panel; Future    Lipid panel; Future

## 2024-11-27 ENCOUNTER — PATIENT MESSAGE (OUTPATIENT)
Dept: INTERNAL MEDICINE CLINIC | Facility: CLINIC | Age: 64
End: 2024-11-27

## 2024-12-10 ENCOUNTER — TELEMEDICINE (OUTPATIENT)
Dept: INTERNAL MEDICINE CLINIC | Facility: CLINIC | Age: 64
End: 2024-12-10
Payer: COMMERCIAL

## 2024-12-10 DIAGNOSIS — F51.01 PRIMARY INSOMNIA: Primary | ICD-10-CM

## 2024-12-10 PROCEDURE — 99213 OFFICE O/P EST LOW 20 MIN: CPT | Performed by: INTERNAL MEDICINE

## 2024-12-10 RX ORDER — TEMAZEPAM 7.5 MG/1
7.5 CAPSULE ORAL
Qty: 30 CAPSULE | Refills: 2 | Status: SHIPPED | OUTPATIENT
Start: 2024-12-10

## 2024-12-10 NOTE — ASSESSMENT & PLAN NOTE
Chronic insomnia affecting quality of life  Discussed starting temazepam at a low dose and will increase as needed  Wean off trazodone by reducing to 50 mg daily for 3 to 4 days then every other day for a week then stop  May continue infrequent as needed use of Xanax  Orders:  •  temazepam (RESTORIL) 7.5 mg capsule; Take 1 capsule (7.5 mg total) by mouth daily at bedtime as needed for sleep

## 2024-12-10 NOTE — PROGRESS NOTES
Virtual Regular Visit  Name: Eryn Blunt      : 1960      MRN: [id number]  Encounter Provider: Lea Reyes, MD  Encounter Date: 12/10/2024   [facility]: [facility] [place]      Verification of patient location:  Patient is located at Home in the following state in which I hold an active license [place] :  Assessment & Plan  Primary insomnia  Chronic insomnia affecting quality of life  Discussed starting temazepam at a low dose and will increase as needed  Wean off trazodone by reducing to 50 mg daily for 3 to 4 days then every other day for a week then stop  May continue infrequent as needed use of Xanax  Orders:  •  temazepam (RESTORIL) 7.5 mg capsule; Take 1 capsule (7.5 mg total) by mouth daily at bedtime as needed for sleep    Primary insomnia           Primary insomnia               Encounter provider Lea Reyes, MD    The patient was identified by name and date of birth. Eryn Blunt was informed that this is a telemedicine visit and that the visit is being conducted through the [employer] platform. She agrees to proceed..  My office door was closed. No one else was in the room.  She acknowledged consent and understanding of privacy and security of the video platform. The patient has agreed to participate and understands they can discontinue the visit at any time.    Patient is aware this is a billable service.     History of Present Illness     Long history of insomnia, no relief from zolpidem, zolpidem CR  Tried trazodone and has been taking 100 mg daily without improvement  She takes alprazolam infrequently for anxiety  Difficulty falling asleep and staying asleep and spite of good sleep hygiene  She has mild anxiety later in the day and the more recent months      Review of Systems   Psychiatric/Behavioral:  Positive for sleep disturbance.        Objective   There were no vitals taken for this visit.    Physical Exam  Constitutional:       Appearance: She is not  ill-appearing.   Pulmonary:      Effort: No respiratory distress.   Psychiatric:         Mood and Affect: Mood normal.         Behavior: Behavior normal.         Visit Time  Total Visit Duration: 15mins

## 2025-01-14 ENCOUNTER — ANNUAL EXAM (OUTPATIENT)
Dept: OBGYN CLINIC | Facility: CLINIC | Age: 65
End: 2025-01-14
Payer: COMMERCIAL

## 2025-01-14 VITALS
WEIGHT: 160 LBS | BODY MASS INDEX: 25.71 KG/M2 | DIASTOLIC BLOOD PRESSURE: 64 MMHG | SYSTOLIC BLOOD PRESSURE: 116 MMHG | HEIGHT: 66 IN

## 2025-01-14 DIAGNOSIS — Z01.419 ENCNTR FOR GYN EXAM (GENERAL) (ROUTINE) W/O ABN FINDINGS: Primary | ICD-10-CM

## 2025-01-14 DIAGNOSIS — Z12.31 ENCOUNTER FOR SCREENING MAMMOGRAM FOR BREAST CANCER: ICD-10-CM

## 2025-01-14 DIAGNOSIS — N83.201 BILATERAL OVARIAN CYSTS: ICD-10-CM

## 2025-01-14 DIAGNOSIS — N83.202 BILATERAL OVARIAN CYSTS: ICD-10-CM

## 2025-01-14 PROCEDURE — S0612 ANNUAL GYNECOLOGICAL EXAMINA: HCPCS | Performed by: STUDENT IN AN ORGANIZED HEALTH CARE EDUCATION/TRAINING PROGRAM

## 2025-01-14 NOTE — ASSESSMENT & PLAN NOTE
-8/27/24 Pelvic US EMT 6mm, left ovary 3mm cyst, right ovary 5mm cyst   -repeat pelvic US ordered to be done 3/2025    Orders:    US pelvis complete w transvaginal; Future

## 2025-01-14 NOTE — ASSESSMENT & PLAN NOTE
-pap: 1/8/24 NILM HPV neg   -mammogram: 4/2/24 BIRADs 1   -colonoscopy: 6/27/16, repeat in 10 years   -denies postmenopausal bleeding   -sexually active, denies dyspareunia  -STD testing: denies   -smoking: denies   -drinks alcohol socially  -recommend 30 min of exercise daily   -denies family history of ovarian, breast cancer  -father with history of colon cancer diagnosed in his 50s  -return in one year or earlier if needed     Orders:    Mammo screening bilateral w 3d and cad; Future

## 2025-01-14 NOTE — PROGRESS NOTES
"Name: Eryn Blunt      : 1960      MRN: [id number]  Encounter Provider: Sharda Damian DO  Encounter Date: 2025   Encounter department: [facility] [place]  :  Assessment & Plan  Encntr for gyn exam (general) (routine) w/o abn findings  -pap: 24 NILM HPV neg   -mammogram: 24 BIRADs 1   -colonoscopy: 17, repeat in 10 years   -denies postmenopausal bleeding   -sexually active, denies dyspareunia  -STD testing: denies   -smoking: denies   -drinks alcohol socially  -recommend 30 min of exercise daily   -denies family history of ovarian, breast cancer  -father with history of colon cancer diagnosed in his 50s  -return in one year or earlier if needed     Orders:    Mammo screening bilateral w 3d and cad; Future    Encounter for screening mammogram for breast cancer         Bilateral ovarian cysts  -24 Pelvic US EMT 6mm, left ovary 3mm cyst, right ovary 5mm cyst   -repeat pelvic US ordered to be done 3/2025    Orders:    US pelvis complete w transvaginal; Future        History of Present Illness   HPI  Eryn Blunt is a 64 y.o. female  postmenopausal presents for annual exam. Has no acute complaints.       Review of Systems   Constitutional:  Negative for appetite change, chills, fatigue and fever.   Respiratory:  Negative for cough, chest tightness, shortness of breath and wheezing.    Cardiovascular:  Negative for chest pain, palpitations and leg swelling.   Gastrointestinal:  Negative for abdominal distention, abdominal pain, constipation, diarrhea, nausea and vomiting.   Endocrine: Negative for cold intolerance, heat intolerance and polyuria.   Genitourinary:  Negative for difficulty urinating, dyspareunia, dysuria, genital sores, menstrual problem, vaginal bleeding, vaginal discharge and vaginal pain.   Neurological:  Negative for dizziness, weakness, light-headedness and headaches.          Objective   /64   Ht 5' 5.75\" (1.67 m)   " Wt 72.6 kg (160 lb)   BMI 26.02 kg/m²      Physical Exam  Constitutional:       General: She is not in acute distress.     Appearance: Normal appearance. She is not ill-appearing.   Cardiovascular:      Rate and Rhythm: Normal rate.   Pulmonary:      Effort: Pulmonary effort is normal. No respiratory distress.   Chest:   Breasts:     Breasts are symmetrical.      Right: Normal. No swelling, bleeding, inverted nipple, mass, nipple discharge, skin change or tenderness.      Left: Normal. No swelling, bleeding, inverted nipple, mass, nipple discharge, skin change or tenderness.   Abdominal:      General: Abdomen is flat. There is no distension.      Palpations: Abdomen is soft.      Tenderness: There is no abdominal tenderness. There is no guarding or rebound.   Genitourinary:     General: Normal vulva.      Exam position: Lithotomy position.      Labia:         Right: No rash, tenderness, lesion or injury.         Left: No rash, tenderness, lesion or injury.       Urethra: No prolapse, urethral pain, urethral swelling or urethral lesion.      Vagina: Normal. No foreign body. No vaginal discharge, erythema, tenderness, bleeding or lesions.      Cervix: Normal. No cervical motion tenderness, discharge, friability, lesion, erythema, cervical bleeding or eversion.      Uterus: Normal. Not enlarged, not fixed, not tender and no uterine prolapse.       Adnexa: Right adnexa normal and left adnexa normal.        Right: No mass, tenderness or fullness.          Left: No mass, tenderness or fullness.        Comments: Vaginal atrophy   Musculoskeletal:      Right lower leg: No edema.      Left lower leg: No edema.   Lymphadenopathy:      Upper Body:      Right upper body: No supraclavicular, axillary or pectoral adenopathy.      Left upper body: No supraclavicular, axillary or pectoral adenopathy.   Neurological:      General: No focal deficit present.      Mental Status: She is alert and oriented to person, place, and time.    Psychiatric:         Mood and Affect: Mood normal.         Behavior: Behavior normal.         Thought Content: Thought content normal.         Judgment: Judgment normal.

## 2025-02-13 PROBLEM — Z01.419 ENCNTR FOR GYN EXAM (GENERAL) (ROUTINE) W/O ABN FINDINGS: Status: RESOLVED | Noted: 2025-01-14 | Resolved: 2025-02-13

## 2025-02-28 ENCOUNTER — APPOINTMENT (OUTPATIENT)
Dept: LAB | Age: 65
End: 2025-02-28
Payer: COMMERCIAL

## 2025-02-28 DIAGNOSIS — E78.2 MIXED HYPERLIPIDEMIA: ICD-10-CM

## 2025-02-28 LAB
ALBUMIN SERPL BCG-MCNC: 4.3 G/DL (ref 3.5–5)
ALP SERPL-CCNC: 63 U/L (ref 34–104)
ALT SERPL W P-5'-P-CCNC: 16 U/L (ref 7–52)
ANION GAP SERPL CALCULATED.3IONS-SCNC: 9 MMOL/L (ref 4–13)
AST SERPL W P-5'-P-CCNC: 18 U/L (ref 13–39)
BILIRUB SERPL-MCNC: 0.65 MG/DL (ref 0.2–1)
BUN SERPL-MCNC: 21 MG/DL (ref 5–25)
CALCIUM SERPL-MCNC: 9.2 MG/DL (ref 8.4–10.2)
CHLORIDE SERPL-SCNC: 104 MMOL/L (ref 96–108)
CHOLEST SERPL-MCNC: 195 MG/DL (ref ?–200)
CO2 SERPL-SCNC: 27 MMOL/L (ref 21–32)
CREAT SERPL-MCNC: 0.66 MG/DL (ref 0.6–1.3)
GFR SERPL CREATININE-BSD FRML MDRD: 94 ML/MIN/1.73SQ M
GLUCOSE P FAST SERPL-MCNC: 113 MG/DL (ref 65–99)
HDLC SERPL-MCNC: 69 MG/DL
LDLC SERPL CALC-MCNC: 105 MG/DL (ref 0–100)
POTASSIUM SERPL-SCNC: 4.1 MMOL/L (ref 3.5–5.3)
PROT SERPL-MCNC: 6.7 G/DL (ref 6.4–8.4)
SODIUM SERPL-SCNC: 140 MMOL/L (ref 135–147)
TRIGL SERPL-MCNC: 107 MG/DL (ref ?–150)

## 2025-02-28 PROCEDURE — 80061 LIPID PANEL: CPT

## 2025-02-28 PROCEDURE — 36415 COLL VENOUS BLD VENIPUNCTURE: CPT

## 2025-02-28 PROCEDURE — 80053 COMPREHEN METABOLIC PANEL: CPT

## 2025-03-10 ENCOUNTER — RESULTS FOLLOW-UP (OUTPATIENT)
Dept: INTERNAL MEDICINE CLINIC | Facility: CLINIC | Age: 65
End: 2025-03-10

## 2025-03-10 DIAGNOSIS — E78.2 MIXED HYPERLIPIDEMIA: ICD-10-CM

## 2025-03-10 DIAGNOSIS — E89.0 POSTOPERATIVE HYPOTHYROIDISM: Primary | ICD-10-CM

## 2025-03-10 DIAGNOSIS — R73.01 IMPAIRED FASTING BLOOD SUGAR: ICD-10-CM

## 2025-03-17 ENCOUNTER — HOSPITAL ENCOUNTER (OUTPATIENT)
Dept: RADIOLOGY | Age: 65
Discharge: HOME | End: 2025-03-17
Payer: COMMERCIAL

## 2025-03-17 DIAGNOSIS — N83.202 BILATERAL OVARIAN CYSTS: ICD-10-CM

## 2025-03-17 DIAGNOSIS — N83.201 BILATERAL OVARIAN CYSTS: ICD-10-CM

## 2025-03-17 PROCEDURE — 76830 TRANSVAGINAL US NON-OB: CPT

## 2025-03-17 PROCEDURE — 76856 US EXAM PELVIC COMPLETE: CPT

## 2025-03-24 ENCOUNTER — RESULTS FOLLOW-UP (OUTPATIENT)
Dept: OBGYN CLINIC | Facility: MEDICAL CENTER | Age: 65
End: 2025-03-24

## 2025-06-06 DIAGNOSIS — J30.89 SEASONAL ALLERGIC RHINITIS DUE TO OTHER ALLERGIC TRIGGER: ICD-10-CM

## 2025-06-09 RX ORDER — BECLOMETHASONE DIPROPIONATE 80 UG/1
2 AEROSOL, METERED NASAL EVERY MORNING
Qty: 10.6 G | Refills: 0 | Status: SHIPPED | OUTPATIENT
Start: 2025-06-09

## 2025-07-02 ENCOUNTER — HOSPITAL ENCOUNTER (OUTPATIENT)
Dept: RADIOLOGY | Age: 65
Discharge: HOME | End: 2025-07-02
Attending: STUDENT IN AN ORGANIZED HEALTH CARE EDUCATION/TRAINING PROGRAM
Payer: COMMERCIAL

## 2025-07-02 DIAGNOSIS — Z01.419 ENCNTR FOR GYN EXAM (GENERAL) (ROUTINE) W/O ABN FINDINGS: ICD-10-CM

## 2025-07-02 PROCEDURE — 77063 BREAST TOMOSYNTHESIS BI: CPT

## 2025-07-02 PROCEDURE — 77067 SCR MAMMO BI INCL CAD: CPT

## 2025-07-18 ENCOUNTER — TELEPHONE (OUTPATIENT)
Dept: INTERNAL MEDICINE CLINIC | Facility: CLINIC | Age: 65
End: 2025-07-18

## 2025-07-18 NOTE — TELEPHONE ENCOUNTER
Patient called in upset and inquiring about the medication:    Beclomethasone Dipropionate (Qnasl) 80 MCG/ACT AERS    She advised me that pharmacy is stating a prior auth needs to be started by us for this.    Please start prior auth for medication

## 2025-07-18 NOTE — TELEPHONE ENCOUNTER
PA for     Beclomethasone Dipropionate (Qnasl) 80 MCG/ACT AERS   SUBMITTED to [employer]    via    [x]CMM-KEY: [id number]  []Surescripts-Case ID #  []Availity-Auth ID #  NDC #   []Faxed to plan   []Other website    []Phone call Case ID #      [x]PA sent as URGENT    All office notes, labs and other pertaining documents and studies sent. Clinical questions answered. Awaiting determination from insurance company.     Turnaround time for your insurance to make a decision on your Prior Authorization can take 7-21 business days.

## 2025-07-21 ENCOUNTER — TELEPHONE (OUTPATIENT)
Age: 65
End: 2025-07-21

## 2025-07-21 DIAGNOSIS — F51.01 PRIMARY INSOMNIA: ICD-10-CM

## 2025-07-21 RX ORDER — ALPRAZOLAM 0.5 MG
TABLET ORAL
Qty: 30 TABLET | Refills: 1 | Status: SHIPPED | OUTPATIENT
Start: 2025-07-21

## 2025-07-21 NOTE — TELEPHONE ENCOUNTER
Qnasl was denied and   Ref# is [id number]  You Will receive a letter and follow the instructions on the letter.